# Patient Record
Sex: FEMALE | Race: WHITE | Employment: OTHER | URBAN - METROPOLITAN AREA
[De-identification: names, ages, dates, MRNs, and addresses within clinical notes are randomized per-mention and may not be internally consistent; named-entity substitution may affect disease eponyms.]

---

## 2017-01-06 ENCOUNTER — OFFICE VISIT (OUTPATIENT)
Dept: INTERNAL MEDICINE CLINIC | Age: 62
End: 2017-01-06

## 2017-01-06 VITALS
WEIGHT: 115 LBS | DIASTOLIC BLOOD PRESSURE: 60 MMHG | HEART RATE: 100 BPM | HEIGHT: 64 IN | SYSTOLIC BLOOD PRESSURE: 92 MMHG | BODY MASS INDEX: 19.63 KG/M2

## 2017-01-06 DIAGNOSIS — J45.40 MODERATE PERSISTENT ASTHMA WITHOUT COMPLICATION: ICD-10-CM

## 2017-01-06 DIAGNOSIS — T45.1X5A ANTINEOPLASTIC CHEMOTHERAPY INDUCED PANCYTOPENIA (HCC): ICD-10-CM

## 2017-01-06 DIAGNOSIS — T45.1X5A CHEMOTHERAPY-INDUCED NEUROPATHY (HCC): ICD-10-CM

## 2017-01-06 DIAGNOSIS — D61.810 ANTINEOPLASTIC CHEMOTHERAPY INDUCED PANCYTOPENIA (HCC): ICD-10-CM

## 2017-01-06 DIAGNOSIS — G62.0 CHEMOTHERAPY-INDUCED NEUROPATHY (HCC): ICD-10-CM

## 2017-01-06 DIAGNOSIS — Z23 NEED FOR PNEUMOCOCCAL VACCINATION: ICD-10-CM

## 2017-01-06 DIAGNOSIS — Z93.3 COLOSTOMY STATUS (HCC): ICD-10-CM

## 2017-01-06 DIAGNOSIS — K62.89 PROCTODYNIA: ICD-10-CM

## 2017-01-06 DIAGNOSIS — C18.7 MALIGNANT NEOPLASM OF SIGMOID COLON (HCC): Primary | ICD-10-CM

## 2017-01-06 DIAGNOSIS — E46 PROTEIN CALORIE MALNUTRITION (HCC): ICD-10-CM

## 2017-01-06 DIAGNOSIS — F43.21 ADJUSTMENT REACTION WITH PROLONGED DEPRESSIVE REACTION: ICD-10-CM

## 2017-01-06 DIAGNOSIS — Z93.6 NEPHROSTOMY STATUS (HCC): ICD-10-CM

## 2017-01-06 PROCEDURE — G0009 ADMIN PNEUMOCOCCAL VACCINE: HCPCS | Performed by: FAMILY MEDICINE

## 2017-01-06 PROCEDURE — 90670 PCV13 VACCINE IM: CPT | Performed by: FAMILY MEDICINE

## 2017-01-06 PROCEDURE — 99214 OFFICE O/P EST MOD 30 MIN: CPT | Performed by: FAMILY MEDICINE

## 2017-01-06 RX ORDER — EMOLLIENT BASE
CREAM (GRAM) TOPICAL
Qty: 240 G | Refills: 3 | Status: SHIPPED | OUTPATIENT
Start: 2017-01-06 | End: 2017-03-15

## 2017-01-06 RX ORDER — NORTRIPTYLINE HYDROCHLORIDE 75 MG/1
75 CAPSULE ORAL NIGHTLY
Qty: 30 CAPSULE | Refills: 3 | Status: SHIPPED | OUTPATIENT
Start: 2017-01-06 | End: 2017-05-03 | Stop reason: SDUPTHER

## 2017-01-08 ASSESSMENT — ENCOUNTER SYMPTOMS
CHEST TIGHTNESS: 0
CONSTIPATION: 0
NAUSEA: 1
WHEEZING: 0
DIARRHEA: 0
VOMITING: 0
SHORTNESS OF BREATH: 0
COUGH: 0

## 2017-01-10 PROBLEM — D64.9 ANEMIA: Status: ACTIVE | Noted: 2017-01-10

## 2017-01-11 PROBLEM — D50.0 IRON DEFICIENCY ANEMIA DUE TO CHRONIC BLOOD LOSS: Status: ACTIVE | Noted: 2017-01-11

## 2017-01-11 PROBLEM — Z87.19 PERSONAL HISTORY OF DIGESTIVE DISEASE: Status: ACTIVE | Noted: 2017-01-11

## 2017-01-31 PROBLEM — T45.1X5A CHEMOTHERAPY-INDUCED THROMBOCYTOPENIA: Status: ACTIVE | Noted: 2017-01-31

## 2017-01-31 PROBLEM — D69.59 CHEMOTHERAPY-INDUCED THROMBOCYTOPENIA: Status: ACTIVE | Noted: 2017-01-31

## 2017-02-01 ENCOUNTER — HOSPITAL ENCOUNTER (OUTPATIENT)
Dept: INTERVENTIONAL RADIOLOGY/VASCULAR | Age: 62
Discharge: HOME | End: 2017-02-01
Attending: INTERNAL MEDICINE | Admitting: INTERNAL MEDICINE

## 2017-02-01 VITALS
RESPIRATION RATE: 14 BRPM | BODY MASS INDEX: 19.54 KG/M2 | HEART RATE: 72 BPM | HEIGHT: 65 IN | TEMPERATURE: 98 F | OXYGEN SATURATION: 100 % | DIASTOLIC BLOOD PRESSURE: 62 MMHG | SYSTOLIC BLOOD PRESSURE: 104 MMHG | WEIGHT: 117.28 LBS

## 2017-02-01 DIAGNOSIS — C78.7 METASTASIS TO LIVER (HCC): Chronic | ICD-10-CM

## 2017-02-01 DIAGNOSIS — C18.7 MALIGNANT NEOPLASM OF SIGMOID COLON (HCC): ICD-10-CM

## 2017-02-01 LAB
INR BLD: 1.41 (ref 0.85–1.16)
PROTHROMBIN TIME: 16.2 SEC (ref 9.8–13)

## 2017-02-01 RX ORDER — IODIXANOL 320 MG/ML
100 INJECTION, SOLUTION INTRAVASCULAR
Status: COMPLETED | OUTPATIENT
Start: 2017-02-01 | End: 2017-02-01

## 2017-02-01 RX ORDER — MIDAZOLAM HYDROCHLORIDE 1 MG/ML
INJECTION INTRAMUSCULAR; INTRAVENOUS DAILY PRN
Status: COMPLETED | OUTPATIENT
Start: 2017-02-01 | End: 2017-02-01

## 2017-02-01 RX ORDER — SODIUM CHLORIDE 9 MG/ML
INJECTION, SOLUTION INTRAVENOUS CONTINUOUS
Status: DISCONTINUED | OUTPATIENT
Start: 2017-02-01 | End: 2017-02-02 | Stop reason: HOSPADM

## 2017-02-01 RX ORDER — CIPROFLOXACIN 2 MG/ML
400 INJECTION, SOLUTION INTRAVENOUS
Status: DISCONTINUED | OUTPATIENT
Start: 2017-02-01 | End: 2017-02-01 | Stop reason: SDUPTHER

## 2017-02-01 RX ORDER — FENTANYL CITRATE 50 UG/ML
INJECTION, SOLUTION INTRAMUSCULAR; INTRAVENOUS DAILY PRN
Status: COMPLETED | OUTPATIENT
Start: 2017-02-01 | End: 2017-02-01

## 2017-02-01 RX ORDER — CLINDAMYCIN PHOSPHATE 900 MG/50ML
900 INJECTION INTRAVENOUS
Status: DISCONTINUED | OUTPATIENT
Start: 2017-02-01 | End: 2017-02-01 | Stop reason: SDUPTHER

## 2017-02-01 RX ORDER — CIPROFLOXACIN 2 MG/ML
400 INJECTION, SOLUTION INTRAVENOUS
Status: COMPLETED | OUTPATIENT
Start: 2017-02-01 | End: 2017-02-01

## 2017-02-01 RX ORDER — CLINDAMYCIN PHOSPHATE 900 MG/50ML
900 INJECTION INTRAVENOUS
Status: COMPLETED | OUTPATIENT
Start: 2017-02-01 | End: 2017-02-01

## 2017-02-01 RX ADMIN — FENTANYL CITRATE 50 MCG: 50 INJECTION, SOLUTION INTRAMUSCULAR; INTRAVENOUS at 08:32

## 2017-02-01 RX ADMIN — MIDAZOLAM HYDROCHLORIDE 1 MG: 1 INJECTION INTRAMUSCULAR; INTRAVENOUS at 08:26

## 2017-02-01 RX ADMIN — CIPROFLOXACIN 400 MG: 2 INJECTION, SOLUTION INTRAVENOUS at 08:21

## 2017-02-01 RX ADMIN — CLINDAMYCIN PHOSPHATE 900 MG: 900 INJECTION INTRAVENOUS at 08:07

## 2017-02-01 RX ADMIN — MIDAZOLAM HYDROCHLORIDE 1 MG: 1 INJECTION INTRAMUSCULAR; INTRAVENOUS at 08:28

## 2017-02-01 RX ADMIN — FENTANYL CITRATE 50 MCG: 50 INJECTION, SOLUTION INTRAMUSCULAR; INTRAVENOUS at 08:26

## 2017-02-01 RX ADMIN — IODIXANOL 20 ML: 320 INJECTION, SOLUTION INTRAVASCULAR at 08:56

## 2017-02-01 RX ADMIN — SODIUM CHLORIDE: 9 INJECTION, SOLUTION INTRAVENOUS at 07:45

## 2017-02-01 ASSESSMENT — PAIN SCALES - GENERAL
PAINLEVEL_OUTOF10: 0
PAINLEVEL_OUTOF10: 2
PAINLEVEL_OUTOF10: 0

## 2017-02-01 ASSESSMENT — PAIN DESCRIPTION - DESCRIPTORS: DESCRIPTORS: ACHING;BURNING

## 2017-02-01 ASSESSMENT — PAIN - FUNCTIONAL ASSESSMENT: PAIN_FUNCTIONAL_ASSESSMENT: 0-10

## 2017-02-17 ENCOUNTER — TELEPHONE (OUTPATIENT)
Dept: INTERNAL MEDICINE CLINIC | Age: 62
End: 2017-02-17

## 2017-02-28 PROBLEM — I73.9 RIGHT LEG CLAUDICATION (HCC): Status: ACTIVE | Noted: 2017-02-28

## 2017-03-03 ENCOUNTER — HOSPITAL ENCOUNTER (OUTPATIENT)
Dept: VASCULAR LAB | Age: 62
Discharge: HOME | End: 2017-03-03
Attending: INTERNAL MEDICINE | Admitting: INTERNAL MEDICINE

## 2017-03-03 DIAGNOSIS — C18.7 MALIGNANT NEOPLASM OF SIGMOID COLON (HCC): ICD-10-CM

## 2017-03-06 PROBLEM — I77.1 RIGHT ILIAC ARTERY STENOSIS (HCC): Status: ACTIVE | Noted: 2017-03-06

## 2017-03-07 ENCOUNTER — INITIAL CONSULT (OUTPATIENT)
Dept: SURGERY | Age: 62
End: 2017-03-07

## 2017-03-07 VITALS
HEART RATE: 117 BPM | WEIGHT: 121 LBS | BODY MASS INDEX: 20.16 KG/M2 | SYSTOLIC BLOOD PRESSURE: 114 MMHG | DIASTOLIC BLOOD PRESSURE: 66 MMHG | HEIGHT: 65 IN

## 2017-03-07 DIAGNOSIS — I70.261 ATHEROSCLEROSIS OF NATIVE ARTERY OF RIGHT LOWER EXTREMITY WITH GANGRENE (HCC): Primary | ICD-10-CM

## 2017-03-07 DIAGNOSIS — I10 ESSENTIAL HYPERTENSION: ICD-10-CM

## 2017-03-07 DIAGNOSIS — I73.9 PVD (PERIPHERAL VASCULAR DISEASE) (HCC): ICD-10-CM

## 2017-03-07 DIAGNOSIS — I70.234 ATHEROSCLEROSIS OF NATIVE ARTERY OF RIGHT LOWER EXTREMITY WITH ULCERATION OF MIDFOOT (HCC): ICD-10-CM

## 2017-03-07 DIAGNOSIS — I77.1 RIGHT ILIAC ARTERY STENOSIS (HCC): ICD-10-CM

## 2017-03-07 PROCEDURE — 99205 OFFICE O/P NEW HI 60 MIN: CPT | Performed by: SURGERY

## 2017-03-07 PROCEDURE — 3014F SCREEN MAMMO DOC REV: CPT | Performed by: SURGERY

## 2017-03-07 PROCEDURE — G8420 CALC BMI NORM PARAMETERS: HCPCS | Performed by: SURGERY

## 2017-03-07 PROCEDURE — G8484 FLU IMMUNIZE NO ADMIN: HCPCS | Performed by: SURGERY

## 2017-03-07 PROCEDURE — 4004F PT TOBACCO SCREEN RCVD TLK: CPT | Performed by: SURGERY

## 2017-03-07 PROCEDURE — G8599 NO ASA/ANTIPLAT THER USE RNG: HCPCS | Performed by: SURGERY

## 2017-03-07 PROCEDURE — G8427 DOCREV CUR MEDS BY ELIG CLIN: HCPCS | Performed by: SURGERY

## 2017-03-07 PROCEDURE — 3017F COLORECTAL CA SCREEN DOC REV: CPT | Performed by: SURGERY

## 2017-03-07 ASSESSMENT — ENCOUNTER SYMPTOMS
RESPIRATORY NEGATIVE: 1
COLOR CHANGE: 1
GASTROINTESTINAL NEGATIVE: 1

## 2017-03-15 ENCOUNTER — HOSPITAL ENCOUNTER (OUTPATIENT)
Dept: CARDIAC CATH/INVASIVE PROCEDURES | Age: 62
Discharge: HOME | End: 2017-03-15
Attending: INTERNAL MEDICINE | Admitting: INTERNAL MEDICINE

## 2017-03-15 VITALS — HEIGHT: 65 IN | WEIGHT: 121 LBS | BODY MASS INDEX: 20.16 KG/M2

## 2017-03-15 DIAGNOSIS — I70.234 ATHEROSCLEROSIS OF NATIVE ARTERY OF RIGHT LOWER EXTREMITY WITH ULCERATION OF MIDFOOT (HCC): Primary | ICD-10-CM

## 2017-03-15 DIAGNOSIS — C18.7 MALIGNANT NEOPLASM OF SIGMOID COLON (HCC): ICD-10-CM

## 2017-03-15 LAB
CALCIUM IONIZED: 1.08 MMOL/L (ref 1.12–1.32)
GFR AFRICAN AMERICAN: 56
GFR NON-AFRICAN AMERICAN: 46
GLUCOSE BLD-MCNC: 108 MG/DL (ref 70–99)
HCT VFR BLD CALC: 25.3 % (ref 36–48)
HEMOGLOBIN: 8.3 G/DL (ref 12–16)
HEMOGLOBIN: 8.3 GM/DL (ref 12–16)
MCH RBC QN AUTO: 34.4 PG (ref 26–34)
MCHC RBC AUTO-ENTMCNC: 32.8 G/DL (ref 31–36)
MCV RBC AUTO: 104.8 FL (ref 80–100)
PDW BLD-RTO: 19.6 % (ref 12.4–15.4)
PERFORMED ON: ABNORMAL
PLATELET # BLD: 133 K/UL (ref 135–450)
PMV BLD AUTO: 7.1 FL (ref 5–10.5)
POC CHLORIDE: 107 MMOL/L (ref 99–110)
POC CREATININE: 1.2 MG/DL (ref 0.6–1.1)
POC HEMATOCRIT: 24 % (ref 36–48)
POC POTASSIUM: 4.1 MMOL/L (ref 3.5–5.1)
POC SAMPLE TYPE: ABNORMAL
POC SODIUM: 138 MMOL/L (ref 136–145)
RBC # BLD: 2.42 M/UL (ref 4–5.2)
WBC # BLD: 3.7 K/UL (ref 4–11)

## 2017-03-15 PROCEDURE — 75716 ARTERY X-RAYS ARMS/LEGS: CPT | Performed by: SURGERY

## 2017-03-15 PROCEDURE — 37221 PR REVSC OPN/PRQ ILIAC ART W/STNT PLMT & ANGIOPLSTY: CPT | Performed by: SURGERY

## 2017-03-15 PROCEDURE — 76937 US GUIDE VASCULAR ACCESS: CPT | Performed by: SURGERY

## 2017-03-15 PROCEDURE — 37223 PR REVSC OPN/PRQ ILIAC ART W/STNT & ANGIOP IPSILATL: CPT | Performed by: SURGERY

## 2017-03-15 PROCEDURE — 75625 CONTRAST EXAM ABDOMINL AORTA: CPT | Performed by: SURGERY

## 2017-03-15 RX ORDER — ACETAMINOPHEN 325 MG/1
650 TABLET ORAL EVERY 4 HOURS PRN
Status: DISCONTINUED | OUTPATIENT
Start: 2017-03-15 | End: 2017-03-16 | Stop reason: HOSPADM

## 2017-03-15 RX ORDER — ASPIRIN 81 MG/1
81 TABLET, CHEWABLE ORAL DAILY
Status: DISCONTINUED | OUTPATIENT
Start: 2017-03-16 | End: 2017-03-16 | Stop reason: HOSPADM

## 2017-03-15 RX ORDER — SODIUM CHLORIDE 0.9 % (FLUSH) 0.9 %
10 SYRINGE (ML) INJECTION PRN
Status: DISCONTINUED | OUTPATIENT
Start: 2017-03-15 | End: 2017-03-16 | Stop reason: HOSPADM

## 2017-03-15 RX ORDER — ONDANSETRON 2 MG/ML
4 INJECTION INTRAMUSCULAR; INTRAVENOUS EVERY 6 HOURS PRN
Status: DISCONTINUED | OUTPATIENT
Start: 2017-03-15 | End: 2017-03-16 | Stop reason: HOSPADM

## 2017-03-15 RX ORDER — SODIUM CHLORIDE 9 MG/ML
INJECTION, SOLUTION INTRAVENOUS CONTINUOUS
Status: DISCONTINUED | OUTPATIENT
Start: 2017-03-15 | End: 2017-03-16 | Stop reason: HOSPADM

## 2017-03-15 RX ORDER — FENTANYL CITRATE 50 UG/ML
25 INJECTION, SOLUTION INTRAMUSCULAR; INTRAVENOUS
Status: ACTIVE | OUTPATIENT
Start: 2017-03-15 | End: 2017-03-15

## 2017-03-15 RX ORDER — 0.9 % SODIUM CHLORIDE 0.9 %
500 INTRAVENOUS SOLUTION INTRAVENOUS PRN
Status: DISCONTINUED | OUTPATIENT
Start: 2017-03-15 | End: 2017-03-16 | Stop reason: HOSPADM

## 2017-03-15 RX ORDER — MORPHINE SULFATE 10 MG/ML
2 INJECTION, SOLUTION INTRAMUSCULAR; INTRAVENOUS
Status: ACTIVE | OUTPATIENT
Start: 2017-03-15 | End: 2017-03-15

## 2017-03-15 RX ORDER — SODIUM CHLORIDE 0.9 % (FLUSH) 0.9 %
10 SYRINGE (ML) INJECTION EVERY 12 HOURS SCHEDULED
Status: DISCONTINUED | OUTPATIENT
Start: 2017-03-15 | End: 2017-03-16 | Stop reason: HOSPADM

## 2017-03-17 ENCOUNTER — HOSPITAL ENCOUNTER (OUTPATIENT)
Dept: CT IMAGING | Age: 62
Discharge: HOME | End: 2017-03-17
Attending: INTERNAL MEDICINE | Admitting: INTERNAL MEDICINE

## 2017-03-17 DIAGNOSIS — C78.00 METASTATIC ADENOCARCINOMA TO LUNG, UNSPECIFIED LATERALITY (HCC): ICD-10-CM

## 2017-03-17 DIAGNOSIS — C78.7 METASTASIS TO LIVER (HCC): ICD-10-CM

## 2017-03-17 DIAGNOSIS — C18.7 MALIGNANT NEOPLASM OF SIGMOID COLON (HCC): ICD-10-CM

## 2017-03-22 ENCOUNTER — HOSPITAL ENCOUNTER (OUTPATIENT)
Dept: WOUND CARE | Age: 62
Discharge: HOME | End: 2017-03-22
Attending: PODIATRIST | Admitting: PODIATRIST

## 2017-03-22 VITALS
SYSTOLIC BLOOD PRESSURE: 132 MMHG | BODY MASS INDEX: 19.39 KG/M2 | DIASTOLIC BLOOD PRESSURE: 79 MMHG | WEIGHT: 116.4 LBS | HEART RATE: 106 BPM | RESPIRATION RATE: 20 BRPM | TEMPERATURE: 98.3 F

## 2017-03-22 ASSESSMENT — PAIN SCALES - GENERAL: PAINLEVEL_OUTOF10: 0

## 2017-03-24 ENCOUNTER — HOSPITAL ENCOUNTER (OUTPATIENT)
Dept: INTERVENTIONAL RADIOLOGY/VASCULAR | Age: 62
Discharge: HOME | End: 2017-03-24
Attending: INTERNAL MEDICINE | Admitting: INTERNAL MEDICINE

## 2017-03-24 VITALS
DIASTOLIC BLOOD PRESSURE: 63 MMHG | TEMPERATURE: 97.7 F | WEIGHT: 113.32 LBS | SYSTOLIC BLOOD PRESSURE: 122 MMHG | RESPIRATION RATE: 14 BRPM | HEART RATE: 93 BPM | BODY MASS INDEX: 19.35 KG/M2 | OXYGEN SATURATION: 98 % | HEIGHT: 64 IN

## 2017-03-24 DIAGNOSIS — C18.7 MALIGNANT NEOPLASM OF SIGMOID COLON (HCC): ICD-10-CM

## 2017-03-24 DIAGNOSIS — Z93.3 COLOSTOMY STATUS (HCC): ICD-10-CM

## 2017-03-24 LAB
INR BLD: 1.24 (ref 0.85–1.15)
PROTHROMBIN TIME: 14 SEC (ref 9.6–13)

## 2017-03-24 RX ORDER — CLINDAMYCIN PHOSPHATE 600 MG/50ML
600 INJECTION INTRAVENOUS
Status: DISCONTINUED | OUTPATIENT
Start: 2017-03-24 | End: 2017-03-24 | Stop reason: DRUGHIGH

## 2017-03-24 RX ORDER — IODIXANOL 320 MG/ML
100 INJECTION, SOLUTION INTRAVASCULAR
Status: COMPLETED | OUTPATIENT
Start: 2017-03-24 | End: 2017-03-24

## 2017-03-24 RX ORDER — SODIUM CHLORIDE 9 MG/ML
INJECTION, SOLUTION INTRAVENOUS CONTINUOUS
Status: DISCONTINUED | OUTPATIENT
Start: 2017-03-24 | End: 2017-03-25 | Stop reason: HOSPADM

## 2017-03-24 RX ORDER — CLINDAMYCIN PHOSPHATE 900 MG/50ML
900 INJECTION INTRAVENOUS
Status: COMPLETED | OUTPATIENT
Start: 2017-03-24 | End: 2017-03-24

## 2017-03-24 RX ORDER — CIPROFLOXACIN 2 MG/ML
400 INJECTION, SOLUTION INTRAVENOUS
Status: COMPLETED | OUTPATIENT
Start: 2017-03-24 | End: 2017-03-24

## 2017-03-24 RX ORDER — MIDAZOLAM HYDROCHLORIDE 1 MG/ML
INJECTION INTRAMUSCULAR; INTRAVENOUS DAILY PRN
Status: COMPLETED | OUTPATIENT
Start: 2017-03-24 | End: 2017-03-24

## 2017-03-24 RX ADMIN — CIPROFLOXACIN 400 MG: 2 INJECTION, SOLUTION INTRAVENOUS at 12:42

## 2017-03-24 RX ADMIN — MIDAZOLAM HYDROCHLORIDE 2 MG: 1 INJECTION INTRAMUSCULAR; INTRAVENOUS at 12:46

## 2017-03-24 RX ADMIN — IODIXANOL 10 ML: 320 INJECTION, SOLUTION INTRAVASCULAR at 13:17

## 2017-03-24 RX ADMIN — SODIUM CHLORIDE: 9 INJECTION, SOLUTION INTRAVENOUS at 12:21

## 2017-03-24 RX ADMIN — CLINDAMYCIN PHOSPHATE 900 MG: 900 INJECTION INTRAVENOUS at 12:21

## 2017-03-24 ASSESSMENT — PAIN - FUNCTIONAL ASSESSMENT: PAIN_FUNCTIONAL_ASSESSMENT: 0-10

## 2017-03-29 ENCOUNTER — PROCEDURE VISIT (OUTPATIENT)
Dept: SURGERY | Age: 62
End: 2017-03-29

## 2017-03-29 ENCOUNTER — TELEPHONE (OUTPATIENT)
Dept: SURGERY | Age: 62
End: 2017-03-29

## 2017-03-29 DIAGNOSIS — I70.234 ATHEROSCLEROSIS OF NATIVE ARTERY OF RIGHT LOWER EXTREMITY WITH ULCERATION OF MIDFOOT (HCC): ICD-10-CM

## 2017-03-29 DIAGNOSIS — I77.1 RIGHT ILIAC ARTERY STENOSIS (HCC): Primary | ICD-10-CM

## 2017-03-29 DIAGNOSIS — I73.9 RIGHT LEG CLAUDICATION (HCC): ICD-10-CM

## 2017-03-29 PROCEDURE — 93926 LOWER EXTREMITY STUDY: CPT | Performed by: SURGERY

## 2017-03-30 PROBLEM — I74.3 ARTERIAL EMBOLISM AND THROMBOSIS OF LOWER EXTREMITY (HCC): Status: ACTIVE | Noted: 2017-03-30

## 2017-03-31 ENCOUNTER — HOSPITAL ENCOUNTER (OUTPATIENT)
Dept: CARDIAC CATH/INVASIVE PROCEDURES | Age: 62
Discharge: HOME | End: 2017-03-31
Attending: SURGERY | Admitting: SURGERY

## 2017-03-31 VITALS — HEIGHT: 64 IN | WEIGHT: 113 LBS | BODY MASS INDEX: 19.29 KG/M2

## 2017-03-31 DIAGNOSIS — I74.3 ARTERIAL EMBOLISM AND THROMBOSIS OF LOWER EXTREMITY (HCC): ICD-10-CM

## 2017-03-31 DIAGNOSIS — I70.234 ATHEROSCLEROSIS OF NATIVE ARTERY OF RIGHT LOWER EXTREMITY WITH ULCERATION OF MIDFOOT (HCC): Primary | ICD-10-CM

## 2017-03-31 DIAGNOSIS — C18.7 MALIGNANT NEOPLASM OF SIGMOID COLON (HCC): ICD-10-CM

## 2017-03-31 LAB
ANION GAP SERPL CALCULATED.3IONS-SCNC: 10 MMOL/L (ref 3–16)
BUN BLDV-MCNC: 26 MG/DL (ref 7–20)
CALCIUM SERPL-MCNC: 8.7 MG/DL (ref 8.3–10.6)
CHLORIDE BLD-SCNC: 103 MMOL/L (ref 99–110)
CO2: 20 MMOL/L (ref 21–32)
CREAT SERPL-MCNC: 1.2 MG/DL (ref 0.6–1.1)
GFR AFRICAN AMERICAN: 56
GFR NON-AFRICAN AMERICAN: 46
GLUCOSE BLD-MCNC: 108 MG/DL (ref 70–99)
HCT VFR BLD CALC: 22.2 % (ref 36–48)
HEMOGLOBIN: 7.2 G/DL (ref 12–16)
MCH RBC QN AUTO: 33.4 PG (ref 26–34)
MCHC RBC AUTO-ENTMCNC: 32.5 G/DL (ref 31–36)
MCV RBC AUTO: 102.9 FL (ref 80–100)
PDW BLD-RTO: 18.9 % (ref 12.4–15.4)
PLATELET # BLD: 148 K/UL (ref 135–450)
PMV BLD AUTO: 6.8 FL (ref 5–10.5)
POTASSIUM SERPL-SCNC: 4.3 MMOL/L (ref 3.5–5.1)
RBC # BLD: 2.16 M/UL (ref 4–5.2)
SODIUM BLD-SCNC: 133 MMOL/L (ref 136–145)
WBC # BLD: 3.2 K/UL (ref 4–11)

## 2017-03-31 PROCEDURE — 37226 PR REVSC OPN/PRQ FEM/POP W/STNT/ANGIOP SM VSL: CPT | Performed by: SURGERY

## 2017-03-31 PROCEDURE — 76937 US GUIDE VASCULAR ACCESS: CPT | Performed by: SURGERY

## 2017-03-31 PROCEDURE — 37221 PR REVSC OPN/PRQ ILIAC ART W/STNT PLMT & ANGIOPLSTY: CPT | Performed by: SURGERY

## 2017-03-31 PROCEDURE — 37185 PRIM ART M-THRMBC SBSQ VSL: CPT | Performed by: SURGERY

## 2017-03-31 PROCEDURE — 75625 CONTRAST EXAM ABDOMINL AORTA: CPT | Performed by: SURGERY

## 2017-03-31 PROCEDURE — 37184 PRIM ART M-THRMBC 1ST VSL: CPT | Performed by: SURGERY

## 2017-03-31 PROCEDURE — 75710 ARTERY X-RAYS ARM/LEG: CPT | Performed by: SURGERY

## 2017-03-31 RX ORDER — ASPIRIN 81 MG/1
81 TABLET, CHEWABLE ORAL DAILY
Status: DISCONTINUED | OUTPATIENT
Start: 2017-03-31 | End: 2017-04-01 | Stop reason: HOSPADM

## 2017-03-31 RX ORDER — ASPIRIN 81 MG/1
81 TABLET ORAL DAILY
Qty: 30 TABLET | Refills: 3 | Status: SHIPPED | OUTPATIENT
Start: 2017-03-31

## 2017-03-31 RX ORDER — SODIUM CHLORIDE 9 MG/ML
INJECTION, SOLUTION INTRAVENOUS CONTINUOUS
Status: DISCONTINUED | OUTPATIENT
Start: 2017-03-31 | End: 2017-04-01 | Stop reason: HOSPADM

## 2017-03-31 RX ORDER — SODIUM CHLORIDE 0.9 % (FLUSH) 0.9 %
10 SYRINGE (ML) INJECTION EVERY 12 HOURS SCHEDULED
Status: DISCONTINUED | OUTPATIENT
Start: 2017-03-31 | End: 2017-03-31 | Stop reason: ALTCHOICE

## 2017-03-31 RX ORDER — SODIUM CHLORIDE 9 MG/ML
INJECTION, SOLUTION INTRAVENOUS CONTINUOUS
Status: DISCONTINUED | OUTPATIENT
Start: 2017-03-31 | End: 2017-03-31 | Stop reason: ALTCHOICE

## 2017-03-31 RX ORDER — ACETAMINOPHEN 325 MG/1
650 TABLET ORAL EVERY 4 HOURS PRN
Status: DISCONTINUED | OUTPATIENT
Start: 2017-03-31 | End: 2017-04-01 | Stop reason: HOSPADM

## 2017-03-31 RX ORDER — SODIUM CHLORIDE 0.9 % (FLUSH) 0.9 %
10 SYRINGE (ML) INJECTION PRN
Status: DISCONTINUED | OUTPATIENT
Start: 2017-03-31 | End: 2017-03-31 | Stop reason: ALTCHOICE

## 2017-03-31 RX ORDER — MORPHINE SULFATE 10 MG/ML
2 INJECTION, SOLUTION INTRAMUSCULAR; INTRAVENOUS
Status: ACTIVE | OUTPATIENT
Start: 2017-03-31 | End: 2017-03-31

## 2017-03-31 RX ORDER — FENTANYL CITRATE 50 UG/ML
25 INJECTION, SOLUTION INTRAMUSCULAR; INTRAVENOUS
Status: ACTIVE | OUTPATIENT
Start: 2017-03-31 | End: 2017-03-31

## 2017-03-31 RX ORDER — ONDANSETRON 2 MG/ML
4 INJECTION INTRAMUSCULAR; INTRAVENOUS EVERY 6 HOURS PRN
Status: DISCONTINUED | OUTPATIENT
Start: 2017-03-31 | End: 2017-04-01 | Stop reason: HOSPADM

## 2017-03-31 RX ORDER — 0.9 % SODIUM CHLORIDE 0.9 %
500 INTRAVENOUS SOLUTION INTRAVENOUS PRN
Status: DISCONTINUED | OUTPATIENT
Start: 2017-03-31 | End: 2017-04-01 | Stop reason: HOSPADM

## 2017-04-03 DIAGNOSIS — F32.9 REACTIVE DEPRESSION (SITUATIONAL): ICD-10-CM

## 2017-04-03 DIAGNOSIS — R63.4 ABNORMAL WEIGHT LOSS: ICD-10-CM

## 2017-04-03 RX ORDER — MIRTAZAPINE 15 MG/1
TABLET, FILM COATED ORAL
Qty: 30 TABLET | Refills: 5 | Status: SHIPPED | OUTPATIENT
Start: 2017-04-03 | End: 2017-09-18 | Stop reason: SDUPTHER

## 2017-04-04 DIAGNOSIS — I10 ESSENTIAL HYPERTENSION: ICD-10-CM

## 2017-04-05 RX ORDER — LISINOPRIL 5 MG/1
TABLET ORAL
Qty: 30 TABLET | Refills: 5 | Status: SHIPPED | OUTPATIENT
Start: 2017-04-05 | End: 2017-09-18 | Stop reason: SDUPTHER

## 2017-04-06 ENCOUNTER — SURG/PROC ORDERS (OUTPATIENT)
Dept: ANESTHESIOLOGY | Age: 62
End: 2017-04-06

## 2017-04-06 ENCOUNTER — OFFICE VISIT (OUTPATIENT)
Dept: INTERNAL MEDICINE CLINIC | Age: 62
End: 2017-04-06

## 2017-04-06 ENCOUNTER — PAT TELEPHONE (OUTPATIENT)
Dept: PREADMISSION TESTING | Age: 62
End: 2017-04-06

## 2017-04-06 VITALS — WEIGHT: 113 LBS | HEIGHT: 65 IN | BODY MASS INDEX: 18.83 KG/M2

## 2017-04-06 VITALS
SYSTOLIC BLOOD PRESSURE: 110 MMHG | HEART RATE: 88 BPM | BODY MASS INDEX: 20.3 KG/M2 | WEIGHT: 122 LBS | DIASTOLIC BLOOD PRESSURE: 64 MMHG

## 2017-04-06 DIAGNOSIS — I96 DRY GANGRENE (HCC): ICD-10-CM

## 2017-04-06 DIAGNOSIS — F17.200 TOBACCO DEPENDENCE: ICD-10-CM

## 2017-04-06 DIAGNOSIS — Z01.818 PRE-OP EXAMINATION: Primary | ICD-10-CM

## 2017-04-06 DIAGNOSIS — R30.0 DYSURIA: ICD-10-CM

## 2017-04-06 DIAGNOSIS — I70.234 ATHEROSCLEROSIS OF NATIVE ARTERY OF RIGHT LOWER EXTREMITY WITH ULCERATION OF MIDFOOT (HCC): ICD-10-CM

## 2017-04-06 DIAGNOSIS — C18.7 MALIGNANT NEOPLASM OF SIGMOID COLON (HCC): ICD-10-CM

## 2017-04-06 PROBLEM — Z87.19 PERSONAL HISTORY OF DIGESTIVE DISEASE: Status: RESOLVED | Noted: 2017-01-11 | Resolved: 2017-04-06

## 2017-04-06 LAB
BILIRUBIN, POC: ABNORMAL
BLOOD URINE, POC: ABNORMAL
CLARITY, POC: ABNORMAL
COLOR, POC: ABNORMAL
GLUCOSE URINE, POC: ABNORMAL
KETONES, POC: ABNORMAL
LEUKOCYTE EST, POC: ABNORMAL
NITRITE, POC: NEGATIVE
PH, POC: 6
PROTEIN, POC: ABNORMAL
SPECIFIC GRAVITY, POC: 1.03
UROBILINOGEN, POC: 0.2

## 2017-04-06 PROCEDURE — 99214 OFFICE O/P EST MOD 30 MIN: CPT | Performed by: FAMILY MEDICINE

## 2017-04-06 PROCEDURE — G8598 ASA/ANTIPLAT THER USED: HCPCS | Performed by: FAMILY MEDICINE

## 2017-04-06 PROCEDURE — 3014F SCREEN MAMMO DOC REV: CPT | Performed by: FAMILY MEDICINE

## 2017-04-06 PROCEDURE — G8420 CALC BMI NORM PARAMETERS: HCPCS | Performed by: FAMILY MEDICINE

## 2017-04-06 PROCEDURE — 93000 ELECTROCARDIOGRAM COMPLETE: CPT | Performed by: FAMILY MEDICINE

## 2017-04-06 PROCEDURE — G8427 DOCREV CUR MEDS BY ELIG CLIN: HCPCS | Performed by: FAMILY MEDICINE

## 2017-04-06 PROCEDURE — 3017F COLORECTAL CA SCREEN DOC REV: CPT | Performed by: FAMILY MEDICINE

## 2017-04-06 PROCEDURE — 81002 URINALYSIS NONAUTO W/O SCOPE: CPT | Performed by: FAMILY MEDICINE

## 2017-04-06 PROCEDURE — 4004F PT TOBACCO SCREEN RCVD TLK: CPT | Performed by: FAMILY MEDICINE

## 2017-04-06 RX ORDER — SODIUM CHLORIDE 9 MG/ML
INJECTION, SOLUTION INTRAVENOUS CONTINUOUS
Status: CANCELLED | OUTPATIENT
Start: 2017-04-06

## 2017-04-06 RX ORDER — SODIUM CHLORIDE 0.9 % (FLUSH) 0.9 %
10 SYRINGE (ML) INJECTION EVERY 12 HOURS SCHEDULED
Status: CANCELLED | OUTPATIENT
Start: 2017-04-06

## 2017-04-06 RX ORDER — SODIUM CHLORIDE 0.9 % (FLUSH) 0.9 %
10 SYRINGE (ML) INJECTION PRN
Status: CANCELLED | OUTPATIENT
Start: 2017-04-06

## 2017-04-06 RX ORDER — CLOTRIMAZOLE 10 MG/1
10 LOZENGE ORAL; TOPICAL
Qty: 35 TABLET | Refills: 0 | Status: SHIPPED | OUTPATIENT
Start: 2017-04-08 | End: 2017-04-15

## 2017-04-06 RX ORDER — NORTRIPTYLINE HYDROCHLORIDE 25 MG/1
25 CAPSULE ORAL NIGHTLY
COMMUNITY
End: 2017-05-03 | Stop reason: SDUPTHER

## 2017-04-07 ENCOUNTER — HOSPITAL ENCOUNTER (OUTPATIENT)
Dept: SURGERY | Age: 62
Discharge: HOME | End: 2017-04-07
Attending: PODIATRIST | Admitting: PODIATRIST

## 2017-04-07 VITALS
BODY MASS INDEX: 20.17 KG/M2 | OXYGEN SATURATION: 100 % | HEART RATE: 81 BPM | HEIGHT: 65 IN | WEIGHT: 121.03 LBS | TEMPERATURE: 97.6 F | SYSTOLIC BLOOD PRESSURE: 110 MMHG | RESPIRATION RATE: 16 BRPM | DIASTOLIC BLOOD PRESSURE: 70 MMHG

## 2017-04-07 LAB
ANION GAP SERPL CALCULATED.3IONS-SCNC: 12 MMOL/L (ref 3–16)
BUN BLDV-MCNC: 34 MG/DL (ref 7–20)
CALCIUM SERPL-MCNC: 8.2 MG/DL (ref 8.3–10.6)
CHLORIDE BLD-SCNC: 104 MMOL/L (ref 99–110)
CO2: 19 MMOL/L (ref 21–32)
CREAT SERPL-MCNC: 1.6 MG/DL (ref 0.6–1.1)
GFR AFRICAN AMERICAN: 40
GFR NON-AFRICAN AMERICAN: 33
GLUCOSE BLD-MCNC: 88 MG/DL (ref 70–99)
HCT VFR BLD CALC: 19.8 % (ref 36–48)
HEMOGLOBIN: 6.4 G/DL (ref 12–16)
MCH RBC QN AUTO: 34.3 PG (ref 26–34)
MCHC RBC AUTO-ENTMCNC: 32.1 G/DL (ref 31–36)
MCV RBC AUTO: 106.9 FL (ref 80–100)
PDW BLD-RTO: 21 % (ref 12.4–15.4)
PLATELET # BLD: 132 K/UL (ref 135–450)
PMV BLD AUTO: 6.8 FL (ref 5–10.5)
POTASSIUM SERPL-SCNC: 4.7 MMOL/L (ref 3.5–5.1)
RBC # BLD: 1.86 M/UL (ref 4–5.2)
SODIUM BLD-SCNC: 135 MMOL/L (ref 136–145)
WBC # BLD: 2.3 K/UL (ref 4–11)

## 2017-04-07 RX ORDER — LABETALOL HYDROCHLORIDE 5 MG/ML
5 INJECTION, SOLUTION INTRAVENOUS EVERY 10 MIN PRN
Status: DISCONTINUED | OUTPATIENT
Start: 2017-04-07 | End: 2017-04-08 | Stop reason: HOSPADM

## 2017-04-07 RX ORDER — SODIUM CHLORIDE 9 MG/ML
INJECTION, SOLUTION INTRAVENOUS CONTINUOUS
Status: DISCONTINUED | OUTPATIENT
Start: 2017-04-07 | End: 2017-04-08 | Stop reason: HOSPADM

## 2017-04-07 RX ORDER — CLINDAMYCIN PHOSPHATE 600 MG/50ML
600 INJECTION INTRAVENOUS ONCE
Status: DISCONTINUED | OUTPATIENT
Start: 2017-04-07 | End: 2017-04-07 | Stop reason: DRUGHIGH

## 2017-04-07 RX ORDER — SODIUM CHLORIDE 0.9 % (FLUSH) 0.9 %
10 SYRINGE (ML) INJECTION EVERY 12 HOURS SCHEDULED
Status: DISCONTINUED | OUTPATIENT
Start: 2017-04-07 | End: 2017-04-08 | Stop reason: HOSPADM

## 2017-04-07 RX ORDER — CLINDAMYCIN PHOSPHATE 900 MG/50ML
900 INJECTION INTRAVENOUS ONCE
Status: COMPLETED | OUTPATIENT
Start: 2017-04-07 | End: 2017-04-07

## 2017-04-07 RX ORDER — FENTANYL CITRATE 50 UG/ML
25 INJECTION, SOLUTION INTRAMUSCULAR; INTRAVENOUS EVERY 5 MIN PRN
Status: DISCONTINUED | OUTPATIENT
Start: 2017-04-07 | End: 2017-04-08 | Stop reason: HOSPADM

## 2017-04-07 RX ORDER — SODIUM CHLORIDE 0.9 % (FLUSH) 0.9 %
10 SYRINGE (ML) INJECTION PRN
Status: DISCONTINUED | OUTPATIENT
Start: 2017-04-07 | End: 2017-04-08 | Stop reason: HOSPADM

## 2017-04-07 RX ORDER — IPRATROPIUM BROMIDE AND ALBUTEROL SULFATE 2.5; .5 MG/3ML; MG/3ML
1 SOLUTION RESPIRATORY (INHALATION) ONCE
Status: DISCONTINUED | OUTPATIENT
Start: 2017-04-07 | End: 2017-04-08 | Stop reason: HOSPADM

## 2017-04-07 RX ORDER — PROMETHAZINE HYDROCHLORIDE 25 MG/ML
6.25 INJECTION, SOLUTION INTRAMUSCULAR; INTRAVENOUS
Status: ACTIVE | OUTPATIENT
Start: 2017-04-07 | End: 2017-04-07

## 2017-04-07 RX ADMIN — SODIUM CHLORIDE: 9 INJECTION, SOLUTION INTRAVENOUS at 13:02

## 2017-04-07 RX ADMIN — CLINDAMYCIN PHOSPHATE 900 MG: 900 INJECTION INTRAVENOUS at 14:00

## 2017-04-07 ASSESSMENT — PAIN SCALES - GENERAL
PAINLEVEL_OUTOF10: 0

## 2017-04-07 ASSESSMENT — PAIN DESCRIPTION - DESCRIPTORS: DESCRIPTORS: ACHING

## 2017-04-07 ASSESSMENT — ENCOUNTER SYMPTOMS: SHORTNESS OF BREATH: 0

## 2017-04-07 ASSESSMENT — PAIN - FUNCTIONAL ASSESSMENT: PAIN_FUNCTIONAL_ASSESSMENT: 0-10

## 2017-04-08 LAB
ORGANISM: ABNORMAL
URINE CULTURE, ROUTINE: ABNORMAL

## 2017-04-10 RX ORDER — FLUCONAZOLE 150 MG/1
150 TABLET ORAL ONCE
Qty: 1 TABLET | Refills: 1 | Status: SHIPPED | OUTPATIENT
Start: 2017-04-10 | End: 2017-07-17 | Stop reason: SDUPTHER

## 2017-05-03 DIAGNOSIS — T45.1X5A CHEMOTHERAPY-INDUCED NEUROPATHY (HCC): ICD-10-CM

## 2017-05-03 DIAGNOSIS — K62.89 PROCTODYNIA: ICD-10-CM

## 2017-05-03 DIAGNOSIS — G62.0 CHEMOTHERAPY-INDUCED NEUROPATHY (HCC): ICD-10-CM

## 2017-05-03 RX ORDER — NORTRIPTYLINE HYDROCHLORIDE 75 MG/1
CAPSULE ORAL
Qty: 30 CAPSULE | Refills: 3 | Status: SHIPPED | OUTPATIENT
Start: 2017-05-03 | End: 2017-08-21 | Stop reason: SDUPTHER

## 2017-05-04 ENCOUNTER — HOSPITAL ENCOUNTER (OUTPATIENT)
Dept: INTERVENTIONAL RADIOLOGY/VASCULAR | Age: 62
Discharge: HOME | End: 2017-05-04
Attending: INTERNAL MEDICINE | Admitting: INTERNAL MEDICINE

## 2017-05-04 VITALS
SYSTOLIC BLOOD PRESSURE: 127 MMHG | DIASTOLIC BLOOD PRESSURE: 58 MMHG | TEMPERATURE: 97 F | HEIGHT: 65 IN | HEART RATE: 81 BPM | OXYGEN SATURATION: 100 % | RESPIRATION RATE: 18 BRPM | WEIGHT: 131 LBS | BODY MASS INDEX: 21.83 KG/M2

## 2017-05-04 DIAGNOSIS — C18.7 MALIGNANT NEOPLASM OF SIGMOID COLON (HCC): ICD-10-CM

## 2017-05-04 DIAGNOSIS — Z93.3 COLOSTOMY STATUS (HCC): ICD-10-CM

## 2017-05-04 LAB
INR BLD: 1.12 (ref 0.85–1.15)
PLATELET # BLD: 123 K/UL (ref 135–450)
PROTHROMBIN TIME: 12.7 SEC (ref 9.6–13)

## 2017-05-04 RX ORDER — DIPHENHYDRAMINE HYDROCHLORIDE 50 MG/ML
50 INJECTION INTRAMUSCULAR; INTRAVENOUS ONCE
Status: COMPLETED | OUTPATIENT
Start: 2017-05-04 | End: 2017-05-04

## 2017-05-04 RX ORDER — ONDANSETRON 2 MG/ML
4 INJECTION INTRAMUSCULAR; INTRAVENOUS EVERY 8 HOURS PRN
Status: DISCONTINUED | OUTPATIENT
Start: 2017-05-04 | End: 2017-05-05 | Stop reason: HOSPADM

## 2017-05-04 RX ORDER — MIDAZOLAM HYDROCHLORIDE 1 MG/ML
INJECTION INTRAMUSCULAR; INTRAVENOUS DAILY PRN
Status: COMPLETED | OUTPATIENT
Start: 2017-05-04 | End: 2017-05-04

## 2017-05-04 RX ORDER — SODIUM CHLORIDE 9 MG/ML
INJECTION, SOLUTION INTRAVENOUS CONTINUOUS
Status: DISCONTINUED | OUTPATIENT
Start: 2017-05-04 | End: 2017-05-05 | Stop reason: HOSPADM

## 2017-05-04 RX ORDER — CIPROFLOXACIN 2 MG/ML
400 INJECTION, SOLUTION INTRAVENOUS ONCE
Status: COMPLETED | OUTPATIENT
Start: 2017-05-04 | End: 2017-05-04

## 2017-05-04 RX ADMIN — CIPROFLOXACIN 400 MG: 2 INJECTION, SOLUTION INTRAVENOUS at 13:46

## 2017-05-04 RX ADMIN — MIDAZOLAM HYDROCHLORIDE 1 MG: 1 INJECTION INTRAMUSCULAR; INTRAVENOUS at 14:24

## 2017-05-04 RX ADMIN — DIPHENHYDRAMINE HYDROCHLORIDE 50 MG: 50 INJECTION INTRAMUSCULAR; INTRAVENOUS at 14:21

## 2017-05-04 RX ADMIN — SODIUM CHLORIDE: 9 INJECTION, SOLUTION INTRAVENOUS at 13:46

## 2017-05-04 ASSESSMENT — PAIN - FUNCTIONAL ASSESSMENT: PAIN_FUNCTIONAL_ASSESSMENT: 0-10

## 2017-05-04 ASSESSMENT — PAIN SCALES - GENERAL
PAINLEVEL_OUTOF10: 0

## 2017-05-24 ENCOUNTER — HOSPITAL ENCOUNTER (OUTPATIENT)
Dept: INFUSION THERAPY | Age: 62
Discharge: HOME | End: 2017-05-31
Attending: INTERNAL MEDICINE | Admitting: INTERNAL MEDICINE

## 2017-05-24 VITALS
SYSTOLIC BLOOD PRESSURE: 121 MMHG | DIASTOLIC BLOOD PRESSURE: 68 MMHG | TEMPERATURE: 98 F | RESPIRATION RATE: 18 BRPM | HEART RATE: 96 BPM | OXYGEN SATURATION: 100 %

## 2017-05-24 LAB
ABO/RH: NORMAL
ANTIBODY SCREEN: NORMAL
HCT VFR BLD CALC: 20.8 % (ref 36–48)
HEMOGLOBIN: 6.6 G/DL (ref 12–16)

## 2017-05-24 RX ORDER — 0.9 % SODIUM CHLORIDE 0.9 %
250 INTRAVENOUS SOLUTION INTRAVENOUS ONCE
Status: CANCELLED | OUTPATIENT
Start: 2017-05-24 | End: 2017-05-24

## 2017-05-24 RX ORDER — SODIUM CHLORIDE 0.9 % (FLUSH) 0.9 %
10 SYRINGE (ML) INJECTION PRN
Status: CANCELLED | OUTPATIENT
Start: 2017-05-24

## 2017-05-24 ASSESSMENT — PAIN SCALES - GENERAL: PAINLEVEL_OUTOF10: 0

## 2017-05-25 ENCOUNTER — HOSPITAL ENCOUNTER (OUTPATIENT)
Dept: INFUSION THERAPY | Age: 62
Discharge: HOME | End: 2017-05-26
Admitting: INTERNAL MEDICINE

## 2017-05-25 VITALS
HEIGHT: 65 IN | RESPIRATION RATE: 17 BRPM | SYSTOLIC BLOOD PRESSURE: 125 MMHG | DIASTOLIC BLOOD PRESSURE: 76 MMHG | HEART RATE: 76 BPM | WEIGHT: 135 LBS | OXYGEN SATURATION: 98 % | TEMPERATURE: 97.8 F | BODY MASS INDEX: 22.49 KG/M2

## 2017-05-25 LAB
BLOOD BANK DISPENSE STATUS: NORMAL
BLOOD BANK PRODUCT CODE: NORMAL
BPU ID: NORMAL
DESCRIPTION BLOOD BANK: NORMAL

## 2017-05-25 RX ORDER — SODIUM CHLORIDE 0.9 % (FLUSH) 0.9 %
10 SYRINGE (ML) INJECTION PRN
Status: DISCONTINUED | OUTPATIENT
Start: 2017-05-25 | End: 2018-05-01 | Stop reason: HOSPADM

## 2017-05-25 RX ORDER — 0.9 % SODIUM CHLORIDE 0.9 %
250 INTRAVENOUS SOLUTION INTRAVENOUS ONCE
Status: COMPLETED | OUTPATIENT
Start: 2017-05-25 | End: 2017-05-26

## 2017-05-25 RX ADMIN — Medication 20 ML: at 16:01

## 2017-05-25 RX ADMIN — Medication 20 ML: at 13:01

## 2017-05-25 RX ADMIN — Medication 250 ML: at 13:01

## 2017-07-05 ENCOUNTER — HOSPITAL ENCOUNTER (OUTPATIENT)
Dept: INTERVENTIONAL RADIOLOGY/VASCULAR | Age: 62
Discharge: HOME | End: 2017-07-05
Attending: INTERNAL MEDICINE | Admitting: INTERNAL MEDICINE

## 2017-07-05 VITALS
HEART RATE: 70 BPM | HEIGHT: 65 IN | TEMPERATURE: 97.3 F | SYSTOLIC BLOOD PRESSURE: 153 MMHG | RESPIRATION RATE: 16 BRPM | DIASTOLIC BLOOD PRESSURE: 72 MMHG | BODY MASS INDEX: 22.15 KG/M2 | OXYGEN SATURATION: 98 % | WEIGHT: 132.94 LBS

## 2017-07-05 DIAGNOSIS — Z93.6 NEPHROSTOMY STATUS (HCC): ICD-10-CM

## 2017-07-05 DIAGNOSIS — C53.9 MALIGNANT NEOPLASM OF CERVIX, UNSPECIFIED SITE (HCC): ICD-10-CM

## 2017-07-05 DIAGNOSIS — C18.7 MALIGNANT NEOPLASM OF SIGMOID COLON (HCC): ICD-10-CM

## 2017-07-05 LAB
INR BLD: 1.28 (ref 0.85–1.15)
PLATELET # BLD: 143 K/UL (ref 135–450)
PROTHROMBIN TIME: 14.5 SEC (ref 9.6–13)

## 2017-07-05 RX ORDER — SODIUM CHLORIDE 9 MG/ML
INJECTION, SOLUTION INTRAVENOUS CONTINUOUS
Status: DISCONTINUED | OUTPATIENT
Start: 2017-07-05 | End: 2017-07-06 | Stop reason: HOSPADM

## 2017-07-05 RX ORDER — MIDAZOLAM HYDROCHLORIDE 1 MG/ML
INJECTION INTRAMUSCULAR; INTRAVENOUS DAILY PRN
Status: COMPLETED | OUTPATIENT
Start: 2017-07-05 | End: 2017-07-05

## 2017-07-05 RX ORDER — DIPHENHYDRAMINE HYDROCHLORIDE 50 MG/ML
INJECTION INTRAMUSCULAR; INTRAVENOUS DAILY PRN
Status: COMPLETED | OUTPATIENT
Start: 2017-07-05 | End: 2017-07-05

## 2017-07-05 RX ORDER — FENTANYL CITRATE 50 UG/ML
INJECTION, SOLUTION INTRAMUSCULAR; INTRAVENOUS DAILY PRN
Status: COMPLETED | OUTPATIENT
Start: 2017-07-05 | End: 2017-07-05

## 2017-07-05 RX ADMIN — MIDAZOLAM HYDROCHLORIDE 1 MG: 1 INJECTION INTRAMUSCULAR; INTRAVENOUS at 11:22

## 2017-07-05 RX ADMIN — FENTANYL CITRATE 50 MCG: 50 INJECTION, SOLUTION INTRAMUSCULAR; INTRAVENOUS at 11:25

## 2017-07-05 RX ADMIN — FENTANYL CITRATE 50 MCG: 50 INJECTION, SOLUTION INTRAMUSCULAR; INTRAVENOUS at 11:21

## 2017-07-05 RX ADMIN — MIDAZOLAM HYDROCHLORIDE 1 MG: 1 INJECTION INTRAMUSCULAR; INTRAVENOUS at 11:24

## 2017-07-05 RX ADMIN — DIPHENHYDRAMINE HYDROCHLORIDE 25 MG: 50 INJECTION INTRAMUSCULAR; INTRAVENOUS at 11:33

## 2017-07-05 ASSESSMENT — PAIN SCALES - GENERAL
PAINLEVEL_OUTOF10: 0

## 2017-07-05 ASSESSMENT — PAIN - FUNCTIONAL ASSESSMENT: PAIN_FUNCTIONAL_ASSESSMENT: 0-10

## 2017-07-05 ASSESSMENT — PAIN DESCRIPTION - DESCRIPTORS: DESCRIPTORS: TENDER

## 2017-07-06 ENCOUNTER — OFFICE VISIT (OUTPATIENT)
Dept: INTERNAL MEDICINE CLINIC | Age: 62
End: 2017-07-06

## 2017-07-06 VITALS
SYSTOLIC BLOOD PRESSURE: 108 MMHG | BODY MASS INDEX: 22.33 KG/M2 | WEIGHT: 134 LBS | HEIGHT: 65 IN | HEART RATE: 68 BPM | DIASTOLIC BLOOD PRESSURE: 64 MMHG

## 2017-07-06 DIAGNOSIS — F17.200 TOBACCO DEPENDENCE: ICD-10-CM

## 2017-07-06 DIAGNOSIS — I77.1 RIGHT ILIAC ARTERY STENOSIS (HCC): ICD-10-CM

## 2017-07-06 DIAGNOSIS — N18.30 CHRONIC KIDNEY DISEASE, STAGE III (MODERATE) (HCC): ICD-10-CM

## 2017-07-06 DIAGNOSIS — C78.00 METASTATIC ADENOCARCINOMA TO LUNG, UNSPECIFIED LATERALITY (HCC): ICD-10-CM

## 2017-07-06 DIAGNOSIS — Z93.6 NEPHROSTOMY STATUS (HCC): ICD-10-CM

## 2017-07-06 DIAGNOSIS — C18.7 MALIGNANT NEOPLASM OF SIGMOID COLON (HCC): ICD-10-CM

## 2017-07-06 DIAGNOSIS — I10 ESSENTIAL HYPERTENSION: Primary | ICD-10-CM

## 2017-07-06 DIAGNOSIS — R60.0 PEDAL EDEMA: ICD-10-CM

## 2017-07-06 DIAGNOSIS — J45.40 MODERATE PERSISTENT ASTHMA WITHOUT COMPLICATION: ICD-10-CM

## 2017-07-06 PROCEDURE — G8427 DOCREV CUR MEDS BY ELIG CLIN: HCPCS | Performed by: FAMILY MEDICINE

## 2017-07-06 PROCEDURE — G8420 CALC BMI NORM PARAMETERS: HCPCS | Performed by: FAMILY MEDICINE

## 2017-07-06 PROCEDURE — 3017F COLORECTAL CA SCREEN DOC REV: CPT | Performed by: FAMILY MEDICINE

## 2017-07-06 PROCEDURE — 99214 OFFICE O/P EST MOD 30 MIN: CPT | Performed by: FAMILY MEDICINE

## 2017-07-06 PROCEDURE — 4004F PT TOBACCO SCREEN RCVD TLK: CPT | Performed by: FAMILY MEDICINE

## 2017-07-06 PROCEDURE — 3014F SCREEN MAMMO DOC REV: CPT | Performed by: FAMILY MEDICINE

## 2017-07-06 PROCEDURE — G8598 ASA/ANTIPLAT THER USED: HCPCS | Performed by: FAMILY MEDICINE

## 2017-07-06 RX ORDER — FUROSEMIDE 20 MG/1
TABLET ORAL
Qty: 60 TABLET | Refills: 2 | Status: SHIPPED | OUTPATIENT
Start: 2017-07-06 | End: 2017-09-27 | Stop reason: SDUPTHER

## 2017-07-08 PROBLEM — I70.234 ATHEROSCLEROSIS OF NATIVE ARTERY OF RIGHT LOWER EXTREMITY WITH ULCERATION OF MIDFOOT (HCC): Status: RESOLVED | Noted: 2017-03-07 | Resolved: 2017-07-08

## 2017-07-08 PROBLEM — I74.3 ARTERIAL EMBOLISM AND THROMBOSIS OF LOWER EXTREMITY (HCC): Status: RESOLVED | Noted: 2017-03-30 | Resolved: 2017-07-08

## 2017-07-08 ASSESSMENT — ENCOUNTER SYMPTOMS
SHORTNESS OF BREATH: 0
COUGH: 0
WHEEZING: 0

## 2017-07-17 RX ORDER — FLUCONAZOLE 150 MG/1
150 TABLET ORAL ONCE
Qty: 1 TABLET | Refills: 1 | Status: SHIPPED | OUTPATIENT
Start: 2017-07-17 | End: 2017-07-17

## 2017-08-21 DIAGNOSIS — G62.0 CHEMOTHERAPY-INDUCED NEUROPATHY (HCC): ICD-10-CM

## 2017-08-21 DIAGNOSIS — T45.1X5A CHEMOTHERAPY-INDUCED NEUROPATHY (HCC): ICD-10-CM

## 2017-08-21 DIAGNOSIS — K62.89 PROCTODYNIA: ICD-10-CM

## 2017-08-21 RX ORDER — NORTRIPTYLINE HYDROCHLORIDE 75 MG/1
CAPSULE ORAL
Qty: 30 CAPSULE | Refills: 3 | Status: SHIPPED | OUTPATIENT
Start: 2017-08-21 | End: 2017-12-08 | Stop reason: SDUPTHER

## 2017-08-22 ENCOUNTER — HOSPITAL ENCOUNTER (OUTPATIENT)
Dept: CT IMAGING | Age: 62
Discharge: HOME | End: 2017-08-22
Attending: INTERNAL MEDICINE | Admitting: INTERNAL MEDICINE

## 2017-08-22 DIAGNOSIS — C78.7 METASTASIS TO LIVER (HCC): ICD-10-CM

## 2017-08-22 DIAGNOSIS — C78.00 METASTATIC ADENOCARCINOMA TO LUNG, UNSPECIFIED LATERALITY (HCC): ICD-10-CM

## 2017-08-22 DIAGNOSIS — C18.7 MALIGNANT NEOPLASM OF SIGMOID COLON (HCC): ICD-10-CM

## 2017-08-22 RX ORDER — CIPROFLOXACIN 2 MG/ML
400 INJECTION, SOLUTION INTRAVENOUS ONCE
Status: CANCELLED | OUTPATIENT
Start: 2017-08-23

## 2017-08-22 RX ORDER — SODIUM CHLORIDE 9 MG/ML
INJECTION, SOLUTION INTRAVENOUS ONCE
Status: CANCELLED | OUTPATIENT
Start: 2017-08-22

## 2017-08-23 ENCOUNTER — HOSPITAL ENCOUNTER (OUTPATIENT)
Dept: INTERVENTIONAL RADIOLOGY/VASCULAR | Age: 62
Discharge: HOME | End: 2017-08-23
Attending: INTERNAL MEDICINE | Admitting: INTERNAL MEDICINE

## 2017-08-23 VITALS
SYSTOLIC BLOOD PRESSURE: 105 MMHG | TEMPERATURE: 97.1 F | RESPIRATION RATE: 16 BRPM | DIASTOLIC BLOOD PRESSURE: 71 MMHG | OXYGEN SATURATION: 100 % | HEART RATE: 94 BPM

## 2017-08-23 DIAGNOSIS — C18.7 MALIGNANT NEOPLASM OF SIGMOID COLON (HCC): ICD-10-CM

## 2017-08-23 LAB
INR BLD: 1.39 (ref 0.85–1.15)
PROTHROMBIN TIME: 15.7 SEC (ref 9.6–13)

## 2017-08-23 RX ORDER — MIDAZOLAM HYDROCHLORIDE 1 MG/ML
INJECTION INTRAMUSCULAR; INTRAVENOUS DAILY PRN
Status: COMPLETED | OUTPATIENT
Start: 2017-08-23 | End: 2017-08-23

## 2017-08-23 RX ORDER — FENTANYL CITRATE 50 UG/ML
INJECTION, SOLUTION INTRAMUSCULAR; INTRAVENOUS DAILY PRN
Status: COMPLETED | OUTPATIENT
Start: 2017-08-23 | End: 2017-08-23

## 2017-08-23 RX ADMIN — MIDAZOLAM HYDROCHLORIDE 1 MG: 1 INJECTION INTRAMUSCULAR; INTRAVENOUS at 09:33

## 2017-08-23 RX ADMIN — FENTANYL CITRATE 50 MCG: 50 INJECTION, SOLUTION INTRAMUSCULAR; INTRAVENOUS at 09:32

## 2017-09-18 DIAGNOSIS — F32.9 REACTIVE DEPRESSION (SITUATIONAL): ICD-10-CM

## 2017-09-18 DIAGNOSIS — I10 ESSENTIAL HYPERTENSION: ICD-10-CM

## 2017-09-18 DIAGNOSIS — R63.4 ABNORMAL WEIGHT LOSS: ICD-10-CM

## 2017-09-18 RX ORDER — MIRTAZAPINE 15 MG/1
TABLET, FILM COATED ORAL
Qty: 30 TABLET | Refills: 5 | Status: SHIPPED | OUTPATIENT
Start: 2017-09-18 | End: 2018-02-13 | Stop reason: SDUPTHER

## 2017-09-18 RX ORDER — LISINOPRIL 5 MG/1
TABLET ORAL
Qty: 30 TABLET | Refills: 5 | Status: SHIPPED | OUTPATIENT
Start: 2017-09-18 | End: 2018-02-20 | Stop reason: SDUPTHER

## 2017-09-27 DIAGNOSIS — R60.0 PEDAL EDEMA: ICD-10-CM

## 2017-09-27 DIAGNOSIS — N18.30 CHRONIC KIDNEY DISEASE, STAGE III (MODERATE) (HCC): ICD-10-CM

## 2017-09-27 RX ORDER — FUROSEMIDE 20 MG/1
TABLET ORAL
Qty: 60 TABLET | Refills: 2 | Status: SHIPPED | OUTPATIENT
Start: 2017-09-27 | End: 2017-11-13 | Stop reason: ALTCHOICE

## 2017-10-10 ENCOUNTER — HOSPITAL ENCOUNTER (OUTPATIENT)
Dept: INFUSION THERAPY | Age: 62
Discharge: HOME | End: 2017-10-31
Attending: INTERNAL MEDICINE | Admitting: INTERNAL MEDICINE

## 2017-10-10 LAB
ABO/RH: NORMAL
ANTIBODY SCREEN: NORMAL

## 2017-10-10 RX ORDER — SODIUM CHLORIDE 0.9 % (FLUSH) 0.9 %
10 SYRINGE (ML) INJECTION PRN
Status: DISCONTINUED | OUTPATIENT
Start: 2017-10-10 | End: 2017-11-01 | Stop reason: HOSPADM

## 2017-10-11 ENCOUNTER — HOSPITAL ENCOUNTER (OUTPATIENT)
Dept: INFUSION THERAPY | Age: 62
Discharge: HOME | End: 2017-10-12
Admitting: INTERNAL MEDICINE

## 2017-10-11 VITALS
WEIGHT: 130 LBS | RESPIRATION RATE: 16 BRPM | HEART RATE: 88 BPM | HEIGHT: 65 IN | DIASTOLIC BLOOD PRESSURE: 75 MMHG | SYSTOLIC BLOOD PRESSURE: 114 MMHG | OXYGEN SATURATION: 96 % | BODY MASS INDEX: 21.66 KG/M2 | TEMPERATURE: 97.7 F

## 2017-10-11 LAB
BLOOD BANK DISPENSE STATUS: NORMAL
BLOOD BANK DISPENSE STATUS: NORMAL
BLOOD BANK PRODUCT CODE: NORMAL
BLOOD BANK PRODUCT CODE: NORMAL
BPU ID: NORMAL
BPU ID: NORMAL
DESCRIPTION BLOOD BANK: NORMAL
DESCRIPTION BLOOD BANK: NORMAL

## 2017-10-11 RX ORDER — CIPROFLOXACIN 2 MG/ML
400 INJECTION, SOLUTION INTRAVENOUS
Status: CANCELLED | OUTPATIENT
Start: 2017-10-11 | End: 2017-10-11

## 2017-10-11 RX ORDER — SODIUM CHLORIDE 9 MG/ML
INJECTION, SOLUTION INTRAVENOUS CONTINUOUS
Status: CANCELLED | OUTPATIENT
Start: 2017-10-11

## 2017-10-11 RX ORDER — ACETAMINOPHEN 325 MG/1
650 TABLET ORAL ONCE
Status: COMPLETED | OUTPATIENT
Start: 2017-10-11 | End: 2017-10-11

## 2017-10-11 RX ORDER — 0.9 % SODIUM CHLORIDE 0.9 %
250 INTRAVENOUS SOLUTION INTRAVENOUS CONTINUOUS
Status: DISCONTINUED | OUTPATIENT
Start: 2017-10-11 | End: 2018-05-01 | Stop reason: HOSPADM

## 2017-10-11 RX ORDER — SODIUM CHLORIDE 0.9 % (FLUSH) 0.9 %
10 SYRINGE (ML) INJECTION PRN
Status: DISCONTINUED | OUTPATIENT
Start: 2017-10-11 | End: 2018-05-01 | Stop reason: HOSPADM

## 2017-10-11 RX ORDER — DIPHENHYDRAMINE HCL 25 MG
25 TABLET ORAL ONCE
Status: COMPLETED | OUTPATIENT
Start: 2017-10-11 | End: 2017-10-11

## 2017-10-11 RX ADMIN — Medication 250 ML: at 09:00

## 2017-10-11 RX ADMIN — ACETAMINOPHEN 650 MG: 325 TABLET ORAL at 09:00

## 2017-10-11 RX ADMIN — Medication 20 ML: at 09:00

## 2017-10-11 RX ADMIN — Medication 25 MG: at 09:00

## 2017-10-11 ASSESSMENT — PAIN SCALES - GENERAL: PAINLEVEL_OUTOF10: 0

## 2017-10-12 ENCOUNTER — HOSPITAL ENCOUNTER (OUTPATIENT)
Dept: INTERVENTIONAL RADIOLOGY/VASCULAR | Age: 62
Discharge: HOME | End: 2017-10-12
Attending: INTERNAL MEDICINE | Admitting: INTERNAL MEDICINE

## 2017-10-12 VITALS
DIASTOLIC BLOOD PRESSURE: 52 MMHG | SYSTOLIC BLOOD PRESSURE: 112 MMHG | HEIGHT: 65 IN | OXYGEN SATURATION: 100 % | WEIGHT: 131.61 LBS | BODY MASS INDEX: 21.93 KG/M2 | HEART RATE: 88 BPM | RESPIRATION RATE: 20 BRPM | TEMPERATURE: 96.9 F

## 2017-10-12 DIAGNOSIS — N18.30 CHRONIC KIDNEY DISEASE, STAGE III (MODERATE) (HCC): ICD-10-CM

## 2017-10-12 DIAGNOSIS — C18.7 MALIGNANT NEOPLASM OF SIGMOID COLON (HCC): ICD-10-CM

## 2017-10-12 LAB
INR BLD: 1.45 (ref 0.85–1.15)
PLATELET # BLD: 153 K/UL (ref 135–450)
PROTHROMBIN TIME: 16.4 SEC (ref 9.6–13)

## 2017-10-12 RX ORDER — CIPROFLOXACIN/CIPROFLOXA HCL 500 MG
TABLET,EXTENDED RELEASE MULTIPHASE 24 HR ORAL 2 TIMES DAILY
COMMUNITY
End: 2017-11-13 | Stop reason: ALTCHOICE

## 2017-10-12 RX ORDER — CIPROFLOXACIN 2 MG/ML
400 INJECTION, SOLUTION INTRAVENOUS
Status: COMPLETED | OUTPATIENT
Start: 2017-10-12 | End: 2017-10-12

## 2017-10-12 RX ORDER — ONDANSETRON 2 MG/ML
4 INJECTION INTRAMUSCULAR; INTRAVENOUS EVERY 8 HOURS PRN
Status: DISCONTINUED | OUTPATIENT
Start: 2017-10-12 | End: 2017-10-13 | Stop reason: HOSPADM

## 2017-10-12 RX ORDER — MIDAZOLAM HYDROCHLORIDE 1 MG/ML
INJECTION INTRAMUSCULAR; INTRAVENOUS DAILY PRN
Status: COMPLETED | OUTPATIENT
Start: 2017-10-12 | End: 2017-10-12

## 2017-10-12 RX ORDER — DIPHENHYDRAMINE HYDROCHLORIDE 50 MG/ML
INJECTION INTRAMUSCULAR; INTRAVENOUS DAILY PRN
Status: COMPLETED | OUTPATIENT
Start: 2017-10-12 | End: 2017-10-12

## 2017-10-12 RX ORDER — SODIUM CHLORIDE 9 MG/ML
INJECTION, SOLUTION INTRAVENOUS CONTINUOUS
Status: DISCONTINUED | OUTPATIENT
Start: 2017-10-12 | End: 2017-10-13 | Stop reason: HOSPADM

## 2017-10-12 RX ORDER — FENTANYL CITRATE 50 UG/ML
INJECTION, SOLUTION INTRAMUSCULAR; INTRAVENOUS DAILY PRN
Status: COMPLETED | OUTPATIENT
Start: 2017-10-12 | End: 2017-10-12

## 2017-10-12 RX ADMIN — MIDAZOLAM HYDROCHLORIDE 1 MG: 1 INJECTION INTRAMUSCULAR; INTRAVENOUS at 09:18

## 2017-10-12 RX ADMIN — FENTANYL CITRATE 50 MCG: 50 INJECTION, SOLUTION INTRAMUSCULAR; INTRAVENOUS at 09:18

## 2017-10-12 RX ADMIN — FENTANYL CITRATE 50 MCG: 50 INJECTION, SOLUTION INTRAMUSCULAR; INTRAVENOUS at 09:26

## 2017-10-12 RX ADMIN — MIDAZOLAM HYDROCHLORIDE 1 MG: 1 INJECTION INTRAMUSCULAR; INTRAVENOUS at 09:20

## 2017-10-12 RX ADMIN — CIPROFLOXACIN 400 MG: 2 INJECTION, SOLUTION INTRAVENOUS at 08:42

## 2017-10-12 RX ADMIN — DIPHENHYDRAMINE HYDROCHLORIDE 25 MG: 50 INJECTION INTRAMUSCULAR; INTRAVENOUS at 09:42

## 2017-10-12 RX ADMIN — SODIUM CHLORIDE: 9 INJECTION, SOLUTION INTRAVENOUS at 08:42

## 2017-10-12 ASSESSMENT — PAIN SCALES - GENERAL
PAINLEVEL_OUTOF10: 0

## 2017-10-12 ASSESSMENT — PAIN - FUNCTIONAL ASSESSMENT: PAIN_FUNCTIONAL_ASSESSMENT: 0-10

## 2017-10-12 NOTE — PRE SEDATION
mouth daily as needed  4/27/16   Nohemy Robles MD     Coumadin Use Last 7 Days:  no  Antiplatelet drug therapy use last 7 days: yes - asa held  Other anticoagulant use last 7 days: no  Additional Medication Information:        Pre-Sedation Documentation and Exam:   I have reviewed the patient's history and review of systems.     Mallampati Airway Assessment:  Mallampati Class II - (soft palate, fauces & uvula are visible)    Prior History of Anesthesia Complications:   none    ASA Classification:  Class 3 - A patient with severe systemic disease that limits activity but is not incapacitating    Sedation/ Anesthesia Plan:   intravenous sedation    Medications Planned:   midazolam (Versed) intravenously and fentanyl intravenously    Patient is an appropriate candidate for plan of sedation: yes    Electronically signed by Ye Burr MD on 10/12/2017 at 8:56 AM

## 2017-10-12 NOTE — PROGRESS NOTES
Tolerated water & cookies po well. Patient & sister express understanding of discharge instructions.

## 2017-11-01 ENCOUNTER — HOSPITAL ENCOUNTER (OUTPATIENT)
Dept: INFUSION THERAPY | Age: 62
Discharge: HOME | End: 2017-11-30
Attending: INTERNAL MEDICINE | Admitting: INTERNAL MEDICINE

## 2017-11-13 ENCOUNTER — HOSPITAL ENCOUNTER (OUTPATIENT)
Dept: INFUSION THERAPY | Age: 62
Discharge: HOME | End: 2017-11-14
Admitting: INTERNAL MEDICINE

## 2017-11-13 VITALS
OXYGEN SATURATION: 97 % | DIASTOLIC BLOOD PRESSURE: 71 MMHG | RESPIRATION RATE: 17 BRPM | HEART RATE: 98 BPM | SYSTOLIC BLOOD PRESSURE: 99 MMHG | TEMPERATURE: 97.8 F

## 2017-11-13 LAB
ABO/RH: NORMAL
ANTIBODY SCREEN: NORMAL

## 2017-11-13 RX ORDER — TETRACYCLINE HYDROCHLORIDE 250 MG/1
250 CAPSULE ORAL 2 TIMES DAILY
Status: ON HOLD | COMMUNITY
End: 2018-10-05 | Stop reason: HOSPADM

## 2017-11-13 RX ORDER — 0.9 % SODIUM CHLORIDE 0.9 %
250 INTRAVENOUS SOLUTION INTRAVENOUS ONCE
Status: CANCELLED | OUTPATIENT
Start: 2017-11-13 | End: 2017-11-13

## 2017-11-13 RX ORDER — SODIUM CHLORIDE 0.9 % (FLUSH) 0.9 %
10 SYRINGE (ML) INJECTION EVERY 12 HOURS
Status: CANCELLED | OUTPATIENT
Start: 2017-11-13

## 2017-11-14 ENCOUNTER — HOSPITAL ENCOUNTER (OUTPATIENT)
Dept: INFUSION THERAPY | Age: 62
Discharge: HOME | End: 2017-11-15
Admitting: INTERNAL MEDICINE

## 2017-11-14 VITALS
DIASTOLIC BLOOD PRESSURE: 70 MMHG | HEART RATE: 98 BPM | OXYGEN SATURATION: 95 % | SYSTOLIC BLOOD PRESSURE: 110 MMHG | TEMPERATURE: 98.1 F | RESPIRATION RATE: 17 BRPM

## 2017-11-14 RX ORDER — SODIUM CHLORIDE 0.9 % (FLUSH) 0.9 %
10 SYRINGE (ML) INJECTION EVERY 12 HOURS
Status: DISCONTINUED | OUTPATIENT
Start: 2017-11-14 | End: 2018-05-01 | Stop reason: HOSPADM

## 2017-11-14 RX ORDER — 0.9 % SODIUM CHLORIDE 0.9 %
250 INTRAVENOUS SOLUTION INTRAVENOUS ONCE
Status: COMPLETED | OUTPATIENT
Start: 2017-11-14 | End: 2017-11-14

## 2017-11-14 RX ADMIN — Medication 10 ML: at 11:31

## 2017-11-14 RX ADMIN — Medication 250 ML: at 11:31

## 2017-11-17 ENCOUNTER — OFFICE VISIT (OUTPATIENT)
Dept: INTERNAL MEDICINE CLINIC | Age: 62
End: 2017-11-17

## 2017-11-17 VITALS
DIASTOLIC BLOOD PRESSURE: 72 MMHG | HEIGHT: 65 IN | SYSTOLIC BLOOD PRESSURE: 118 MMHG | BODY MASS INDEX: 21.99 KG/M2 | HEART RATE: 88 BPM | WEIGHT: 132 LBS

## 2017-11-17 DIAGNOSIS — N18.4 CHRONIC KIDNEY DISEASE (CKD), STAGE IV (SEVERE) (HCC): ICD-10-CM

## 2017-11-17 DIAGNOSIS — Z23 NEED FOR PNEUMOCOCCAL VACCINATION: ICD-10-CM

## 2017-11-17 DIAGNOSIS — Z23 INFLUENZA VACCINE NEEDED: ICD-10-CM

## 2017-11-17 DIAGNOSIS — N18.30 CHRONIC KIDNEY DISEASE, STAGE III (MODERATE) (HCC): ICD-10-CM

## 2017-11-17 DIAGNOSIS — C18.7 MALIGNANT NEOPLASM OF SIGMOID COLON (HCC): Primary | ICD-10-CM

## 2017-11-17 DIAGNOSIS — R26.89 POOR BALANCE: ICD-10-CM

## 2017-11-17 PROCEDURE — G8598 ASA/ANTIPLAT THER USED: HCPCS | Performed by: FAMILY MEDICINE

## 2017-11-17 PROCEDURE — G0009 ADMIN PNEUMOCOCCAL VACCINE: HCPCS | Performed by: FAMILY MEDICINE

## 2017-11-17 PROCEDURE — G8427 DOCREV CUR MEDS BY ELIG CLIN: HCPCS | Performed by: FAMILY MEDICINE

## 2017-11-17 PROCEDURE — 90732 PPSV23 VACC 2 YRS+ SUBQ/IM: CPT | Performed by: FAMILY MEDICINE

## 2017-11-17 PROCEDURE — 90686 IIV4 VACC NO PRSV 0.5 ML IM: CPT | Performed by: FAMILY MEDICINE

## 2017-11-17 PROCEDURE — G8420 CALC BMI NORM PARAMETERS: HCPCS | Performed by: FAMILY MEDICINE

## 2017-11-17 PROCEDURE — 3014F SCREEN MAMMO DOC REV: CPT | Performed by: FAMILY MEDICINE

## 2017-11-17 PROCEDURE — G0008 ADMIN INFLUENZA VIRUS VAC: HCPCS | Performed by: FAMILY MEDICINE

## 2017-11-17 PROCEDURE — 4004F PT TOBACCO SCREEN RCVD TLK: CPT | Performed by: FAMILY MEDICINE

## 2017-11-17 PROCEDURE — 3017F COLORECTAL CA SCREEN DOC REV: CPT | Performed by: FAMILY MEDICINE

## 2017-11-17 PROCEDURE — G8484 FLU IMMUNIZE NO ADMIN: HCPCS | Performed by: FAMILY MEDICINE

## 2017-11-17 PROCEDURE — 99213 OFFICE O/P EST LOW 20 MIN: CPT | Performed by: FAMILY MEDICINE

## 2017-11-17 NOTE — PROGRESS NOTES
thrombocytopenia    Right leg claudication (HCC)    Right iliac artery stenosis [facility])         Outpatient Prescriptions Marked as Taking for the 11/17/17 encounter (Office Visit) with Juno Gomes MD   Medication Sig Dispense Refill    tetracycline (ACHROMYCIN;SUMYCIN) 250 MG capsule Take 250 mg by mouth 4 times daily      lisinopril (PRINIVIL;ZESTRIL) 5 MG tablet TAKE 1 TABLET EVERY DAY 30 tablet 5    mirtazapine (REMERON) 15 MG tablet TAKE 1 TABLET BY MOUTH NIGHTLY 30 tablet 5    trifluridine-tipiracil (LONSURF) 20-8. 19 MG TABS tablet Take 3 tabs po bid Mon - Fri D1-5 and D8-12 of 28 day cycle 60 tablet 0    nortriptyline (PAMELOR) 75 MG capsule TAKE 1 CAPSULE BY MOUTH NIGHTLY 30 capsule 3    capecitabine (XELODA) 500 MG chemo tablet Take 2 tabs po am, 1 tab po daily for 10 days every 21 days 30 tablet 0    sulfamethoxazole-trimethoprim (BACTRIM DS;SEPTRA DS) 800-160 MG per tablet TAKE 1 TABLET BY MOUTH EVERY EVENING 30 tablet 12    aspirin EC 81 MG EC tablet Take 1 tablet by mouth daily 30 tablet 3    fluticasone-vilanterol (BREO ELLIPTA) 100-25 MCG/INH AEPB 1 inhalation once a day. Rinse mouth out well after each use. (Patient taking differently: Inhale 1 puff into the lungs daily as needed ) 1 each 5    sennosides-docusate sodium (SENOKOT-S) 8.6-50 MG tablet Take 2 tablets by mouth daily (Patient taking differently: Take 2 tablets by mouth daily as needed ) 60 tablet 11       Social History   Substance Use Topics    Smoking status: Current Every Day Smoker     Packs/day: 1.00     Years: 37.00     Types: Cigarettes    Smokeless tobacco: Never Used    Alcohol use No             Review of Systems    Objective:   Physical Exam   Constitutional: She appears well-developed and well-nourished. She has a sickly appearance. Chronically ill. Smells of tobacco.  Appears to be in some pain. Cardiovascular: Normal rate, regular rhythm, S1 normal, S2 normal and intact distal pulses.   Exam reveals

## 2017-11-17 NOTE — PATIENT INSTRUCTIONS
Practice balancing on one leg at a time. Do chair or counter squats. If too weak a walker is more likely to prevent a fall than a cane. PLEASE BRING ME A COPY OF YOUR LABS NEXT TIME. I am especially interested in CMP = kidney and liver tests. Set alarm for 30 minutes for a day time nap.   Expose your eyes to bright light until 8 PM

## 2017-11-19 PROBLEM — T45.1X5A CHEMOTHERAPY-INDUCED THROMBOCYTOPENIA: Status: RESOLVED | Noted: 2017-01-31 | Resolved: 2017-11-19

## 2017-11-19 PROBLEM — D69.59 CHEMOTHERAPY-INDUCED THROMBOCYTOPENIA: Status: RESOLVED | Noted: 2017-01-31 | Resolved: 2017-11-19

## 2017-12-01 ENCOUNTER — HOSPITAL ENCOUNTER (OUTPATIENT)
Dept: INFUSION THERAPY | Age: 62
Discharge: HOME | End: 2017-12-31
Attending: INTERNAL MEDICINE | Admitting: INTERNAL MEDICINE

## 2017-12-05 ENCOUNTER — TELEPHONE (OUTPATIENT)
Dept: SURGERY | Age: 62
End: 2017-12-05

## 2017-12-05 NOTE — TELEPHONE ENCOUNTER
Pt had stents put in in March she now thinks stent in right leg is blocked again its hot to touch swollen bad and can barely walk I made an appt for pt for Friday 12/8 but she still wants a phone call as well

## 2017-12-06 ENCOUNTER — PROCEDURE VISIT (OUTPATIENT)
Dept: SURGERY | Age: 62
End: 2017-12-06

## 2017-12-06 DIAGNOSIS — I77.1 RIGHT ILIAC ARTERY STENOSIS (HCC): Primary | ICD-10-CM

## 2017-12-06 DIAGNOSIS — I73.9 RIGHT LEG CLAUDICATION (HCC): ICD-10-CM

## 2017-12-06 PROCEDURE — 93926 LOWER EXTREMITY STUDY: CPT | Performed by: SURGERY

## 2017-12-06 RX ORDER — CIPROFLOXACIN 2 MG/ML
400 INJECTION, SOLUTION INTRAVENOUS
Status: CANCELLED | OUTPATIENT
Start: 2017-12-06 | End: 2017-12-06

## 2017-12-06 RX ORDER — SODIUM CHLORIDE 9 MG/ML
INJECTION, SOLUTION INTRAVENOUS CONTINUOUS
Status: CANCELLED | OUTPATIENT
Start: 2017-12-06

## 2017-12-07 ENCOUNTER — HOSPITAL ENCOUNTER (OUTPATIENT)
Dept: INTERVENTIONAL RADIOLOGY/VASCULAR | Age: 62
Discharge: HOME | End: 2017-12-07
Attending: INTERNAL MEDICINE | Admitting: INTERNAL MEDICINE

## 2017-12-07 VITALS
RESPIRATION RATE: 18 BRPM | HEIGHT: 65 IN | TEMPERATURE: 97.2 F | HEART RATE: 80 BPM | WEIGHT: 146.16 LBS | SYSTOLIC BLOOD PRESSURE: 124 MMHG | BODY MASS INDEX: 24.35 KG/M2 | DIASTOLIC BLOOD PRESSURE: 67 MMHG | OXYGEN SATURATION: 97 %

## 2017-12-07 DIAGNOSIS — C80.1 CANCER (HCC): ICD-10-CM

## 2017-12-07 DIAGNOSIS — C78.7 SECONDARY MALIGNANT NEOPLASM OF LIVER AND INTRAHEPATIC BILE DUCT (HCC): ICD-10-CM

## 2017-12-07 LAB
INR BLD: 1.34 (ref 0.85–1.15)
PLATELET # BLD: 95 K/UL (ref 135–450)
PROTHROMBIN TIME: 15.1 SEC (ref 9.6–13)

## 2017-12-07 RX ORDER — IODIXANOL 320 MG/ML
100 INJECTION, SOLUTION INTRAVASCULAR
Status: COMPLETED | OUTPATIENT
Start: 2017-12-07 | End: 2017-12-07

## 2017-12-07 RX ORDER — MIDAZOLAM HYDROCHLORIDE 1 MG/ML
INJECTION INTRAMUSCULAR; INTRAVENOUS DAILY PRN
Status: COMPLETED | OUTPATIENT
Start: 2017-12-07 | End: 2017-12-07

## 2017-12-07 RX ORDER — CIPROFLOXACIN 2 MG/ML
400 INJECTION, SOLUTION INTRAVENOUS
Status: COMPLETED | OUTPATIENT
Start: 2017-12-07 | End: 2017-12-07

## 2017-12-07 RX ORDER — SODIUM CHLORIDE 9 MG/ML
INJECTION, SOLUTION INTRAVENOUS CONTINUOUS
Status: DISCONTINUED | OUTPATIENT
Start: 2017-12-07 | End: 2017-12-08 | Stop reason: HOSPADM

## 2017-12-07 RX ORDER — FENTANYL CITRATE 50 UG/ML
INJECTION, SOLUTION INTRAMUSCULAR; INTRAVENOUS DAILY PRN
Status: COMPLETED | OUTPATIENT
Start: 2017-12-07 | End: 2017-12-07

## 2017-12-07 RX ORDER — CIPROFLOXACIN 2 MG/ML
400 INJECTION, SOLUTION INTRAVENOUS
Status: DISCONTINUED | OUTPATIENT
Start: 2017-12-07 | End: 2017-12-07 | Stop reason: CLARIF

## 2017-12-07 RX ADMIN — FENTANYL CITRATE 50 MCG: 50 INJECTION, SOLUTION INTRAMUSCULAR; INTRAVENOUS at 12:52

## 2017-12-07 RX ADMIN — FENTANYL CITRATE 50 MCG: 50 INJECTION, SOLUTION INTRAMUSCULAR; INTRAVENOUS at 12:43

## 2017-12-07 RX ADMIN — MIDAZOLAM HYDROCHLORIDE 1 MG: 1 INJECTION INTRAMUSCULAR; INTRAVENOUS at 12:50

## 2017-12-07 RX ADMIN — CIPROFLOXACIN 400 MG: 2 INJECTION, SOLUTION INTRAVENOUS at 11:11

## 2017-12-07 RX ADMIN — IODIXANOL 10 ML: 320 INJECTION, SOLUTION INTRAVASCULAR at 12:50

## 2017-12-07 RX ADMIN — SODIUM CHLORIDE: 9 INJECTION, SOLUTION INTRAVENOUS at 11:00

## 2017-12-07 RX ADMIN — MIDAZOLAM HYDROCHLORIDE 1 MG: 1 INJECTION INTRAMUSCULAR; INTRAVENOUS at 12:43

## 2017-12-07 ASSESSMENT — PAIN SCALES - GENERAL
PAINLEVEL_OUTOF10: 0
PAINLEVEL_OUTOF10: 0

## 2017-12-07 ASSESSMENT — PAIN - FUNCTIONAL ASSESSMENT: PAIN_FUNCTIONAL_ASSESSMENT: 0-10

## 2017-12-07 NOTE — PROGRESS NOTES
1.  Patient/Caregiver identifies-states name and date of birth  2. The patient is free from signs & symptoms of chemical, electrical, laser, radiation, positioning or transfer/transport injury. 3.  The patient receives appropriate medication(s) safely administered during the Perioperative period. 4.  The patient has wound/tissue perfusion consistent with or improved from baseline levels established preoperatively  5. The patient is at or returning to normothermia at the conclusion of the immediate postoperative period. 6.  The patient's fluid, electrolyte, and acid base balances are consistent with or improved from baseline levels established preoperatively  7. The patient's pulmonary function is consistent with or improved from baseline levels established preoperatively  8. The patient's cardiovascular status is consistent with or improved from baseline levels established preoperatively  9. The patient/caregiver demonstrates knowledge of nutritional management related to the operative or other invasive procedure. 10. The patient/caregiver demonstrates knowledge of medication, pain, and wound management. 11. The patient participates in the rehabilitation process as applicable  12. The patient/caregiver participates in decisions affecting his or her Perioperative plan of care  13. The patient's care is consistent with the individualized Perioperative plan of care  14. The patient's right to privacy is maintained. 15. The patient is the recipient of competent and ethical care within legal standards of practice. 16. The patient's value system, lifestyle, ethnicity, and culture are considered, respected, and incorporated in the Perioperative plan of care. Verbalizes understanding of special services available. 17.  The patient demonstrated and/or reports adequate pain control throughout the Perioperative period. 18.   The patient's neurological status is consistent with or improved from baseline levels established preoperatively. 23.  The patient/caregiver demonstrates knowledge of the expected responses to the operative or invasive procedure. 20.  Patient/Caregiver has reduced anxiety. Interventions-Familiarize with environment and equipment.   21.  Other:  22.  Other:

## 2017-12-07 NOTE — PROGRESS NOTES
Right chest port accessed per policy. Good blood return, flushes easily. dsg per policy. milad well.   Electronically signed by Norma Mario RN on 12/7/2017 at 11:12 AM

## 2017-12-07 NOTE — PROGRESS NOTES
Right nephrostomy bag emptied for 100cclight yellow urine and left nephrostomy bag emptied for 150cc light red urine. Discharge instructions reviewed. Patient getting dressed.

## 2017-12-08 ENCOUNTER — OFFICE VISIT (OUTPATIENT)
Dept: SURGERY | Age: 62
End: 2017-12-08

## 2017-12-08 VITALS
WEIGHT: 150 LBS | SYSTOLIC BLOOD PRESSURE: 108 MMHG | HEIGHT: 65 IN | BODY MASS INDEX: 24.99 KG/M2 | HEART RATE: 102 BPM | DIASTOLIC BLOOD PRESSURE: 66 MMHG

## 2017-12-08 DIAGNOSIS — I73.9 PVD (PERIPHERAL VASCULAR DISEASE) (HCC): ICD-10-CM

## 2017-12-08 DIAGNOSIS — I70.223 ATHEROSCLEROSIS OF NATIVE ARTERIES OF EXTREMITIES WITH REST PAIN, BILATERAL LEGS (HCC): Primary | ICD-10-CM

## 2017-12-08 DIAGNOSIS — I10 ESSENTIAL HYPERTENSION: ICD-10-CM

## 2017-12-08 DIAGNOSIS — M79.89 LEG SWELLING: ICD-10-CM

## 2017-12-08 PROCEDURE — 3014F SCREEN MAMMO DOC REV: CPT | Performed by: SURGERY

## 2017-12-08 PROCEDURE — 99214 OFFICE O/P EST MOD 30 MIN: CPT | Performed by: SURGERY

## 2017-12-08 PROCEDURE — 4004F PT TOBACCO SCREEN RCVD TLK: CPT | Performed by: SURGERY

## 2017-12-08 PROCEDURE — G8484 FLU IMMUNIZE NO ADMIN: HCPCS | Performed by: SURGERY

## 2017-12-08 PROCEDURE — 3017F COLORECTAL CA SCREEN DOC REV: CPT | Performed by: SURGERY

## 2017-12-08 PROCEDURE — G8420 CALC BMI NORM PARAMETERS: HCPCS | Performed by: SURGERY

## 2017-12-08 PROCEDURE — G8427 DOCREV CUR MEDS BY ELIG CLIN: HCPCS | Performed by: SURGERY

## 2017-12-08 PROCEDURE — G8598 ASA/ANTIPLAT THER USED: HCPCS | Performed by: SURGERY

## 2017-12-08 RX ORDER — ATORVASTATIN CALCIUM 20 MG/1
20 TABLET, FILM COATED ORAL DAILY
Qty: 30 TABLET | Refills: 5 | Status: SHIPPED | OUTPATIENT
Start: 2017-12-08 | End: 2018-05-15 | Stop reason: SDUPTHER

## 2017-12-08 ASSESSMENT — ENCOUNTER SYMPTOMS
ALLERGIC/IMMUNOLOGIC NEGATIVE: 1
RESPIRATORY NEGATIVE: 1
GASTROINTESTINAL NEGATIVE: 1

## 2018-01-01 ENCOUNTER — HOSPITAL ENCOUNTER (OUTPATIENT)
Dept: INFUSION THERAPY | Age: 63
Discharge: HOME | End: 2018-01-31
Attending: INTERNAL MEDICINE | Admitting: INTERNAL MEDICINE

## 2018-01-03 ENCOUNTER — HOSPITAL ENCOUNTER (OUTPATIENT)
Dept: CT IMAGING | Age: 63
Discharge: HOME | End: 2018-01-03
Attending: INTERNAL MEDICINE | Admitting: INTERNAL MEDICINE

## 2018-01-03 DIAGNOSIS — C78.7 SECONDARY MALIGNANT NEOPLASM OF LIVER (HCC): ICD-10-CM

## 2018-01-03 DIAGNOSIS — C18.7 MALIGNANT NEOPLASM OF SIGMOID COLON (HCC): ICD-10-CM

## 2018-01-03 DIAGNOSIS — C18.7 ADENOCARCINOMA OF SIGMOID COLON (HCC): ICD-10-CM

## 2018-01-03 DIAGNOSIS — C78.00 MALIGNANT NEOPLASM METASTATIC TO LUNG, UNSPECIFIED LATERALITY (HCC): ICD-10-CM

## 2018-01-09 ENCOUNTER — HOSPITAL ENCOUNTER (OUTPATIENT)
Dept: INFUSION THERAPY | Age: 63
Discharge: HOME | End: 2018-01-10
Admitting: INTERNAL MEDICINE

## 2018-01-09 LAB
ABO/RH: NORMAL
ANTIBODY SCREEN: NORMAL

## 2018-01-09 RX ORDER — 0.9 % SODIUM CHLORIDE 0.9 %
250 INTRAVENOUS SOLUTION INTRAVENOUS ONCE
Status: CANCELLED | OUTPATIENT
Start: 2018-01-09 | End: 2018-01-09

## 2018-01-09 RX ORDER — ALBUTEROL SULFATE 90 UG/1
2 AEROSOL, METERED RESPIRATORY (INHALATION) EVERY 6 HOURS PRN
Qty: 1 INHALER | Refills: 3 | Status: SHIPPED | OUTPATIENT
Start: 2018-01-09 | End: 2018-08-24 | Stop reason: SDUPTHER

## 2018-01-10 ENCOUNTER — HOSPITAL ENCOUNTER (OUTPATIENT)
Dept: INFUSION THERAPY | Age: 63
Discharge: HOME | End: 2018-01-11
Admitting: INTERNAL MEDICINE

## 2018-01-10 VITALS
TEMPERATURE: 98 F | SYSTOLIC BLOOD PRESSURE: 113 MMHG | HEART RATE: 81 BPM | OXYGEN SATURATION: 100 % | DIASTOLIC BLOOD PRESSURE: 71 MMHG | RESPIRATION RATE: 17 BRPM

## 2018-01-10 RX ORDER — 0.9 % SODIUM CHLORIDE 0.9 %
250 INTRAVENOUS SOLUTION INTRAVENOUS ONCE
Status: COMPLETED | OUTPATIENT
Start: 2018-01-10 | End: 2018-01-10

## 2018-01-10 RX ADMIN — Medication 250 ML: at 10:30

## 2018-01-10 NOTE — PROGRESS NOTES
Outpatient [address] Access    NAME:  Macy London OF BIRTH:  1955  MEDICAL RECORD NUMBER:  [id number]  DATE:  1/10/2018    Patient arrived to [place] [address]   [] per wheelchair   [x] ambulatory     Port Site Location:  right chest  Port Site:  Redness: No  Bruising: No   Edema: No  Pain: No     Port Site cleansed with:  Chloroprep Scrub for 30 seconds and air dried? Yes     Port Accessed with: [address] S[address]ES non coring needle using sterile technique. Blood return obtained: Yes  Flushed with 10 ml Normal Saline using push-pause method. Port flushes without resistance. Response to treatment:  Well tolerated by patient. Electronically signed by Aster Ramirez RN on 1/10/2018 at 10:25 AM    Outpatient [id number] [facility]    Blood Transfusion    NAME:  Kaley Shelton:  1955  MEDICAL RECORD NUMBER:  [id number]  DATE:  1/10/2018    Patient arrived to [place] [address]   [] per wheelchair   [x] ambulatory         Consent Obtained: Yes  Is this the patient's first Transfusion? No    Did the patient experience any adverse reactions to previous Transfusion?  No  Patient Active Problem List   Diagnosis    Adjustment reaction with prolonged depressive reaction    Asthma    Radiation proctitis    Essential hypertension    Colostomy status (HCC)    Chronic insomnia    [facility]    Proctodynia    Malignant neoplasm of sigmoid colon ([facility]    Tobacco dependence    Asymptomatic gallstones    Metastasis to liver (HCC)    Insomnia    Urinary incontinence    Vaginal discharge    History of radiation therapy    Metastatic adenocarcinoma to lung (HCC)    Chemotherapy-induced neuropathy (HCC)    Constipation due to pain medication    Rectovaginal fistula    Vesico-vaginal fistula    Cancer associated pain    Encounter for antineoplastic chemotherapy    Antineoplastic

## 2018-01-21 DIAGNOSIS — R60.0 PEDAL EDEMA: ICD-10-CM

## 2018-01-21 DIAGNOSIS — N18.30 CHRONIC KIDNEY DISEASE, STAGE III (MODERATE) (HCC): ICD-10-CM

## 2018-01-22 RX ORDER — FUROSEMIDE 20 MG/1
TABLET ORAL
Qty: 60 TABLET | Refills: 2 | Status: SHIPPED | OUTPATIENT
Start: 2018-01-22 | End: 2018-04-23 | Stop reason: SDUPTHER

## 2018-02-01 ENCOUNTER — HOSPITAL ENCOUNTER (OUTPATIENT)
Dept: INFUSION THERAPY | Age: 63
Discharge: HOME | End: 2018-02-28
Attending: INTERNAL MEDICINE | Admitting: INTERNAL MEDICINE

## 2018-02-08 RX ORDER — SODIUM CHLORIDE 9 MG/ML
INJECTION, SOLUTION INTRAVENOUS ONCE
Status: CANCELLED | OUTPATIENT
Start: 2018-02-09

## 2018-02-08 RX ORDER — CLINDAMYCIN PHOSPHATE 900 MG/50ML
900 INJECTION INTRAVENOUS
Status: CANCELLED | OUTPATIENT
Start: 2018-02-09 | End: 2018-02-09

## 2018-02-08 RX ORDER — CIPROFLOXACIN 2 MG/ML
400 INJECTION, SOLUTION INTRAVENOUS
Status: CANCELLED | OUTPATIENT
Start: 2018-02-09 | End: 2018-02-09

## 2018-02-09 ENCOUNTER — HOSPITAL ENCOUNTER (OUTPATIENT)
Dept: INTERVENTIONAL RADIOLOGY/VASCULAR | Age: 63
Discharge: HOME | End: 2018-02-09
Attending: INTERNAL MEDICINE | Admitting: INTERNAL MEDICINE

## 2018-02-09 VITALS
HEART RATE: 92 BPM | HEIGHT: 65 IN | DIASTOLIC BLOOD PRESSURE: 66 MMHG | RESPIRATION RATE: 14 BRPM | BODY MASS INDEX: 21.66 KG/M2 | SYSTOLIC BLOOD PRESSURE: 126 MMHG | TEMPERATURE: 98 F | WEIGHT: 130 LBS | OXYGEN SATURATION: 100 %

## 2018-02-09 DIAGNOSIS — N20.0 URIC ACID NEPHROLITHIASIS: ICD-10-CM

## 2018-02-09 DIAGNOSIS — C18.7 MALIGNANT NEOPLASM OF SIGMOID COLON (HCC): ICD-10-CM

## 2018-02-09 LAB
ANION GAP SERPL CALCULATED.3IONS-SCNC: 13 MMOL/L (ref 3–16)
BUN BLDV-MCNC: 57 MG/DL (ref 7–20)
CALCIUM SERPL-MCNC: 8.2 MG/DL (ref 8.3–10.6)
CHLORIDE BLD-SCNC: 103 MMOL/L (ref 99–110)
CO2: 21 MMOL/L (ref 21–32)
CREAT SERPL-MCNC: 1.8 MG/DL (ref 0.6–1.2)
GFR AFRICAN AMERICAN: 35
GFR NON-AFRICAN AMERICAN: 29
GLUCOSE BLD-MCNC: 119 MG/DL (ref 70–99)
INR BLD: 1.3 (ref 0.85–1.15)
PLATELET # BLD: 47 K/UL (ref 135–450)
POTASSIUM SERPL-SCNC: 4.3 MMOL/L (ref 3.5–5.1)
PROTHROMBIN TIME: 14.7 SEC (ref 9.6–13)
SODIUM BLD-SCNC: 137 MMOL/L (ref 136–145)

## 2018-02-09 RX ORDER — MIDAZOLAM HYDROCHLORIDE 1 MG/ML
INJECTION INTRAMUSCULAR; INTRAVENOUS DAILY PRN
Status: COMPLETED | OUTPATIENT
Start: 2018-02-09 | End: 2018-02-09

## 2018-02-09 RX ORDER — OXYCODONE HCL 20 MG/1
60 TABLET, FILM COATED, EXTENDED RELEASE ORAL EVERY 4 HOURS PRN
Status: ON HOLD | COMMUNITY
End: 2018-08-08 | Stop reason: ALTCHOICE

## 2018-02-09 RX ORDER — METHADONE HYDROCHLORIDE 40 MG/1
20 TABLET ORAL EVERY 8 HOURS PRN
Status: ON HOLD | COMMUNITY
End: 2018-08-08 | Stop reason: ALTCHOICE

## 2018-02-09 RX ORDER — FENTANYL CITRATE 50 UG/ML
INJECTION, SOLUTION INTRAMUSCULAR; INTRAVENOUS DAILY PRN
Status: COMPLETED | OUTPATIENT
Start: 2018-02-09 | End: 2018-02-09

## 2018-02-09 RX ORDER — IODIXANOL 320 MG/ML
100 INJECTION, SOLUTION INTRAVASCULAR
Status: COMPLETED | OUTPATIENT
Start: 2018-02-09 | End: 2018-02-09

## 2018-02-09 RX ORDER — FENTANYL 100 UG/H
1 PATCH TRANSDERMAL
Status: ON HOLD | COMMUNITY
End: 2018-10-05 | Stop reason: HOSPADM

## 2018-02-09 RX ORDER — SODIUM CHLORIDE 9 MG/ML
INJECTION, SOLUTION INTRAVENOUS ONCE
Status: COMPLETED | OUTPATIENT
Start: 2018-02-09 | End: 2018-02-09

## 2018-02-09 RX ORDER — CIPROFLOXACIN 2 MG/ML
400 INJECTION, SOLUTION INTRAVENOUS
Status: COMPLETED | OUTPATIENT
Start: 2018-02-09 | End: 2018-02-09

## 2018-02-09 RX ORDER — CLINDAMYCIN PHOSPHATE 900 MG/50ML
900 INJECTION INTRAVENOUS
Status: COMPLETED | OUTPATIENT
Start: 2018-02-09 | End: 2018-02-09

## 2018-02-09 RX ADMIN — IODIXANOL 10 ML: 320 INJECTION, SOLUTION INTRAVASCULAR at 11:47

## 2018-02-09 RX ADMIN — CLINDAMYCIN PHOSPHATE 900 MG: 900 INJECTION INTRAVENOUS at 11:03

## 2018-02-09 RX ADMIN — SODIUM CHLORIDE: 9 INJECTION, SOLUTION INTRAVENOUS at 09:30

## 2018-02-09 RX ADMIN — FENTANYL CITRATE 50 MCG: 50 INJECTION, SOLUTION INTRAMUSCULAR; INTRAVENOUS at 11:05

## 2018-02-09 RX ADMIN — MIDAZOLAM HYDROCHLORIDE 2 MG: 1 INJECTION INTRAMUSCULAR; INTRAVENOUS at 11:03

## 2018-02-09 RX ADMIN — CIPROFLOXACIN 400 MG: 2 INJECTION, SOLUTION INTRAVENOUS at 10:11

## 2018-02-09 ASSESSMENT — PAIN SCALES - GENERAL: PAINLEVEL_OUTOF10: 0

## 2018-02-09 NOTE — PROGRESS NOTES
Nephrostomy tube bilateral sites intact. Draining clear yellow urine. [place]ito po snack and drink well.

## 2018-02-09 NOTE — PRE SEDATION
Sedation Pre-Procedure Note    Patient Name: Tisha Rivas   YOB: 1955  Room/Bed: Room/bed info not found  Medical Record Number: [id number]  Date: 2018   Time: 10:35 AM       Indication:   Nephrostomy tube exchange    Consent: I have discussed with the patient and/or the patient representative the indication, alternatives, and the possible risks and/or complications of the planned procedure and the anesthesia methods. The patient and/or patient representative appear to understand and agree to proceed. 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 Past Surgical History:   has a past surgical history that includes colostomy (2011); Rectal surgery (2014);  section (); Colonoscopy; Cystocopy (EUS); Nephrostomy (Bilateral, 2016); Nephrostomy (Bilateral, 2016); Nephrostomy (Bilateral, 10/25/2016); Vagina surgery (2016); Nephrostomy (Bilateral, 2016); Nephrostomy (Bilateral, 2017); Leg Surgery (03/15/2017); vascular surgery; Breast biopsy (); Toe amputation (N/A, 2017); Tunneled venous port placement; Nephrostomy (Bilateral, 2017); Nephrostomy (Bilateral, 2017); and Nephrostomy (Bilateral, 2017).     Medications:   Scheduled Meds:    ciprofloxacin  400 mg Intravenous On Call to OR    clindamycin (CLEOCIN) IV  900 mg Intravenous On Call to OR     Continuous Infusions:    PRN Meds:   Home Meds:   Prior to Admission medications    Medication Sig Start Date End Date Taking? Authorizing Provider   methadone (METHADOSE) 40 MG disintegrating tablet Take 20 mg by mouth every 8 hours as needed for Pain. Yes Historical Provider, MD   fentaNYL (DURAGESIC) 100 MCG/HR Place 1 patch onto the skin every 72 hours. Yes Historical Provider, MD   oxyCODONE (OXYCONTIN) 20 MG extended release tablet Take 60 mg by mouth every 4 hours as needed for Pain.    Yes Historical Provider, MD   furosemide (LASIX) 20 MG tablet TAKE 1 TO 2 TABLETS DAILY AS NEEDED FOR LEG SWELLING. 1/22/18  Yes Whit Ortiz MD   nortriptyline (PAMELOR) 75 MG capsule TAKE 1 CAPSULE BY MOUTH NIGHTLY 12/8/17  Yes Whit Ortiz MD   atorvastatin (LIPITOR) 20 MG tablet Take 1 tablet by mouth daily 12/8/17  Yes Solo Zafar MD   tetracycline (ACHROMYCIN;SUMYCIN) 250 MG capsule Take 250 mg by mouth 4 times daily   Yes Historical Provider, MD   lisinopril (PRINIVIL;ZESTRIL) 5 MG tablet TAKE 1 TABLET EVERY DAY 9/18/17  Yes Whit Ortiz MD   mirtazapine (REMERON) 15 MG tablet TAKE 1 TABLET BY MOUTH NIGHTLY 9/18/17  Yes Whit Ortiz MD   sulfamethoxazole-trimethoprim (BACTRIM DS;SEPTRA DS) 800-160 MG per tablet TAKE 1 TABLET BY MOUTH EVERY EVENING 6/14/17  Yes Susan Ye, ANDIE   aspirin EC 81 MG EC tablet Take 1 tablet by mouth daily 3/31/17  Yes Solo Zafar MD   sennosides-docusate sodium (SENOKOT-S) 8.6-50 MG tablet Take 2 tablets by mouth daily  Patient taking differently: Take 2 tablets by mouth daily as needed  4/27/16  Yes Whit Ortiz MD   ondansetron (ZOFRAN) 8 MG tablet Take 1 tablet by mouth every 8 hours as needed for Nausea or Vomiting 1/5/16  Yes Tamanna Madrid MD   albuterol sulfate HFA (VENTOLIN HFA) 108 (90 Base) MCG/ACT inhaler Inhale 2 puffs into the lungs every 6 hours as needed

## 2018-02-13 DIAGNOSIS — F32.9 REACTIVE DEPRESSION (SITUATIONAL): ICD-10-CM

## 2018-02-13 DIAGNOSIS — R63.4 ABNORMAL WEIGHT LOSS: ICD-10-CM

## 2018-02-13 RX ORDER — MIRTAZAPINE 15 MG/1
TABLET, FILM COATED ORAL
Qty: 30 TABLET | Refills: 5 | Status: SHIPPED | OUTPATIENT
Start: 2018-02-13 | End: 2018-07-30 | Stop reason: SDUPTHER

## 2018-02-14 ENCOUNTER — OFFICE VISIT (OUTPATIENT)
Dept: ORTHOPEDIC SURGERY | Age: 63
End: 2018-02-14

## 2018-02-14 VITALS
DIASTOLIC BLOOD PRESSURE: 62 MMHG | HEIGHT: 65 IN | HEART RATE: 100 BPM | SYSTOLIC BLOOD PRESSURE: 95 MMHG | BODY MASS INDEX: 21.66 KG/M2 | WEIGHT: 130 LBS

## 2018-02-14 DIAGNOSIS — S42.291A OTHER CLOSED DISPLACED FRACTURE OF PROXIMAL END OF RIGHT HUMERUS, INITIAL ENCOUNTER: Primary | ICD-10-CM

## 2018-02-14 PROCEDURE — G8420 CALC BMI NORM PARAMETERS: HCPCS | Performed by: ORTHOPAEDIC SURGERY

## 2018-02-14 PROCEDURE — G8427 DOCREV CUR MEDS BY ELIG CLIN: HCPCS | Performed by: ORTHOPAEDIC SURGERY

## 2018-02-14 PROCEDURE — 23600 CLTX PROX HUMRL FX W/O MNPJ: CPT | Performed by: ORTHOPAEDIC SURGERY

## 2018-02-14 PROCEDURE — 99203 OFFICE O/P NEW LOW 30 MIN: CPT | Performed by: ORTHOPAEDIC SURGERY

## 2018-02-14 PROCEDURE — 3014F SCREEN MAMMO DOC REV: CPT | Performed by: ORTHOPAEDIC SURGERY

## 2018-02-14 PROCEDURE — 3017F COLORECTAL CA SCREEN DOC REV: CPT | Performed by: ORTHOPAEDIC SURGERY

## 2018-02-14 PROCEDURE — G8484 FLU IMMUNIZE NO ADMIN: HCPCS | Performed by: ORTHOPAEDIC SURGERY

## 2018-02-16 NOTE — PROGRESS NOTES
CHIEF COMPLAINT: Right shoulder pain/ minimally displaced proximal humerus fracture. DATE OF INJURY: 2018 DOT: 2018    HISTORY:  Ms. Tova Bruce is a 63 y.o.  female left handed who presents today for consultation request from Eric Dejesus MD for evaluation of a right shoulder injury. The patient reports that this injury occurred when she slipped on the ice and fell. The patient denies any other injuries. Rates pain a 0/10 VAS with rest and a 10/10 VAS moderate, sharp, aching, constant and show no change. Alleviating factors elevation, rest and sling. Movement makes the pain worse, the sling and resting makes the pain better. No numbness or tingling sensation. She is a smoker and is currently under going treatment for lung cancer.      Past Medical History:   Diagnosis Date    Acid reflux     Anemia     Arterial embolism and thrombosis of lower extremity ([address] 3/30/2017    Asthma     Asymptomatic gallstones     Bacterial infection     Benign essential hypertension     h/o-no current    Cataract     bilateral    Cervical cancer ([address]     stage IIB, unresectable; Chemo, XRT    Chemotherapy induced neutropenia ([address] 2016    Chronic insomnia     Colon carcinoma metastatic to multiple sites ([address] 2013    lung, liver    Colostomy status ([address] 10/21/2011    due to rectovaginal fistula    History of blood transfusion     Liver disease     Nephrostomy status ([address] 10/07/2016    bilateral percutaneous nephrostomies --hx of vesicovaginal fistulas    Pneumonia     Proctodynia     PVD (peripheral vascular disease) ([address]     Radiation proctitis 2011    radiation for cervical cancer    Stress incontinence     Wears glasses     reading       Past Surgical History:   Procedure Laterality Date    BREAST BIOPSY      benign     SECTION  1981    Breech    COLONOSCOPY      COLOSTOMY  fall     due to rectovaginal fistula     CYSTOSCOPY  EUS    TAKE 1 TABLET BY MOUTH NIGHTLY 30 tablet 5    methadone (METHADOSE) 40 MG disintegrating tablet Take 20 mg by mouth every 8 hours as needed for Pain.  fentaNYL (DURAGESIC) 100 MCG/HR Place 1 patch onto the skin every 72 hours.  oxyCODONE (OXYCONTIN) 20 MG extended release tablet Take 60 mg by mouth every 4 hours as needed for Pain.  furosemide (LASIX) 20 MG tablet TAKE 1 TO 2 TABLETS DAILY AS NEEDED FOR LEG SWELLING. 60 tablet 2    albuterol sulfate HFA (VENTOLIN HFA) 108 (90 Base) MCG/ACT inhaler Inhale 2 puffs into the lungs every 6 hours as needed for Wheezing 1 Inhaler 3    nortriptyline (PAMELOR) 75 MG capsule TAKE 1 CAPSULE BY MOUTH NIGHTLY 30 capsule 5    atorvastatin (LIPITOR) 20 MG tablet Take 1 tablet by mouth daily 30 tablet 5    tetracycline (ACHROMYCIN;SUMYCIN) 250 MG capsule Take 250 mg by mouth 4 times daily      lisinopril (PRINIVIL;ZESTRIL) 5 MG tablet TAKE 1 TABLET EVERY DAY 30 tablet 5    trifluridine-tipiracil (LONSURF) 20-8. 19 MG TABS tablet Take 3 tabs po bid Mon - Fri D1-5 and D8-12 of 28 day cycle 60 tablet 0    sulfamethoxazole-trimethoprim (BACTRIM DS;SEPTRA DS) 800-160 MG per tablet TAKE 1 TABLET BY MOUTH EVERY EVENING 30 tablet 12    aspirin EC 81 MG EC tablet Take 1 tablet by mouth daily 30 tablet 3    fluticasone-vilanterol (BREO ELLIPTA) 100-25 MCG/INH AEPB 1 inhalation once a day. Rinse mouth out well after each use.  (Patient taking differently: Inhale 1 puff into the lungs daily as needed ) 1 each 5    sennosides-docusate sodium (SENOKOT-S) 8.6-50 MG tablet Take 2 tablets by mouth daily (Patient taking differently: Take 2 tablets by mouth daily as needed ) 60 tablet 11    ondansetron (ZOFRAN) 8 MG tablet Take 1 tablet by mouth every 8 hours as needed for Nausea or Vomiting 30 tablet 5     Current Facility-Administered Medications on File Prior to Visit   Medication Dose Route Frequency Provider Last Rate Last Dose    sodium chloride flush 0.9 % injection

## 2018-02-17 PROBLEM — S42.201A CLOSED FRACTURE OF RIGHT PROXIMAL HUMERUS: Status: ACTIVE | Noted: 2018-02-17

## 2018-02-19 ENCOUNTER — HOSPITAL ENCOUNTER (OUTPATIENT)
Dept: INFUSION THERAPY | Age: 63
Discharge: HOME | End: 2018-02-20
Admitting: INTERNAL MEDICINE

## 2018-02-19 LAB
ABO/RH: NORMAL
ANTIBODY SCREEN: NORMAL

## 2018-02-19 RX ORDER — SODIUM CHLORIDE 0.9 % (FLUSH) 0.9 %
10 SYRINGE (ML) INJECTION PRN
Status: CANCELLED | OUTPATIENT
Start: 2018-02-19

## 2018-02-19 RX ORDER — 0.9 % SODIUM CHLORIDE 0.9 %
250 INTRAVENOUS SOLUTION INTRAVENOUS ONCE
Status: CANCELLED | OUTPATIENT
Start: 2018-02-19 | End: 2018-02-19

## 2018-02-19 NOTE — PROGRESS NOTES
[facility] Access for Labs    NAME:  Severa Deforest OF BIRTH:  1955  MEDICAL RECORD NUMBER:  [id number]  DATE:  2/19/2018    Patient arrived to [facility]   [] per wheelchair   [x] ambulatory     Patient is a colon cancer patient of Dr. Collette Bison who is on chemotherapy. HGB today was 6.2 and she will be getting 2 units of blood on Wed 2/21/18. Patient has her right arm in a sling and states she fell on the ice last week and fractured shoulder, right proximal humerus. Port Site Location:  right chest    Port Site:  Redness: No  Bruising: No   Edema: No  Pain: No     Port Site cleansed with:  Chloroprep Scrub for 30 seconds and air dried? Yes    Port Accessed with: 19 Gauge 1 inch Power Port needle using sterile technique. Blood return obtained: Yes    Labs:  Blood wasted: 10 ml  Labs drawn using a Vacutainer: Yes  Flushed with 30 ml Normal Saline using push-pause method. Port flushes without resistance. Port Deaccessed:  Yes    Response to treatment:  Well tolerated by patient.  Patient will return Wed 2/21/18 for blood transfusion    Education:    Indicates understanding    Electronically signed by Elayne Pichardo RN on 2/19/2018 at 2:29 PM

## 2018-02-20 DIAGNOSIS — I10 ESSENTIAL HYPERTENSION: ICD-10-CM

## 2018-02-20 RX ORDER — LISINOPRIL 5 MG/1
TABLET ORAL
Qty: 30 TABLET | Refills: 5 | Status: SHIPPED | OUTPATIENT
Start: 2018-02-20 | End: 2018-05-10 | Stop reason: ALTCHOICE

## 2018-02-21 ENCOUNTER — HOSPITAL ENCOUNTER (OUTPATIENT)
Dept: INFUSION THERAPY | Age: 63
Discharge: HOME | End: 2018-02-22
Admitting: INTERNAL MEDICINE

## 2018-02-21 VITALS
SYSTOLIC BLOOD PRESSURE: 113 MMHG | HEART RATE: 91 BPM | RESPIRATION RATE: 17 BRPM | DIASTOLIC BLOOD PRESSURE: 70 MMHG | TEMPERATURE: 98 F

## 2018-02-21 RX ORDER — SODIUM CHLORIDE 0.9 % (FLUSH) 0.9 %
10 SYRINGE (ML) INJECTION PRN
Status: DISPENSED | OUTPATIENT
Start: 2018-02-21 | End: 2018-02-22

## 2018-02-21 RX ORDER — 0.9 % SODIUM CHLORIDE 0.9 %
250 INTRAVENOUS SOLUTION INTRAVENOUS ONCE
Status: DISCONTINUED | OUTPATIENT
Start: 2018-02-21 | End: 2018-09-28 | Stop reason: HOSPADM

## 2018-02-21 RX ORDER — 0.9 % SODIUM CHLORIDE 0.9 %
250 INTRAVENOUS SOLUTION INTRAVENOUS ONCE
Status: COMPLETED | OUTPATIENT
Start: 2018-02-21 | End: 2018-02-22

## 2018-02-21 RX ORDER — 0.9 % SODIUM CHLORIDE 0.9 %
250 INTRAVENOUS SOLUTION INTRAVENOUS ONCE
Status: COMPLETED | OUTPATIENT
Start: 2018-02-21 | End: 2018-02-21

## 2018-02-21 RX ADMIN — Medication 10 ML: at 10:22

## 2018-02-21 RX ADMIN — Medication 250 ML: at 10:22

## 2018-02-21 RX ADMIN — Medication 250 ML: at 13:12

## 2018-02-21 NOTE — PROGRESS NOTES
Encounter for antineoplastic chemotherapy    Antineoplastic chemotherapy induced pancytopenia (HCC)    Nephrostomy status (HCC)    Chronic kidney disease, stage III (moderate)    Protein calorie malnutrition (HCC)    Anemia, iron deficiency    Right leg [facility]    Right iliac artery stenosis (HCC)    Closed fracture of right proximal humerus     Patient with Bone Marrow Suppression due to chemotherapy? Yes    Patient with history of GI bleeding? No  Patient with history of CHF? No  Patient with history of COPD? No    Hemoglobin/Hematocrit:    Lab Results   Component Value Date    HGB 7.2 08/08/2017    HCT 23.8 08/08/2017       Special Transfusion Products Requested: No  Blood was:  []  Irradiated    [] Washed    [] Other    Is the patient experiencing any:  Fatigue:   []   None  [x]   Increase over baseline but not altering normal activities  []   Moderate of causing difficulty performing some activities  []   Severe or loss of ability to perform some activities  []   Bedridden or disabling    Dizziness or Lightheadedness:   [x]   None  []   No Interference  []   Interferes with functioning but not activities of daily living  []   Interferes with daily activies  []   Bedridden or disabling    Shortness of Breath:   [x]   None   []   Dyspneic on exertion   []   Dyspnea with normal activities  []   Dyspnea at rest    Breath Sounds: No increased work of breathing, Breath sounds clear to auscultation bilaterally and Good air exchange    Edema: none     Tachycardia: No    Heart Palpitations: No      Chest Pain: No    Premedicated:none ordered  [] Tylenol 325 mg oral  [] Tylenol 650 mg oral    [] Benadryl 25 mg oral   [] Benadryl 50 mg oral  [] Other    Administered Blood via: [] Peripheral access    [] PICC access    [x] Port access    Numbers of units transfused 2 over 4 hours    Monitoring of vital signs and rate changes are documented in flow sheets.        Response to treatment:  Well tolerated by

## 2018-03-01 ENCOUNTER — HOSPITAL ENCOUNTER (OUTPATIENT)
Dept: INFUSION THERAPY | Age: 63
Discharge: HOME | End: 2018-03-31
Attending: INTERNAL MEDICINE | Admitting: INTERNAL MEDICINE

## 2018-04-03 RX ORDER — 0.9 % SODIUM CHLORIDE 0.9 %
250 INTRAVENOUS SOLUTION INTRAVENOUS ONCE
Status: CANCELLED | OUTPATIENT
Start: 2018-04-03 | End: 2018-04-03

## 2018-04-04 ENCOUNTER — HOSPITAL ENCOUNTER (OUTPATIENT)
Dept: INFUSION THERAPY | Age: 63
Discharge: HOME | End: 2018-04-05
Admitting: INTERNAL MEDICINE

## 2018-04-04 ENCOUNTER — OFFICE VISIT (OUTPATIENT)
Dept: ORTHOPEDIC SURGERY | Age: 63
End: 2018-04-04

## 2018-04-04 VITALS
SYSTOLIC BLOOD PRESSURE: 96 MMHG | HEART RATE: 92 BPM | BODY MASS INDEX: 21.66 KG/M2 | DIASTOLIC BLOOD PRESSURE: 57 MMHG | WEIGHT: 130 LBS | RESPIRATION RATE: 16 BRPM | HEIGHT: 65 IN

## 2018-04-04 DIAGNOSIS — S42.291D OTHER CLOSED DISPLACED FRACTURE OF PROXIMAL END OF RIGHT HUMERUS WITH ROUTINE HEALING, SUBSEQUENT ENCOUNTER: Primary | ICD-10-CM

## 2018-04-04 LAB
ABO/RH: NORMAL
ANTIBODY SCREEN: NORMAL

## 2018-04-04 PROCEDURE — APPNB15 APP NON BILLABLE TIME 0-15 MINS: Performed by: NURSE PRACTITIONER

## 2018-04-04 PROCEDURE — 99024 POSTOP FOLLOW-UP VISIT: CPT | Performed by: NURSE PRACTITIONER

## 2018-04-04 NOTE — PROGRESS NOTES
[facility] Access for Labs    NAME:  Jose Martinez OF BIRTH:  1955  MEDICAL RECORD NUMBER:  [id number]  DATE:  4/4/2018    Patient arrived to [facility]   [] per wheelchair   [x] ambulatory     Port Site Location:  right chest    Port Site:  Redness: No  Bruising: No   Edema: No  Pain: No     Port Site cleansed with:  Chloroprep Scrub for 30 seconds and air dried? Yes    Port Accessed with: 19 Gauge 1 inch Power Port needle using sterile technique. Blood return obtained: Yes    Labs: Type and screen for 2 units of PRBC  Blood wasted: 20 ml  Labs drawn using a Vacutainer/Syringe: Yes  Flushed with 20 ml Normal Saline using push-pause method. Port flushes without resistance. Port Deaccessed:  Yes    Response to treatment:  Well tolerated by patient.     Education:    Verbalized understanding    Electronically signed by Raad Benson RN on 4/4/2018 at 3:15 PM

## 2018-04-05 ENCOUNTER — HOSPITAL ENCOUNTER (OUTPATIENT)
Dept: INFUSION THERAPY | Age: 63
Discharge: HOME | End: 2018-04-30
Attending: INTERNAL MEDICINE | Admitting: INTERNAL MEDICINE

## 2018-04-05 VITALS
OXYGEN SATURATION: 98 % | DIASTOLIC BLOOD PRESSURE: 73 MMHG | TEMPERATURE: 97.5 F | RESPIRATION RATE: 17 BRPM | HEART RATE: 90 BPM | SYSTOLIC BLOOD PRESSURE: 108 MMHG

## 2018-04-05 VITALS
TEMPERATURE: 97.5 F | HEART RATE: 88 BPM | OXYGEN SATURATION: 98 % | SYSTOLIC BLOOD PRESSURE: 81 MMHG | DIASTOLIC BLOOD PRESSURE: 47 MMHG | RESPIRATION RATE: 17 BRPM

## 2018-04-05 RX ORDER — SODIUM CHLORIDE 0.9 % (FLUSH) 0.9 %
10 SYRINGE (ML) INJECTION PRN
Status: DISCONTINUED | OUTPATIENT
Start: 2018-04-05 | End: 2018-05-01 | Stop reason: HOSPADM

## 2018-04-05 RX ORDER — 0.9 % SODIUM CHLORIDE 0.9 %
250 INTRAVENOUS SOLUTION INTRAVENOUS ONCE
Status: COMPLETED | OUTPATIENT
Start: 2018-04-05 | End: 2018-04-05

## 2018-04-05 RX ADMIN — Medication 10 ML: at 15:00

## 2018-04-05 RX ADMIN — Medication 250 ML: at 12:37

## 2018-04-05 RX ADMIN — Medication 250 ML: at 09:35

## 2018-04-05 RX ADMIN — Medication 30 ML: at 15:26

## 2018-04-05 RX ADMIN — Medication 10 ML: at 13:04

## 2018-04-05 RX ADMIN — Medication 30 ML: at 09:35

## 2018-04-05 RX ADMIN — Medication 10 ML: at 10:16

## 2018-04-05 RX ADMIN — Medication 10 ML: at 12:37

## 2018-04-05 RX ADMIN — Medication 10 ML: at 12:22

## 2018-04-05 ASSESSMENT — PAIN SCALES - GENERAL
PAINLEVEL_OUTOF10: 0

## 2018-04-09 RX ORDER — CLINDAMYCIN PHOSPHATE 900 MG/50ML
900 INJECTION INTRAVENOUS
Status: CANCELLED | OUTPATIENT
Start: 2018-04-10 | End: 2018-04-10

## 2018-04-09 RX ORDER — SODIUM CHLORIDE 9 MG/ML
INJECTION, SOLUTION INTRAVENOUS ONCE
Status: CANCELLED | OUTPATIENT
Start: 2018-04-10

## 2018-04-09 RX ORDER — CIPROFLOXACIN 2 MG/ML
400 INJECTION, SOLUTION INTRAVENOUS
Status: CANCELLED | OUTPATIENT
Start: 2018-04-10 | End: 2018-04-10

## 2018-04-10 ENCOUNTER — HOSPITAL ENCOUNTER (OUTPATIENT)
Dept: INTERVENTIONAL RADIOLOGY/VASCULAR | Age: 63
Discharge: HOME | End: 2018-04-10
Attending: INTERNAL MEDICINE | Admitting: INTERNAL MEDICINE

## 2018-04-10 VITALS
RESPIRATION RATE: 14 BRPM | HEIGHT: 65 IN | HEART RATE: 80 BPM | BODY MASS INDEX: 21.66 KG/M2 | SYSTOLIC BLOOD PRESSURE: 110 MMHG | TEMPERATURE: 98 F | OXYGEN SATURATION: 98 % | DIASTOLIC BLOOD PRESSURE: 60 MMHG | WEIGHT: 130 LBS

## 2018-04-10 DIAGNOSIS — C18.7 MALIGNANT NEOPLASM OF SIGMOID COLON (HCC): ICD-10-CM

## 2018-04-10 LAB
INR BLD: 1.29 (ref 0.85–1.15)
PLATELET # BLD: 110 K/UL (ref 135–450)
PROTHROMBIN TIME: 14.6 SEC (ref 9.6–13)

## 2018-04-10 RX ORDER — ONDANSETRON 2 MG/ML
4 INJECTION INTRAMUSCULAR; INTRAVENOUS EVERY 8 HOURS PRN
Status: DISCONTINUED | OUTPATIENT
Start: 2018-04-10 | End: 2018-04-11 | Stop reason: HOSPADM

## 2018-04-10 RX ORDER — FENTANYL CITRATE 50 UG/ML
INJECTION, SOLUTION INTRAMUSCULAR; INTRAVENOUS DAILY PRN
Status: COMPLETED | OUTPATIENT
Start: 2018-04-10 | End: 2018-04-10

## 2018-04-10 RX ORDER — CIPROFLOXACIN 2 MG/ML
400 INJECTION, SOLUTION INTRAVENOUS
Status: COMPLETED | OUTPATIENT
Start: 2018-04-10 | End: 2018-04-10

## 2018-04-10 RX ORDER — IODIXANOL 320 MG/ML
100 INJECTION, SOLUTION INTRAVASCULAR
Status: COMPLETED | OUTPATIENT
Start: 2018-04-10 | End: 2018-04-10

## 2018-04-10 RX ORDER — MIDAZOLAM HYDROCHLORIDE 1 MG/ML
INJECTION INTRAMUSCULAR; INTRAVENOUS DAILY PRN
Status: COMPLETED | OUTPATIENT
Start: 2018-04-10 | End: 2018-04-10

## 2018-04-10 RX ORDER — SODIUM CHLORIDE 9 MG/ML
INJECTION, SOLUTION INTRAVENOUS ONCE
Status: COMPLETED | OUTPATIENT
Start: 2018-04-10 | End: 2018-04-10

## 2018-04-10 RX ORDER — CLINDAMYCIN PHOSPHATE 900 MG/50ML
900 INJECTION INTRAVENOUS
Status: COMPLETED | OUTPATIENT
Start: 2018-04-10 | End: 2018-04-10

## 2018-04-10 RX ADMIN — FENTANYL CITRATE 50 MCG: 50 INJECTION, SOLUTION INTRAMUSCULAR; INTRAVENOUS at 10:37

## 2018-04-10 RX ADMIN — CLINDAMYCIN PHOSPHATE 900 MG: 900 INJECTION INTRAVENOUS at 10:23

## 2018-04-10 RX ADMIN — CIPROFLOXACIN 400 MG: 2 INJECTION, SOLUTION INTRAVENOUS at 09:36

## 2018-04-10 RX ADMIN — SODIUM CHLORIDE: 9 INJECTION, SOLUTION INTRAVENOUS at 09:36

## 2018-04-10 RX ADMIN — IODIXANOL 5 ML: 320 INJECTION, SOLUTION INTRAVASCULAR at 10:45

## 2018-04-10 RX ADMIN — MIDAZOLAM HYDROCHLORIDE 1 MG: 1 INJECTION INTRAMUSCULAR; INTRAVENOUS at 10:37

## 2018-04-10 ASSESSMENT — PAIN SCALES - GENERAL
PAINLEVEL_OUTOF10: 0
PAINLEVEL_OUTOF10: 0

## 2018-04-10 ASSESSMENT — PAIN DESCRIPTION - DESCRIPTORS: DESCRIPTORS: ACHING

## 2018-04-10 ASSESSMENT — PAIN - FUNCTIONAL ASSESSMENT: PAIN_FUNCTIONAL_ASSESSMENT: 0-10

## 2018-04-23 DIAGNOSIS — R60.0 PEDAL EDEMA: ICD-10-CM

## 2018-04-23 DIAGNOSIS — N18.30 CHRONIC KIDNEY DISEASE, STAGE III (MODERATE) (HCC): ICD-10-CM

## 2018-04-23 RX ORDER — FUROSEMIDE 20 MG/1
TABLET ORAL
Qty: 180 TABLET | Refills: 0 | Status: SHIPPED | OUTPATIENT
Start: 2018-04-23 | End: 2018-10-16 | Stop reason: SDUPTHER

## 2018-04-24 ENCOUNTER — TELEPHONE (OUTPATIENT)
Dept: RHEUMATOLOGY | Age: 63
End: 2018-04-24

## 2018-04-24 DIAGNOSIS — T45.1X5A CHEMOTHERAPY-INDUCED NEUROPATHY (HCC): ICD-10-CM

## 2018-04-24 DIAGNOSIS — K62.89 PROCTODYNIA: ICD-10-CM

## 2018-04-24 DIAGNOSIS — G62.0 CHEMOTHERAPY-INDUCED NEUROPATHY (HCC): ICD-10-CM

## 2018-04-24 RX ORDER — NORTRIPTYLINE HYDROCHLORIDE 75 MG/1
CAPSULE ORAL
Qty: 90 CAPSULE | Refills: 1 | Status: SHIPPED | OUTPATIENT
Start: 2018-04-24 | End: 2018-11-02 | Stop reason: SDUPTHER

## 2018-05-01 ENCOUNTER — HOSPITAL ENCOUNTER (OUTPATIENT)
Dept: INFUSION THERAPY | Age: 63
Discharge: HOME | End: 2018-05-31
Attending: INTERNAL MEDICINE | Admitting: INTERNAL MEDICINE

## 2018-05-09 ENCOUNTER — HOSPITAL ENCOUNTER (OUTPATIENT)
Dept: CT IMAGING | Age: 63
Discharge: HOME | End: 2018-05-09
Attending: INTERNAL MEDICINE | Admitting: INTERNAL MEDICINE

## 2018-05-09 DIAGNOSIS — C78.7 SECONDARY MALIGNANT NEOPLASM OF LIVER (HCC): ICD-10-CM

## 2018-05-09 DIAGNOSIS — C78.00 MALIGNANT NEOPLASM METASTATIC TO LUNG, UNSPECIFIED LATERALITY (HCC): ICD-10-CM

## 2018-05-09 DIAGNOSIS — C18.7 ADENOCARCINOMA OF SIGMOID COLON (HCC): ICD-10-CM

## 2018-05-09 DIAGNOSIS — C18.7 MALIGNANT NEOPLASM OF SIGMOID COLON (HCC): ICD-10-CM

## 2018-05-10 ENCOUNTER — OFFICE VISIT (OUTPATIENT)
Dept: INTERNAL MEDICINE CLINIC | Age: 63
End: 2018-05-10

## 2018-05-10 VITALS
WEIGHT: 150.2 LBS | SYSTOLIC BLOOD PRESSURE: 100 MMHG | BODY MASS INDEX: 24.99 KG/M2 | RESPIRATION RATE: 14 BRPM | OXYGEN SATURATION: 98 % | HEART RATE: 103 BPM | DIASTOLIC BLOOD PRESSURE: 60 MMHG

## 2018-05-10 DIAGNOSIS — T45.1X5A CHEMOTHERAPY-INDUCED NEUROPATHY (HCC): ICD-10-CM

## 2018-05-10 DIAGNOSIS — I73.9 RIGHT LEG CLAUDICATION (HCC): ICD-10-CM

## 2018-05-10 DIAGNOSIS — Z93.3 COLOSTOMY STATUS (HCC): ICD-10-CM

## 2018-05-10 DIAGNOSIS — C18.7 MALIGNANT NEOPLASM OF SIGMOID COLON (HCC): Primary | ICD-10-CM

## 2018-05-10 DIAGNOSIS — Z86.79 HISTORY OF HYPERTENSION: ICD-10-CM

## 2018-05-10 DIAGNOSIS — I10 ESSENTIAL HYPERTENSION: ICD-10-CM

## 2018-05-10 DIAGNOSIS — Z93.6 NEPHROSTOMY STATUS (HCC): ICD-10-CM

## 2018-05-10 DIAGNOSIS — G62.0 CHEMOTHERAPY-INDUCED NEUROPATHY (HCC): ICD-10-CM

## 2018-05-10 DIAGNOSIS — I77.1 RIGHT ILIAC ARTERY STENOSIS (HCC): ICD-10-CM

## 2018-05-10 DIAGNOSIS — J45.909 UNCOMPLICATED ASTHMA, UNSPECIFIED ASTHMA SEVERITY, UNSPECIFIED WHETHER PERSISTENT: ICD-10-CM

## 2018-05-10 DIAGNOSIS — E44.1 MILD PROTEIN-CALORIE MALNUTRITION (HCC): ICD-10-CM

## 2018-05-10 DIAGNOSIS — A49.9 BACTERIAL INFECTION: ICD-10-CM

## 2018-05-10 PROCEDURE — 3017F COLORECTAL CA SCREEN DOC REV: CPT | Performed by: FAMILY MEDICINE

## 2018-05-10 PROCEDURE — 4004F PT TOBACCO SCREEN RCVD TLK: CPT | Performed by: FAMILY MEDICINE

## 2018-05-10 PROCEDURE — 99214 OFFICE O/P EST MOD 30 MIN: CPT | Performed by: FAMILY MEDICINE

## 2018-05-10 PROCEDURE — G8427 DOCREV CUR MEDS BY ELIG CLIN: HCPCS | Performed by: FAMILY MEDICINE

## 2018-05-10 PROCEDURE — G8420 CALC BMI NORM PARAMETERS: HCPCS | Performed by: FAMILY MEDICINE

## 2018-05-10 RX ORDER — LISINOPRIL 5 MG/1
TABLET ORAL
Qty: 30 TABLET | Refills: 5 | Status: CANCELLED | OUTPATIENT
Start: 2018-05-10

## 2018-05-10 RX ORDER — TETRACYCLINE HYDROCHLORIDE 250 MG/1
250 CAPSULE ORAL 4 TIMES DAILY
Qty: 120 CAPSULE | Refills: 3 | Status: CANCELLED | OUTPATIENT
Start: 2018-05-10

## 2018-05-10 ASSESSMENT — PATIENT HEALTH QUESTIONNAIRE - PHQ9
SUM OF ALL RESPONSES TO PHQ9 QUESTIONS 1 & 2: 0
SUM OF ALL RESPONSES TO PHQ QUESTIONS 1-9: 0
2. FEELING DOWN, DEPRESSED OR HOPELESS: 0
1. LITTLE INTEREST OR PLEASURE IN DOING THINGS: 0

## 2018-05-13 PROBLEM — Z86.79 HISTORY OF HYPERTENSION: Status: ACTIVE | Noted: 2018-05-13

## 2018-05-13 ASSESSMENT — ENCOUNTER SYMPTOMS
ABDOMINAL PAIN: 1
SHORTNESS OF BREATH: 0
WHEEZING: 0
COUGH: 0
CHEST TIGHTNESS: 0

## 2018-05-15 ENCOUNTER — TELEPHONE (OUTPATIENT)
Dept: INTERNAL MEDICINE CLINIC | Age: 63
End: 2018-05-15

## 2018-05-15 RX ORDER — ATORVASTATIN CALCIUM 20 MG/1
20 TABLET, FILM COATED ORAL DAILY
Qty: 30 TABLET | Refills: 5 | Status: SHIPPED | OUTPATIENT
Start: 2018-05-15 | End: 2018-11-17 | Stop reason: SDUPTHER

## 2018-05-29 RX ORDER — CIPROFLOXACIN 2 MG/ML
400 INJECTION, SOLUTION INTRAVENOUS ONCE
Status: CANCELLED | OUTPATIENT
Start: 2018-05-30

## 2018-05-29 RX ORDER — SODIUM CHLORIDE 9 MG/ML
INJECTION, SOLUTION INTRAVENOUS ONCE
Status: CANCELLED | OUTPATIENT
Start: 2018-05-30

## 2018-05-30 ENCOUNTER — HOSPITAL ENCOUNTER (OUTPATIENT)
Dept: INTERVENTIONAL RADIOLOGY/VASCULAR | Age: 63
Discharge: HOME | End: 2018-05-30
Attending: INTERNAL MEDICINE | Admitting: INTERNAL MEDICINE

## 2018-05-30 VITALS
RESPIRATION RATE: 16 BRPM | SYSTOLIC BLOOD PRESSURE: 117 MMHG | WEIGHT: 144.18 LBS | TEMPERATURE: 98.2 F | OXYGEN SATURATION: 100 % | DIASTOLIC BLOOD PRESSURE: 69 MMHG | HEIGHT: 65 IN | BODY MASS INDEX: 24.02 KG/M2 | HEART RATE: 82 BPM

## 2018-05-30 DIAGNOSIS — N18.30 CHRONIC KIDNEY DISEASE, STAGE III (MODERATE) (HCC): ICD-10-CM

## 2018-05-30 LAB
INR BLD: 1.33 (ref 0.85–1.15)
PLATELET # BLD: 136 K/UL (ref 135–450)
PROTHROMBIN TIME: 15 SEC (ref 9.6–13)

## 2018-05-30 RX ORDER — CIPROFLOXACIN 2 MG/ML
400 INJECTION, SOLUTION INTRAVENOUS ONCE
Status: COMPLETED | OUTPATIENT
Start: 2018-05-30 | End: 2018-05-30

## 2018-05-30 RX ORDER — IODIXANOL 320 MG/ML
100 INJECTION, SOLUTION INTRAVASCULAR
Status: COMPLETED | OUTPATIENT
Start: 2018-05-30 | End: 2018-05-30

## 2018-05-30 RX ORDER — MIDAZOLAM HYDROCHLORIDE 1 MG/ML
INJECTION INTRAMUSCULAR; INTRAVENOUS DAILY PRN
Status: COMPLETED | OUTPATIENT
Start: 2018-05-30 | End: 2018-05-30

## 2018-05-30 RX ORDER — FENTANYL CITRATE 50 UG/ML
INJECTION, SOLUTION INTRAMUSCULAR; INTRAVENOUS DAILY PRN
Status: COMPLETED | OUTPATIENT
Start: 2018-05-30 | End: 2018-05-30

## 2018-05-30 RX ORDER — SODIUM CHLORIDE 9 MG/ML
INJECTION, SOLUTION INTRAVENOUS ONCE
Status: COMPLETED | OUTPATIENT
Start: 2018-05-30 | End: 2018-05-30

## 2018-05-30 RX ADMIN — CIPROFLOXACIN 400 MG: 2 INJECTION, SOLUTION INTRAVENOUS at 08:35

## 2018-05-30 RX ADMIN — SODIUM CHLORIDE: 9 INJECTION, SOLUTION INTRAVENOUS at 08:35

## 2018-05-30 RX ADMIN — MIDAZOLAM HYDROCHLORIDE 1 MG: 1 INJECTION INTRAMUSCULAR; INTRAVENOUS at 09:14

## 2018-05-30 RX ADMIN — FENTANYL CITRATE 50 MCG: 50 INJECTION, SOLUTION INTRAMUSCULAR; INTRAVENOUS at 09:12

## 2018-05-30 RX ADMIN — FENTANYL CITRATE 50 MCG: 50 INJECTION, SOLUTION INTRAMUSCULAR; INTRAVENOUS at 09:22

## 2018-05-30 RX ADMIN — MIDAZOLAM HYDROCHLORIDE 1 MG: 1 INJECTION INTRAMUSCULAR; INTRAVENOUS at 09:12

## 2018-05-30 RX ADMIN — IODIXANOL 15 ML: 320 INJECTION, SOLUTION INTRAVASCULAR at 09:51

## 2018-05-30 ASSESSMENT — PAIN SCALES - GENERAL
PAINLEVEL_OUTOF10: 0
PAINLEVEL_OUTOF10: 0

## 2018-05-30 ASSESSMENT — PAIN - FUNCTIONAL ASSESSMENT: PAIN_FUNCTIONAL_ASSESSMENT: 0-10

## 2018-06-12 RX ORDER — SODIUM CHLORIDE 9 MG/ML
INJECTION, SOLUTION INTRAVENOUS ONCE
Status: CANCELLED | OUTPATIENT
Start: 2018-06-12

## 2018-06-12 RX ORDER — CIPROFLOXACIN 2 MG/ML
400 INJECTION, SOLUTION INTRAVENOUS ONCE
Status: CANCELLED | OUTPATIENT
Start: 2018-06-13

## 2018-06-13 ENCOUNTER — HOSPITAL ENCOUNTER (OUTPATIENT)
Dept: INTERVENTIONAL RADIOLOGY/VASCULAR | Age: 63
Discharge: HOME | End: 2018-06-13
Attending: INTERNAL MEDICINE | Admitting: INTERNAL MEDICINE

## 2018-06-13 VITALS
RESPIRATION RATE: 16 BRPM | DIASTOLIC BLOOD PRESSURE: 58 MMHG | HEART RATE: 89 BPM | HEIGHT: 65 IN | OXYGEN SATURATION: 98 % | BODY MASS INDEX: 24.06 KG/M2 | TEMPERATURE: 98 F | WEIGHT: 144.4 LBS | SYSTOLIC BLOOD PRESSURE: 145 MMHG

## 2018-06-13 DIAGNOSIS — C18.7 CANCER OF SIGMOID (HCC): ICD-10-CM

## 2018-06-13 DIAGNOSIS — C18.7 MALIGNANT NEOPLASM OF SIGMOID COLON (HCC): ICD-10-CM

## 2018-06-13 RX ORDER — FENTANYL CITRATE 50 UG/ML
INJECTION, SOLUTION INTRAMUSCULAR; INTRAVENOUS DAILY PRN
Status: COMPLETED | OUTPATIENT
Start: 2018-06-13 | End: 2018-06-13

## 2018-06-13 RX ORDER — SODIUM CHLORIDE 9 MG/ML
INJECTION, SOLUTION INTRAVENOUS ONCE
Status: COMPLETED | OUTPATIENT
Start: 2018-06-13 | End: 2018-06-13

## 2018-06-13 RX ORDER — MIDAZOLAM HYDROCHLORIDE 1 MG/ML
INJECTION INTRAMUSCULAR; INTRAVENOUS DAILY PRN
Status: COMPLETED | OUTPATIENT
Start: 2018-06-13 | End: 2018-06-13

## 2018-06-13 RX ORDER — CIPROFLOXACIN 2 MG/ML
400 INJECTION, SOLUTION INTRAVENOUS ONCE
Status: COMPLETED | OUTPATIENT
Start: 2018-06-13 | End: 2018-06-13

## 2018-06-13 RX ADMIN — CIPROFLOXACIN 400 MG: 2 INJECTION, SOLUTION INTRAVENOUS at 11:40

## 2018-06-13 RX ADMIN — MIDAZOLAM HYDROCHLORIDE 2 MG: 1 INJECTION INTRAMUSCULAR; INTRAVENOUS at 12:32

## 2018-06-13 RX ADMIN — SODIUM CHLORIDE: 9 INJECTION, SOLUTION INTRAVENOUS at 11:06

## 2018-06-13 RX ADMIN — FENTANYL CITRATE 50 MCG: 50 INJECTION, SOLUTION INTRAMUSCULAR; INTRAVENOUS at 12:32

## 2018-06-13 ASSESSMENT — PAIN SCALES - GENERAL
PAINLEVEL_OUTOF10: 0
PAINLEVEL_OUTOF10: 0

## 2018-06-13 ASSESSMENT — PAIN - FUNCTIONAL ASSESSMENT: PAIN_FUNCTIONAL_ASSESSMENT: 0-10

## 2018-06-15 ENCOUNTER — HOSPITAL ENCOUNTER (OUTPATIENT)
Dept: INTERVENTIONAL RADIOLOGY/VASCULAR | Age: 63
Discharge: HOME | End: 2018-06-15
Attending: INTERNAL MEDICINE | Admitting: INTERNAL MEDICINE

## 2018-06-15 DIAGNOSIS — C18.7 MALIGNANT NEOPLASM OF SIGMOID COLON (HCC): ICD-10-CM

## 2018-06-15 RX ORDER — IODIXANOL 320 MG/ML
100 INJECTION, SOLUTION INTRAVASCULAR
Status: COMPLETED | OUTPATIENT
Start: 2018-06-15 | End: 2018-06-15

## 2018-06-15 RX ADMIN — IODIXANOL 10 ML: 320 INJECTION, SOLUTION INTRAVASCULAR at 16:46

## 2018-06-19 ENCOUNTER — HOSPITAL ENCOUNTER (OUTPATIENT)
Dept: INFUSION THERAPY | Age: 63
Discharge: HOME | End: 2018-06-30
Admitting: INTERNAL MEDICINE

## 2018-06-19 LAB
ABO/RH: NORMAL
ANTIBODY SCREEN: NORMAL

## 2018-06-19 RX ORDER — SODIUM CHLORIDE 0.9 % (FLUSH) 0.9 %
10 SYRINGE (ML) INJECTION PRN
Status: CANCELLED | OUTPATIENT
Start: 2018-06-19

## 2018-06-19 RX ORDER — 0.9 % SODIUM CHLORIDE 0.9 %
250 INTRAVENOUS SOLUTION INTRAVENOUS ONCE
Status: CANCELLED | OUTPATIENT
Start: 2018-06-19 | End: 2018-06-19

## 2018-06-20 ENCOUNTER — HOSPITAL ENCOUNTER (OUTPATIENT)
Dept: INFUSION THERAPY | Age: 63
Discharge: HOME | End: 2018-06-21
Admitting: INTERNAL MEDICINE

## 2018-06-20 VITALS
TEMPERATURE: 97.8 F | HEART RATE: 80 BPM | RESPIRATION RATE: 17 BRPM | SYSTOLIC BLOOD PRESSURE: 111 MMHG | OXYGEN SATURATION: 99 % | DIASTOLIC BLOOD PRESSURE: 65 MMHG

## 2018-06-20 LAB
BASOPHILS ABSOLUTE: 0 K/UL (ref 0–0.2)
BASOPHILS RELATIVE PERCENT: 0.9 %
BLOOD BANK DISPENSE STATUS: NORMAL
BLOOD BANK PRODUCT CODE: NORMAL
BPU ID: NORMAL
DESCRIPTION BLOOD BANK: NORMAL
EOSINOPHILS ABSOLUTE: 0.1 K/UL (ref 0–0.6)
EOSINOPHILS RELATIVE PERCENT: 2.7 %
HCT VFR BLD CALC: 23.7 % (ref 36–48)
HEMOGLOBIN: 8.2 G/DL (ref 12–16)
LYMPHOCYTES ABSOLUTE: 0.3 K/UL (ref 1–5.1)
LYMPHOCYTES RELATIVE PERCENT: 6.8 %
MCH RBC QN AUTO: 34.5 PG (ref 26–34)
MCHC RBC AUTO-ENTMCNC: 34.6 G/DL (ref 31–36)
MCV RBC AUTO: 99.7 FL (ref 80–100)
MONOCYTES ABSOLUTE: 0.4 K/UL (ref 0–1.3)
MONOCYTES RELATIVE PERCENT: 8.7 %
NEUTROPHILS ABSOLUTE: 4 K/UL (ref 1.7–7.7)
NEUTROPHILS RELATIVE PERCENT: 80.9 %
PDW BLD-RTO: 18.3 % (ref 12.4–15.4)
PLATELET # BLD: 153 K/UL (ref 135–450)
PMV BLD AUTO: 6.9 FL (ref 5–10.5)
RBC # BLD: 2.38 M/UL (ref 4–5.2)
WBC # BLD: 5 K/UL (ref 4–11)

## 2018-06-20 RX ORDER — SODIUM CHLORIDE 0.9 % (FLUSH) 0.9 %
10 SYRINGE (ML) INJECTION PRN
Status: DISCONTINUED | OUTPATIENT
Start: 2018-06-20 | End: 2018-08-08

## 2018-06-20 RX ORDER — 0.9 % SODIUM CHLORIDE 0.9 %
250 INTRAVENOUS SOLUTION INTRAVENOUS ONCE
Status: COMPLETED | OUTPATIENT
Start: 2018-06-20 | End: 2018-06-20

## 2018-06-20 RX ADMIN — Medication 250 ML: at 13:10

## 2018-06-20 RX ADMIN — Medication 10 ML: at 12:50

## 2018-06-20 ASSESSMENT — PAIN SCALES - GENERAL
PAINLEVEL_OUTOF10: 0
PAINLEVEL_OUTOF10: 0

## 2018-06-20 NOTE — PROGRESS NOTES
Outpatient [id number] [facility]    Blood Transfusion    NAME:  Swapna Elena OF BIRTH:  1955  MEDICAL RECORD NUMBER:  [id number]  DATE:  6/20/2018     Patient arrived to [facility]   [] per wheelchair   [x] ambulatory         Consent Obtained: Yes  Is this the patient's first Transfusion? No    Did the patient experience any adverse reactions to previous Transfusion? No  Patient Active Problem List   Diagnosis    Adjustment reaction with prolonged depressive reaction    Asthma    Radiation proctitis    Essential hypertension    Colostomy status (HCC)    Chronic insomnia    Cervical cancer ([address]    Proctodynia    Malignant neoplasm of sigmoid colon ([address]    Tobacco dependence    Asymptomatic gallstones    Metastasis to liver (HCC)    Insomnia    Urinary incontinence    Vaginal discharge    History of radiation therapy    Metastatic adenocarcinoma to lung (HCC)    Chemotherapy-induced neuropathy (HCC)    Constipation due to pain medication    Rectovaginal fistula    Vesico-vaginal fistula    Cancer associated pain    Antineoplastic chemotherapy induced pancytopenia (HCC)    Nephrostomy status (HCC)    Chronic kidney disease, stage III (moderate)    Mild protein-calorie malnutrition (HCC)    Anemia, iron deficiency    Right leg claudication ([address]    Right iliac artery stenosis (HCC)    Closed fracture of right proximal humerus    History of hypertension resolved with weight loss     Patient with Bone Marrow Suppression due to chemotherapy? Yes on oral chemotherapy, unknown name. Patient with history of GI bleeding? No  Patient with history of CHF? No  Patient with history of COPD?  No     Special Transfusion Products Requested: No    Is the patient experiencing any:  Fatigue:   []   None  [x]   Increase over baseline but not altering normal activities  []   Moderate of causing difficulty performing some activities  []   Severe or loss of

## 2018-07-01 ENCOUNTER — HOSPITAL ENCOUNTER (OUTPATIENT)
Dept: INFUSION THERAPY | Age: 63
Discharge: HOME | End: 2018-07-31
Attending: INTERNAL MEDICINE | Admitting: INTERNAL MEDICINE

## 2018-07-13 RX ORDER — CIPROFLOXACIN 2 MG/ML
400 INJECTION, SOLUTION INTRAVENOUS
Status: CANCELLED | OUTPATIENT
Start: 2018-07-13 | End: 2018-07-13

## 2018-07-13 RX ORDER — SODIUM CHLORIDE 9 MG/ML
INJECTION, SOLUTION INTRAVENOUS CONTINUOUS
Status: CANCELLED | OUTPATIENT
Start: 2018-07-13 | End: 2019-07-13

## 2018-07-16 ENCOUNTER — HOSPITAL ENCOUNTER (OUTPATIENT)
Dept: INTERVENTIONAL RADIOLOGY/VASCULAR | Age: 63
Discharge: HOME | End: 2018-07-16
Attending: INTERNAL MEDICINE | Admitting: INTERNAL MEDICINE

## 2018-07-16 VITALS
HEART RATE: 89 BPM | OXYGEN SATURATION: 99 % | BODY MASS INDEX: 22.48 KG/M2 | HEIGHT: 65 IN | DIASTOLIC BLOOD PRESSURE: 66 MMHG | SYSTOLIC BLOOD PRESSURE: 119 MMHG | TEMPERATURE: 97 F | WEIGHT: 134.92 LBS | RESPIRATION RATE: 16 BRPM

## 2018-07-16 DIAGNOSIS — N13.39 OTHER HYDRONEPHROSIS: ICD-10-CM

## 2018-07-16 DIAGNOSIS — C18.7 MALIGNANT NEOPLASM OF SIGMOID COLON (HCC): ICD-10-CM

## 2018-07-16 LAB
INR BLD: 1.56 (ref 0.86–1.14)
PROTHROMBIN TIME: 17.8 SEC (ref 9.8–13)

## 2018-07-16 RX ORDER — CIPROFLOXACIN 2 MG/ML
400 INJECTION, SOLUTION INTRAVENOUS
Status: COMPLETED | OUTPATIENT
Start: 2018-07-16 | End: 2018-07-16

## 2018-07-16 RX ORDER — ACETAMINOPHEN 325 MG/1
650 TABLET ORAL EVERY 4 HOURS PRN
Status: DISCONTINUED | OUTPATIENT
Start: 2018-07-16 | End: 2018-07-17 | Stop reason: HOSPADM

## 2018-07-16 RX ORDER — IODIXANOL 320 MG/ML
100 INJECTION, SOLUTION INTRAVASCULAR
Status: COMPLETED | OUTPATIENT
Start: 2018-07-16 | End: 2018-07-16

## 2018-07-16 RX ORDER — MIDAZOLAM HYDROCHLORIDE 1 MG/ML
INJECTION INTRAMUSCULAR; INTRAVENOUS DAILY PRN
Status: COMPLETED | OUTPATIENT
Start: 2018-07-16 | End: 2018-07-16

## 2018-07-16 RX ORDER — SODIUM CHLORIDE 9 MG/ML
INJECTION, SOLUTION INTRAVENOUS CONTINUOUS
Status: DISCONTINUED | OUTPATIENT
Start: 2018-07-16 | End: 2018-07-17 | Stop reason: HOSPADM

## 2018-07-16 RX ORDER — FENTANYL CITRATE 50 UG/ML
INJECTION, SOLUTION INTRAMUSCULAR; INTRAVENOUS DAILY PRN
Status: COMPLETED | OUTPATIENT
Start: 2018-07-16 | End: 2018-07-16

## 2018-07-16 RX ADMIN — SODIUM CHLORIDE: 9 INJECTION, SOLUTION INTRAVENOUS at 10:33

## 2018-07-16 RX ADMIN — CIPROFLOXACIN 400 MG: 2 INJECTION, SOLUTION INTRAVENOUS at 10:33

## 2018-07-16 RX ADMIN — MIDAZOLAM HYDROCHLORIDE 1 MG: 1 INJECTION INTRAMUSCULAR; INTRAVENOUS at 11:20

## 2018-07-16 RX ADMIN — FENTANYL CITRATE 50 MCG: 50 INJECTION, SOLUTION INTRAMUSCULAR; INTRAVENOUS at 11:20

## 2018-07-16 RX ADMIN — IODIXANOL 10 ML: 320 INJECTION, SOLUTION INTRAVASCULAR at 11:42

## 2018-07-16 RX ADMIN — FENTANYL CITRATE 25 MCG: 50 INJECTION, SOLUTION INTRAMUSCULAR; INTRAVENOUS at 11:30

## 2018-07-16 RX ADMIN — MIDAZOLAM HYDROCHLORIDE 1 MG: 1 INJECTION INTRAMUSCULAR; INTRAVENOUS at 11:22

## 2018-07-16 ASSESSMENT — PAIN - FUNCTIONAL ASSESSMENT: PAIN_FUNCTIONAL_ASSESSMENT: 0-10

## 2018-07-16 ASSESSMENT — PAIN SCALES - GENERAL
PAINLEVEL_OUTOF10: 0

## 2018-07-16 ASSESSMENT — PAIN DESCRIPTION - PAIN TYPE
TYPE: SURGICAL PAIN
TYPE: SURGICAL PAIN

## 2018-07-16 NOTE — PRE SEDATION
Sedation Pre-Procedure Note    Patient Name: Shayne Thompson   YOB: 1955  Room/Bed: Room/bed info not found  Medical Record Number: [id number]  Date: 2018   Time: 11:32 AM       Indication:  Cervical cancer. Bilateral nephorostomy tube change    Consent: I have discussed with the patient and/or the patient representative the indication, alternatives, and the possible risks and/or complications of the planned procedure and the anesthesia methods. The patient and/or patient representative appear to understand and agree to proceed. Vital Signs:   Vitals:    18 1131   BP: 119/70   Pulse:    Resp:    Temp:    SpO2: 98%       Past Medical History:   has a past medical history of Acid 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 ([address] Asthma; Asymptomatic gallstones; Bacterial infection; Benign essential hypertension; Cataract; Cervical cancer ([address] Chemotherapy induced neutropenia ([address] Chronic insomnia; Colon carcinoma metastatic to multiple sites [facility]); Colostomy status ([address] History of blood transfusion; Humerus fracture; Liver disease; Nephrostomy status ([address] Pneumonia; Proctodynia; PVD (peripheral vascular disease) ([address] Radiation proctitis; Stress incontinence; and Wears glasses. Past Surgical History:   has a past surgical history that includes colostomy (fall ); Rectal surgery (2014);  section (); Colonoscopy; Cystocopy (EUS); Nephrostomy (Bilateral, 2016); Vagina surgery (2016); Leg Surgery (03/15/2017); vascular surgery; Breast biopsy (); Toe amputation (N/A, 2017); Tunneled venous port placement; Nephrostomy (Bilateral, 2018); and Nephrostomy (Bilateral, 2018).     Medications:   Scheduled Meds:    ciprofloxacin  400 mg Intravenous On Call to OR     Continuous Infusions:    sodium chloride 125 mL/hr at 18 1033     PRN Meds: fentaNYL, midazolam  Home Meds:   Prior to Admission medications

## 2018-07-16 NOTE — PROGRESS NOTES
Pt sedate. Denies pain at present. Sister present. Discharge instructions given to pt and sister. Pt dozing during instructions. Pt has had this done before. Sister verbalizes an understanding of instructions.

## 2018-07-16 NOTE — PROGRESS NOTES
Nephr tube sites without swelling or oozing. Yellow urine draining from each tube. De-accessed port with 20 cc NS flush. Up to chair. Assisted pt to dress. Sister went for car. Wheeled pt to discharge.

## 2018-07-16 NOTE — PROGRESS NOTES
To ACU room 4 by cart from [facility]. VSS. Awake, slightly drowsy. Denies complaints. Resting quietly. Aware will have frequent vitals done. Both nephrostomy sites without drainage.

## 2018-07-30 DIAGNOSIS — F32.9 REACTIVE DEPRESSION (SITUATIONAL): ICD-10-CM

## 2018-07-30 DIAGNOSIS — R63.4 ABNORMAL WEIGHT LOSS: ICD-10-CM

## 2018-07-30 RX ORDER — MIRTAZAPINE 15 MG/1
TABLET, FILM COATED ORAL
Qty: 30 TABLET | Refills: 5 | Status: SHIPPED | OUTPATIENT
Start: 2018-07-30 | End: 2019-01-01 | Stop reason: SDUPTHER

## 2018-08-08 PROBLEM — R79.89 ELEVATED LACTIC ACID LEVEL: Status: ACTIVE | Noted: 2018-08-08

## 2018-08-08 PROBLEM — N93.9 VAGINAL BLEEDING: Status: ACTIVE | Noted: 2018-08-08

## 2018-08-08 PROBLEM — R53.1 GENERALIZED WEAKNESS: Status: ACTIVE | Noted: 2018-08-08

## 2018-08-08 PROBLEM — E87.6 HYPOKALEMIA: Status: ACTIVE | Noted: 2018-08-08

## 2018-08-08 PROBLEM — D62 ACUTE BLOOD LOSS ANEMIA: Status: ACTIVE | Noted: 2018-08-08

## 2018-08-09 PROBLEM — D62 ACUTE BLOOD LOSS ANEMIA: Status: RESOLVED | Noted: 2018-08-08 | Resolved: 2018-08-09

## 2018-08-09 PROBLEM — R79.89 ELEVATED LACTIC ACID LEVEL: Status: RESOLVED | Noted: 2018-08-08 | Resolved: 2018-08-09

## 2018-08-09 PROBLEM — R53.1 GENERALIZED WEAKNESS: Status: RESOLVED | Noted: 2018-08-08 | Resolved: 2018-08-09

## 2018-08-10 ENCOUNTER — CARE COORDINATION (OUTPATIENT)
Dept: CASE MANAGEMENT | Age: 63
End: 2018-08-10

## 2018-08-10 NOTE — CARE COORDINATION
[facility] Transitions Initial Follow Up Call    Call within 2 business days of discharge: Yes    Patient: Jm Martinez Patient : 1955   MRN: [id number]  Reason for Admission: vaginal bleeding, acute anemia  Discharge Date: 18 RARS: Readmission Risk Score: 24    Facility: [address] attempt to reach patient for post 24h discharge care transition call. Message left stating purpose of call, contact information and request for return call.        Follow Up  Future Appointments  Date Time Provider Milena John   2018 9:40 AM Pooja Noel MD [facility]       Tameka Conklin RN

## 2018-08-12 NOTE — CARE COORDINATION
[facility] Transitions Initial Follow Up Call    Call within 2 business days of discharge: Yes    Patient: Nilda Donnelly Patient : 1955   MRN: [id number]  Reason for Admission: Vaginal Bleeding   Discharge Date: 18 RARS: Readmission Risk Score: 24     Spoke with: [address]: Reyes Sanchez     Non-face-to-face services provided:  Obtained and reviewed discharge summary and/or continuity of care documents    Care Transitions 24 Hour Call    Do you have any ongoing symptoms?:  No  Do you have a copy of your discharge instructions?:  Yes  Do you have all of your prescriptions and are they filled?:  Yes  Have you been contacted by a [facility] Pharmacist?:  No  Have you scheduled your follow up appointment?:  Yes  How are you going to get to your appointment?:  Car - family or friend to transport  Were you discharged with any Home Care or Post Acute Services:  No  Do you feel like you have everything you need to keep you well at home?:  Yes  Care Transitions Interventions       Spoke with patient who reports she is doing fine. Patient reports she is still a little weak but stated the vaginal bleeding has stopped. Patient states she has all her medications and taking them as prescribed. Patient has apt with oncologist on . Patient denied any further needs at this time and agreed to follow up calls. Reminded patient that if they have any questions/concerns at anytime, they can always utilize CTC or call PCP/specialist as they have an MD on call .      Follow Up  Future Appointments  Date Time Provider Milena John   2018 9:40 AM Marivel Jenkins MD [facility] ROMINA Orozco RN

## 2018-08-14 ENCOUNTER — CARE COORDINATION (OUTPATIENT)
Dept: CASE MANAGEMENT | Age: 63
End: 2018-08-14

## 2018-08-17 ENCOUNTER — CARE COORDINATION (OUTPATIENT)
Dept: CASE MANAGEMENT | Age: 63
End: 2018-08-17

## 2018-08-17 NOTE — CARE COORDINATION
[facility] Transitions Follow Up Call    2018    Patient: Juan Lebron  Patient : 1955   MRN: [id number]  Reason for Admission: There are no discharge diagnoses documented for the most recent discharge. Discharge Date: 18 RARS: Readmission Risk Score: 24           Care Transitions Subsequent and Final Call    Subsequent and Final Calls  Care Transitions Interventions  Other Interventions: Follow Up : Unable to reach patient today for transition follow up. Left a voice message with my contact information and requested return call.     Future Appointments  Date Time Provider Milena John   2018 9:40 AM Wilhelmenia Cooks, MD [facility] ROMINA Dumont RN

## 2018-08-21 ENCOUNTER — CARE COORDINATION (OUTPATIENT)
Dept: CASE MANAGEMENT | Age: 63
End: 2018-08-21

## 2018-08-21 ENCOUNTER — OFFICE VISIT (OUTPATIENT)
Dept: INTERNAL MEDICINE CLINIC | Age: 63
End: 2018-08-21

## 2018-08-21 VITALS
BODY MASS INDEX: 23.39 KG/M2 | HEART RATE: 80 BPM | DIASTOLIC BLOOD PRESSURE: 70 MMHG | WEIGHT: 140.4 LBS | SYSTOLIC BLOOD PRESSURE: 120 MMHG | HEIGHT: 65 IN

## 2018-08-21 DIAGNOSIS — C18.7 MALIGNANT NEOPLASM OF SIGMOID COLON (HCC): Primary | ICD-10-CM

## 2018-08-21 DIAGNOSIS — S98.131A AMPUTATED TOE OF RIGHT FOOT (HCC): ICD-10-CM

## 2018-08-21 DIAGNOSIS — C78.7 METASTASIS TO LIVER (HCC): Chronic | ICD-10-CM

## 2018-08-21 DIAGNOSIS — F43.21 ADJUSTMENT REACTION WITH PROLONGED DEPRESSIVE REACTION: ICD-10-CM

## 2018-08-21 PROBLEM — S42.201A CLOSED FRACTURE OF RIGHT PROXIMAL HUMERUS: Status: RESOLVED | Noted: 2018-02-17 | Resolved: 2018-08-21

## 2018-08-21 PROBLEM — N93.9 VAGINAL BLEEDING: Status: RESOLVED | Noted: 2018-08-08 | Resolved: 2018-08-21

## 2018-08-21 PROCEDURE — 3017F COLORECTAL CA SCREEN DOC REV: CPT | Performed by: FAMILY MEDICINE

## 2018-08-21 PROCEDURE — G8420 CALC BMI NORM PARAMETERS: HCPCS | Performed by: FAMILY MEDICINE

## 2018-08-21 PROCEDURE — 1111F DSCHRG MED/CURRENT MED MERGE: CPT | Performed by: FAMILY MEDICINE

## 2018-08-21 PROCEDURE — 99214 OFFICE O/P EST MOD 30 MIN: CPT | Performed by: FAMILY MEDICINE

## 2018-08-21 PROCEDURE — G8427 DOCREV CUR MEDS BY ELIG CLIN: HCPCS | Performed by: FAMILY MEDICINE

## 2018-08-21 PROCEDURE — 4004F PT TOBACCO SCREEN RCVD TLK: CPT | Performed by: FAMILY MEDICINE

## 2018-08-21 NOTE — PROGRESS NOTES
Subjective:      Patient ID: Wagner Mcgarry is a 63 y.o. female. HPI   Chief Complaint   Patient presents with    3 Month Follow-Up     FU of metastatic cancer, COPD/asthma with continued tobacco use, HTN and recent hospital stay for significant bleed and anemia. H/o cervical cancer IIB 2/2010and rectal cancer diagnosed and started on treatment in 2013. Developed lung mets I 2015. She was felt to have her bleeding from her most recent new chemo. Hospitalized and transfused. Hgb 5.0. Now unable to tolerate this new chemo and has exhausted all of the other ones in the past.  She is told that she is out of treatment options per Dr. Lalo Lopez. She lost all kinds of blood with the last chemo. Still feels tired and weak. She knows she is going to die from her cancer but is \"not ready to die. \"  She does believe in heaven but does not really have a spiritual home for support. She has daughter and other family and friends that do support her and she is not alone. She does not have a bucket list but later tells me she would like to go on a cruise --- never on one. Her friend can go with her in the Spring but she is not sure she will be here that long. Jeane and Bianca would be nice but does not think she has the stamina to make those trips. She is not ready to sign up with Hospice. She is hoping that being off chemo, that maybe she could have some improved energy and quality of life but with nephrostomy tubes and rectovaginal fistula, not sure how much better.          Patient Active Problem List   Diagnosis    Adjustment reaction with prolonged depressive reaction    Asthma    Radiation proctitis    Essential hypertension    Colostomy status (HCC)    Chronic insomnia    [facility]    Proctodynia    Malignant neoplasm of sigmoid colon ([facility]    Tobacco dependence    Asymptomatic gallstones    Metastasis to liver (HCC)    Insomnia    Urinary incontinence    Vaginal discharge    Cardiovascular: Normal rate, regular rhythm, normal heart sounds and intact distal pulses. Pulmonary/Chest: Effort normal and breath sounds normal.   Skin: Skin is warm and dry. No erythema. There is pallor (pale grayish hue to skin). Psychiatric: Her behavior is normal. Cognition and memory are normal. She exhibits a depressed mood. Vitals reviewed. Wt Readings from Last 3 Encounters:   08/21/18 140 lb 6.4 oz (63.7 kg)   08/09/18 138 lb 14.2 oz (63 kg)   07/16/18 134 lb 14.7 oz (61.2 kg)     Temp Readings from Last 3 Encounters:   08/09/18 98.8 °F (37.1 °C) (Oral)   07/16/18 97 °F (36.1 °C)   06/20/18 97.8 °F (36.6 °C) (Oral)     BP Readings from Last 3 Encounters:   08/21/18 120/70   08/09/18 111/69   07/16/18 119/66     Pulse Readings from Last 3 Encounters:   08/21/18 80   08/09/18 88   07/16/18 89     I reviewed her hospital stay and DR. Raleigh Perdomo note in [facility] link    8/13/18 Hbg 8.9,  Plt 153,000, WBC 7.2  Sodium 133. K 2.8  (I am not sure if these values are true) --- in Dr. Adriano Adame note but significant potassium drop compared to lab 4 days earlier      Lab Results   Component Value Date     08/09/2018    K 4.1 08/09/2018    K 2.6 08/09/2018     08/09/2018    CO2 30 08/09/2018    BUN 23 08/09/2018    CREATININE 1.4 08/09/2018    GLUCOSE 91 08/09/2018    GLUCOSE 107 08/08/2017    CALCIUM 7.1 08/09/2018      Lab Results   Component Value Date    WBC 6.8 08/09/2018    HGB 8.9 (L) 08/09/2018    HCT 26.1 (L) 08/09/2018    MCV 92.9 08/09/2018     08/09/2018         Assessment:      1. Malignant neoplasm of sigmoid colon ([facility]  2. Metastasis to liver [facility])  Now has terminal cancer and has exhausted treatment options at this time. Cannot give 6 month prediction. She will accept Hospice if she starts going down hill. She is hoping to feel better for a while off chemo. 3. Amputated toe of right foot ([address]  Due to ischemic dse. Still smoking and does not wish to stop.   Due to limited life span, will not do anything different. 4. Adjustment reaction with prolonged depressive reaction  General support and encouragement. Offered support groups but she declined. Let me know if she needs any home DME. Encouraged her to plan a fun event weekly and a smaller pleasurable activity daily. Plan:      See orders. See after visit summary, patient instructions, and reference hand-outs. Discussed use, benefit, and side effects of prescribed medications. Barriers to medication compliance addressed. All patient questions answered. Counseling and coordination of care is greater than 50% of the visit.   Time of visit: 30 minutes      Keanu Gastelum MD

## 2018-08-22 RX ORDER — CIPROFLOXACIN 2 MG/ML
400 INJECTION, SOLUTION INTRAVENOUS ONCE
Status: CANCELLED | OUTPATIENT
Start: 2018-08-23

## 2018-08-23 ENCOUNTER — HOSPITAL ENCOUNTER (OUTPATIENT)
Dept: INTERVENTIONAL RADIOLOGY/VASCULAR | Age: 63
Discharge: HOME | End: 2018-08-23
Attending: INTERNAL MEDICINE | Admitting: INTERNAL MEDICINE

## 2018-08-23 VITALS
TEMPERATURE: 98 F | WEIGHT: 138.78 LBS | HEART RATE: 90 BPM | SYSTOLIC BLOOD PRESSURE: 132 MMHG | HEIGHT: 65 IN | DIASTOLIC BLOOD PRESSURE: 70 MMHG | RESPIRATION RATE: 12 BRPM | OXYGEN SATURATION: 98 % | BODY MASS INDEX: 23.12 KG/M2

## 2018-08-23 DIAGNOSIS — C18.7 ADENOCARCINOMA OF SIGMOID COLON (HCC): ICD-10-CM

## 2018-08-23 DIAGNOSIS — C18.7 MALIGNANT NEOPLASM OF SIGMOID COLON (HCC): ICD-10-CM

## 2018-08-23 LAB
INR BLD: 1.25 (ref 0.86–1.14)
PROTHROMBIN TIME: 14.3 SEC (ref 9.8–13)

## 2018-08-23 RX ORDER — IODIXANOL 320 MG/ML
100 INJECTION, SOLUTION INTRAVASCULAR
Status: COMPLETED | OUTPATIENT
Start: 2018-08-23 | End: 2018-08-23

## 2018-08-23 RX ORDER — CIPROFLOXACIN 2 MG/ML
400 INJECTION, SOLUTION INTRAVENOUS ONCE
Status: COMPLETED | OUTPATIENT
Start: 2018-08-23 | End: 2018-08-23

## 2018-08-23 RX ORDER — ONDANSETRON 2 MG/ML
4 INJECTION INTRAMUSCULAR; INTRAVENOUS EVERY 8 HOURS PRN
Status: DISCONTINUED | OUTPATIENT
Start: 2018-08-23 | End: 2018-08-24 | Stop reason: HOSPADM

## 2018-08-23 RX ORDER — DIPHENHYDRAMINE HYDROCHLORIDE 50 MG/ML
INJECTION INTRAMUSCULAR; INTRAVENOUS DAILY PRN
Status: COMPLETED | OUTPATIENT
Start: 2018-08-23 | End: 2018-08-23

## 2018-08-23 RX ORDER — FENTANYL CITRATE 50 UG/ML
INJECTION, SOLUTION INTRAMUSCULAR; INTRAVENOUS DAILY PRN
Status: COMPLETED | OUTPATIENT
Start: 2018-08-23 | End: 2018-08-23

## 2018-08-23 RX ADMIN — FENTANYL CITRATE 75 MCG: 50 INJECTION, SOLUTION INTRAMUSCULAR; INTRAVENOUS at 10:24

## 2018-08-23 RX ADMIN — IODIXANOL 10 ML: 320 INJECTION, SOLUTION INTRAVASCULAR at 10:30

## 2018-08-23 RX ADMIN — CIPROFLOXACIN 400 MG: 2 INJECTION, SOLUTION INTRAVENOUS at 09:44

## 2018-08-23 RX ADMIN — DIPHENHYDRAMINE HYDROCHLORIDE 50 MG: 50 INJECTION INTRAMUSCULAR; INTRAVENOUS at 10:21

## 2018-08-23 ASSESSMENT — PAIN SCALES - GENERAL: PAINLEVEL_OUTOF10: 0

## 2018-08-23 NOTE — PRE SEDATION
Authorizing Provider   methadone (DOLOPHINE) 10 MG tablet Take 10 mg by mouth daily. .   Yes Historical Provider, MD   oxyCODONE (ROXICODONE) 20 MG immediate release tablet Take 40-80 mg by mouth every 4 hours as needed for Pain. .   Yes Historical Provider, MD   mirtazapine (REMERON) 15 MG tablet TAKE 1 TABLET BY MOUTH NIGHTLY 7/30/18  Yes Hailee Duong MD   atorvastatin (LIPITOR) 20 MG tablet TAKE 1 TABLET BY MOUTH DAILY 5/15/18  Yes Rodriguez Doyle MD   nortriptyline (PAMELOR) 75 MG capsule TAKE 1 CAPSULE BY MOUTH NIGHTLY 4/24/18  Yes Hailee Duong MD   furosemide (LASIX) 20 MG tablet TAKE 1 TO 2 TABLETS DAILY AS NEEDED FOR LEG SWELLING. 4/23/18  Yes Hailee Duong MD   fentaNYL (DURAGESIC) 100 MCG/HR Place 1 patch onto the skin every 72 hours. Yes Historical Provider, MD   tetracycline (ACHROMYCIN;SUMYCIN) 250 MG capsule Take 250 mg by mouth 2 times daily    Yes Historical Provider, MD   aspirin EC 81 MG EC tablet Take 1 tablet by mouth daily 3/31/17  Yes Rodriguez Doyle MD   sennosides-docusate sodium (SENOKOT-S) 8.6-50 MG tablet Take 2 tablets by mouth daily  Patient taking differently: Take 2 tablets by mouth daily as needed  4/27/16  Yes Hailee Duong MD   ondansetron (ZOFRAN) 8 MG tablet Take 1 tablet by mouth every 8 hours as needed for Nausea or Vomiting 1/5/16  Yes Viola Reynolds MD   albuterol sulfate HFA (VENTOLIN HFA) 108 (90 Base) MCG/ACT inhaler Inhale 2 puffs into the lungs every 6 hours as needed for Wheezing 1/9/18   Hailee Duong MD   fluticasone-vilanterol (BREO ELLIPTA) 100-25 MCG/INH AEPB 1 inhalation once a day. Rinse mouth out well after each use.   Patient taking differently: Inhale 1 puff into the lungs daily as needed  10/7/16   Hailee Duong MD     Coumadin Use Last 7 Days:  no  Antiplatelet drug therapy use last 7 days: yes - asa  Other anticoagulant use last 7 days: no  Additional Medication Information:        Pre-Sedation Documentation and Exam:   I have reviewed the patient's history and review of systems.     Mallampati Airway Assessment:  Mallampati Class II - (soft palate, fauces & uvula are visible)    Prior History of Anesthesia Complications:   none    ASA Classification:  Class 3 - A patient with severe systemic disease that limits activity but is not incapacitating    Sedation/ Anesthesia Plan:   intravenous sedation    Medications Planned:   midazolam (Versed) intravenously and fentanyl intravenously    Patient is an appropriate candidate for plan of sedation: yes    Electronically signed by Nan Bello MD on 8/23/2018 at 10:13 AM

## 2018-08-24 RX ORDER — ALBUTEROL SULFATE 90 UG/1
2 AEROSOL, METERED RESPIRATORY (INHALATION) EVERY 6 HOURS PRN
Qty: 1 INHALER | Refills: 5 | Status: SHIPPED | OUTPATIENT
Start: 2018-08-24 | End: 2018-11-06 | Stop reason: ALTCHOICE

## 2018-09-24 ENCOUNTER — HOSPITAL ENCOUNTER (INPATIENT)
Age: 63
LOS: 3 days | Discharge: NURSING, REHAB OR LONG-TERM CARE | DRG: 470 | End: 2018-09-27
Attending: EMERGENCY MEDICINE | Admitting: INTERNAL MEDICINE
Payer: MEDICARE

## 2018-09-24 ENCOUNTER — APPOINTMENT (OUTPATIENT)
Dept: GENERAL RADIOLOGY | Age: 63
DRG: 470 | End: 2018-09-24
Payer: MEDICARE

## 2018-09-24 DIAGNOSIS — N17.9 ACUTE KIDNEY INJURY (HCC): ICD-10-CM

## 2018-09-24 DIAGNOSIS — D84.9 IMMUNOCOMPROMISED STATE (HCC): ICD-10-CM

## 2018-09-24 DIAGNOSIS — N30.01 ACUTE CYSTITIS WITH HEMATURIA: ICD-10-CM

## 2018-09-24 DIAGNOSIS — R77.8 ELEVATED TROPONIN: ICD-10-CM

## 2018-09-24 DIAGNOSIS — S72.002A CLOSED FRACTURE OF NECK OF LEFT FEMUR, INITIAL ENCOUNTER (HCC): Primary | ICD-10-CM

## 2018-09-24 DIAGNOSIS — E86.0 DEHYDRATION: ICD-10-CM

## 2018-09-24 LAB
A/G RATIO: 0.6 (ref 1.1–2.2)
ABO/RH: NORMAL
ALBUMIN SERPL-MCNC: 2.2 G/DL (ref 3.4–5)
ALP BLD-CCNC: 258 U/L (ref 40–129)
ALT SERPL-CCNC: 10 U/L (ref 10–40)
ANION GAP SERPL CALCULATED.3IONS-SCNC: 12 MMOL/L (ref 3–16)
ANTIBODY SCREEN: NORMAL
APTT: 37 SEC (ref 26–36)
AST SERPL-CCNC: 16 U/L (ref 15–37)
BACTERIA: ABNORMAL /HPF
BACTERIA: ABNORMAL /HPF
BASOPHILS ABSOLUTE: 0 K/UL (ref 0–0.2)
BASOPHILS RELATIVE PERCENT: 0.2 %
BILIRUB SERPL-MCNC: 0.3 MG/DL (ref 0–1)
BILIRUBIN URINE: NEGATIVE
BILIRUBIN URINE: NEGATIVE
BLOOD, URINE: ABNORMAL
BLOOD, URINE: ABNORMAL
BUN BLDV-MCNC: 28 MG/DL (ref 7–20)
CALCIUM SERPL-MCNC: 8.4 MG/DL (ref 8.3–10.6)
CASTS: ABNORMAL /LPF
CASTS: ABNORMAL /LPF
CHLORIDE BLD-SCNC: 99 MMOL/L (ref 99–110)
CLARITY: ABNORMAL
CLARITY: ABNORMAL
CO2: 29 MMOL/L (ref 21–32)
COLOR: YELLOW
COLOR: YELLOW
COMMENT UA: ABNORMAL
COMMENT UA: ABNORMAL
CREAT SERPL-MCNC: 1.4 MG/DL (ref 0.6–1.2)
EOSINOPHILS ABSOLUTE: 0 K/UL (ref 0–0.6)
EOSINOPHILS RELATIVE PERCENT: 0.1 %
EPITHELIAL CELLS, UA: ABNORMAL /HPF
EPITHELIAL CELLS, UA: ABNORMAL /HPF
GFR AFRICAN AMERICAN: 46
GFR NON-AFRICAN AMERICAN: 38
GLOBULIN: 3.9 G/DL
GLUCOSE BLD-MCNC: 102 MG/DL (ref 70–99)
GLUCOSE URINE: NEGATIVE MG/DL
GLUCOSE URINE: NEGATIVE MG/DL
HCT VFR BLD CALC: 30.9 % (ref 36–48)
HEMOGLOBIN: 10 G/DL (ref 12–16)
INR BLD: 1.39 (ref 0.86–1.14)
KETONES, URINE: NEGATIVE MG/DL
KETONES, URINE: NEGATIVE MG/DL
LACTIC ACID: 0.9 MMOL/L (ref 0.4–2)
LEUKOCYTE ESTERASE, URINE: ABNORMAL
LEUKOCYTE ESTERASE, URINE: ABNORMAL
LYMPHOCYTES ABSOLUTE: 0.3 K/UL (ref 1–5.1)
LYMPHOCYTES RELATIVE PERCENT: 2.9 %
MCH RBC QN AUTO: 31.4 PG (ref 26–34)
MCHC RBC AUTO-ENTMCNC: 32.4 G/DL (ref 31–36)
MCV RBC AUTO: 97.1 FL (ref 80–100)
MICROSCOPIC EXAMINATION: YES
MICROSCOPIC EXAMINATION: YES
MONOCYTES ABSOLUTE: 0.5 K/UL (ref 0–1.3)
MONOCYTES RELATIVE PERCENT: 4.8 %
NEUTROPHILS ABSOLUTE: 8.6 K/UL (ref 1.7–7.7)
NEUTROPHILS RELATIVE PERCENT: 92 %
NITRITE, URINE: POSITIVE
NITRITE, URINE: POSITIVE
PDW BLD-RTO: 18 % (ref 12.4–15.4)
PH UA: 7.5
PH UA: 8.5
PLATELET # BLD: 179 K/UL (ref 135–450)
PMV BLD AUTO: 7.2 FL (ref 5–10.5)
POTASSIUM SERPL-SCNC: 3.2 MMOL/L (ref 3.5–5.1)
PROTEIN UA: ABNORMAL MG/DL
PROTEIN UA: NEGATIVE MG/DL
PROTHROMBIN TIME: 15.8 SEC (ref 9.8–13)
RBC # BLD: 3.18 M/UL (ref 4–5.2)
RBC UA: ABNORMAL /HPF (ref 0–2)
RBC UA: ABNORMAL /HPF (ref 0–2)
SODIUM BLD-SCNC: 140 MMOL/L (ref 136–145)
SPECIFIC GRAVITY UA: 1.01
SPECIFIC GRAVITY UA: 1.01
TOTAL PROTEIN: 6.1 G/DL (ref 6.4–8.2)
TROPONIN: 0.02 NG/ML
URINE REFLEX TO CULTURE: YES
URINE TYPE: ABNORMAL
URINE TYPE: ABNORMAL
UROBILINOGEN, URINE: 0.2 E.U./DL
UROBILINOGEN, URINE: 0.2 E.U./DL
WBC # BLD: 9.4 K/UL (ref 4–11)
WBC UA: ABNORMAL /HPF (ref 0–5)
WBC UA: ABNORMAL /HPF (ref 0–5)

## 2018-09-24 PROCEDURE — 73502 X-RAY EXAM HIP UNI 2-3 VIEWS: CPT

## 2018-09-24 PROCEDURE — 86850 RBC ANTIBODY SCREEN: CPT

## 2018-09-24 PROCEDURE — 85730 THROMBOPLASTIN TIME PARTIAL: CPT

## 2018-09-24 PROCEDURE — 96365 THER/PROPH/DIAG IV INF INIT: CPT

## 2018-09-24 PROCEDURE — 93005 ELECTROCARDIOGRAM TRACING: CPT | Performed by: INTERNAL MEDICINE

## 2018-09-24 PROCEDURE — 99285 EMERGENCY DEPT VISIT HI MDM: CPT

## 2018-09-24 PROCEDURE — 86900 BLOOD TYPING SEROLOGIC ABO: CPT

## 2018-09-24 PROCEDURE — 6360000002 HC RX W HCPCS: Performed by: INTERNAL MEDICINE

## 2018-09-24 PROCEDURE — 84484 ASSAY OF TROPONIN QUANT: CPT

## 2018-09-24 PROCEDURE — 86901 BLOOD TYPING SEROLOGIC RH(D): CPT

## 2018-09-24 PROCEDURE — 87040 BLOOD CULTURE FOR BACTERIA: CPT

## 2018-09-24 PROCEDURE — 71045 X-RAY EXAM CHEST 1 VIEW: CPT

## 2018-09-24 PROCEDURE — 83605 ASSAY OF LACTIC ACID: CPT

## 2018-09-24 PROCEDURE — 6360000002 HC RX W HCPCS: Performed by: NURSE PRACTITIONER

## 2018-09-24 PROCEDURE — 80053 COMPREHEN METABOLIC PANEL: CPT

## 2018-09-24 PROCEDURE — 6370000000 HC RX 637 (ALT 250 FOR IP): Performed by: INTERNAL MEDICINE

## 2018-09-24 PROCEDURE — 81001 URINALYSIS AUTO W/SCOPE: CPT

## 2018-09-24 PROCEDURE — 85025 COMPLETE CBC W/AUTO DIFF WBC: CPT

## 2018-09-24 PROCEDURE — 87077 CULTURE AEROBIC IDENTIFY: CPT

## 2018-09-24 PROCEDURE — 87086 URINE CULTURE/COLONY COUNT: CPT

## 2018-09-24 PROCEDURE — 94760 N-INVAS EAR/PLS OXIMETRY 1: CPT

## 2018-09-24 PROCEDURE — 1200000000 HC SEMI PRIVATE

## 2018-09-24 PROCEDURE — 85610 PROTHROMBIN TIME: CPT

## 2018-09-24 PROCEDURE — 2580000003 HC RX 258: Performed by: INTERNAL MEDICINE

## 2018-09-24 PROCEDURE — 87186 SC STD MICRODIL/AGAR DIL: CPT

## 2018-09-24 PROCEDURE — 99222 1ST HOSP IP/OBS MODERATE 55: CPT | Performed by: ORTHOPAEDIC SURGERY

## 2018-09-24 PROCEDURE — 2580000003 HC RX 258: Performed by: NURSE PRACTITIONER

## 2018-09-24 RX ORDER — POTASSIUM CHLORIDE 20 MEQ/1
40 TABLET, EXTENDED RELEASE ORAL PRN
Status: DISCONTINUED | OUTPATIENT
Start: 2018-09-24 | End: 2018-09-27 | Stop reason: HOSPADM

## 2018-09-24 RX ORDER — SODIUM CHLORIDE 9 MG/ML
INJECTION, SOLUTION INTRAVENOUS CONTINUOUS
Status: DISCONTINUED | OUTPATIENT
Start: 2018-09-24 | End: 2018-09-26 | Stop reason: SDUPTHER

## 2018-09-24 RX ORDER — SODIUM CHLORIDE 0.9 % (FLUSH) 0.9 %
10 SYRINGE (ML) INJECTION EVERY 12 HOURS SCHEDULED
Status: DISCONTINUED | OUTPATIENT
Start: 2018-09-24 | End: 2018-09-26 | Stop reason: SDUPTHER

## 2018-09-24 RX ORDER — FLUTICASONE FUROATE AND VILANTEROL 100; 25 UG/1; UG/1
1 POWDER RESPIRATORY (INHALATION) DAILY
Status: DISCONTINUED | OUTPATIENT
Start: 2018-09-24 | End: 2018-09-24 | Stop reason: CLARIF

## 2018-09-24 RX ORDER — MORPHINE SULFATE 4 MG/ML
4 INJECTION, SOLUTION INTRAMUSCULAR; INTRAVENOUS
Status: DISCONTINUED | OUTPATIENT
Start: 2018-09-24 | End: 2018-09-27 | Stop reason: HOSPADM

## 2018-09-24 RX ORDER — POTASSIUM CHLORIDE 7.45 MG/ML
10 INJECTION INTRAVENOUS PRN
Status: DISCONTINUED | OUTPATIENT
Start: 2018-09-24 | End: 2018-09-27 | Stop reason: HOSPADM

## 2018-09-24 RX ORDER — NICOTINE 21 MG/24HR
1 PATCH, TRANSDERMAL 24 HOURS TRANSDERMAL DAILY PRN
Status: DISCONTINUED | OUTPATIENT
Start: 2018-09-24 | End: 2018-09-27 | Stop reason: HOSPADM

## 2018-09-24 RX ORDER — ATORVASTATIN CALCIUM 20 MG/1
20 TABLET, FILM COATED ORAL NIGHTLY
Status: DISCONTINUED | OUTPATIENT
Start: 2018-09-24 | End: 2018-09-27 | Stop reason: HOSPADM

## 2018-09-24 RX ORDER — FENTANYL 100 UG/H
1 PATCH TRANSDERMAL
Status: DISCONTINUED | OUTPATIENT
Start: 2018-09-27 | End: 2018-09-27 | Stop reason: HOSPADM

## 2018-09-24 RX ORDER — MORPHINE SULFATE 2 MG/ML
2 INJECTION, SOLUTION INTRAMUSCULAR; INTRAVENOUS
Status: DISCONTINUED | OUTPATIENT
Start: 2018-09-24 | End: 2018-09-27 | Stop reason: HOSPADM

## 2018-09-24 RX ORDER — SENNA AND DOCUSATE SODIUM 50; 8.6 MG/1; MG/1
2 TABLET, FILM COATED ORAL DAILY
Status: DISCONTINUED | OUTPATIENT
Start: 2018-09-25 | End: 2018-09-27 | Stop reason: HOSPADM

## 2018-09-24 RX ORDER — NORTRIPTYLINE HYDROCHLORIDE 25 MG/1
75 CAPSULE ORAL NIGHTLY
Status: DISCONTINUED | OUTPATIENT
Start: 2018-09-24 | End: 2018-09-27 | Stop reason: HOSPADM

## 2018-09-24 RX ORDER — FAMOTIDINE 20 MG/1
20 TABLET, FILM COATED ORAL DAILY PRN
Status: DISCONTINUED | OUTPATIENT
Start: 2018-09-24 | End: 2018-09-27 | Stop reason: HOSPADM

## 2018-09-24 RX ORDER — HEPARIN SODIUM 5000 [USP'U]/ML
5000 INJECTION, SOLUTION INTRAVENOUS; SUBCUTANEOUS EVERY 8 HOURS SCHEDULED
Status: COMPLETED | OUTPATIENT
Start: 2018-09-24 | End: 2018-09-25

## 2018-09-24 RX ORDER — METHADONE HYDROCHLORIDE 10 MG/1
10 TABLET ORAL DAILY
Status: DISCONTINUED | OUTPATIENT
Start: 2018-09-25 | End: 2018-09-27 | Stop reason: HOSPADM

## 2018-09-24 RX ORDER — MAGNESIUM SULFATE 1 G/100ML
1 INJECTION INTRAVENOUS PRN
Status: DISCONTINUED | OUTPATIENT
Start: 2018-09-24 | End: 2018-09-27 | Stop reason: HOSPADM

## 2018-09-24 RX ORDER — ASPIRIN 81 MG/1
81 TABLET ORAL DAILY
Status: DISCONTINUED | OUTPATIENT
Start: 2018-09-25 | End: 2018-09-27 | Stop reason: HOSPADM

## 2018-09-24 RX ORDER — MIRTAZAPINE 15 MG/1
15 TABLET, FILM COATED ORAL NIGHTLY
Status: DISCONTINUED | OUTPATIENT
Start: 2018-09-24 | End: 2018-09-27 | Stop reason: HOSPADM

## 2018-09-24 RX ORDER — POTASSIUM CHLORIDE 20MEQ/15ML
40 LIQUID (ML) ORAL PRN
Status: DISCONTINUED | OUTPATIENT
Start: 2018-09-24 | End: 2018-09-27 | Stop reason: HOSPADM

## 2018-09-24 RX ORDER — DOCUSATE SODIUM 100 MG/1
100 CAPSULE, LIQUID FILLED ORAL 2 TIMES DAILY PRN
Status: DISCONTINUED | OUTPATIENT
Start: 2018-09-24 | End: 2018-09-26 | Stop reason: SDUPTHER

## 2018-09-24 RX ORDER — OXYCODONE HYDROCHLORIDE 10 MG/1
40 TABLET ORAL EVERY 4 HOURS PRN
Status: DISCONTINUED | OUTPATIENT
Start: 2018-09-24 | End: 2018-09-27 | Stop reason: HOSPADM

## 2018-09-24 RX ORDER — SODIUM CHLORIDE 0.9 % (FLUSH) 0.9 %
10 SYRINGE (ML) INJECTION PRN
Status: DISCONTINUED | OUTPATIENT
Start: 2018-09-24 | End: 2018-09-26 | Stop reason: SDUPTHER

## 2018-09-24 RX ORDER — ONDANSETRON 2 MG/ML
4 INJECTION INTRAMUSCULAR; INTRAVENOUS EVERY 6 HOURS PRN
Status: DISCONTINUED | OUTPATIENT
Start: 2018-09-24 | End: 2018-09-26 | Stop reason: SDUPTHER

## 2018-09-24 RX ADMIN — Medication 10 ML: at 20:52

## 2018-09-24 RX ADMIN — NORTRIPTYLINE HYDROCHLORIDE 75 MG: 25 CAPSULE ORAL at 20:51

## 2018-09-24 RX ADMIN — SODIUM CHLORIDE: 9 INJECTION, SOLUTION INTRAVENOUS at 20:51

## 2018-09-24 RX ADMIN — PIPERACILLIN SODIUM,TAZOBACTAM SODIUM 4.5 G: 4; .5 INJECTION, POWDER, FOR SOLUTION INTRAVENOUS at 16:50

## 2018-09-24 RX ADMIN — MIRTAZAPINE 15 MG: 15 TABLET, FILM COATED ORAL at 20:51

## 2018-09-24 RX ADMIN — HEPARIN SODIUM 5000 UNITS: 5000 INJECTION INTRAVENOUS; SUBCUTANEOUS at 20:52

## 2018-09-24 RX ADMIN — ATORVASTATIN CALCIUM 20 MG: 20 TABLET, FILM COATED ORAL at 20:51

## 2018-09-24 RX ADMIN — OXYCODONE HYDROCHLORIDE 40 MG: 10 TABLET ORAL at 20:51

## 2018-09-24 ASSESSMENT — PAIN DESCRIPTION - ONSET
ONSET: GRADUAL
ONSET: ON-GOING

## 2018-09-24 ASSESSMENT — PAIN SCALES - GENERAL
PAINLEVEL_OUTOF10: 0
PAINLEVEL_OUTOF10: 7
PAINLEVEL_OUTOF10: 5
PAINLEVEL_OUTOF10: 10

## 2018-09-24 ASSESSMENT — PAIN DESCRIPTION - LOCATION
LOCATION: HIP
LOCATION: HIP;GENERALIZED;BACK

## 2018-09-24 ASSESSMENT — PAIN DESCRIPTION - ORIENTATION
ORIENTATION: LEFT
ORIENTATION: LEFT

## 2018-09-24 ASSESSMENT — PAIN DESCRIPTION - PAIN TYPE
TYPE: ACUTE PAIN
TYPE: ACUTE PAIN;CHRONIC PAIN

## 2018-09-24 ASSESSMENT — PAIN DESCRIPTION - PROGRESSION
CLINICAL_PROGRESSION: GRADUALLY WORSENING
CLINICAL_PROGRESSION: NOT CHANGED

## 2018-09-24 ASSESSMENT — PAIN DESCRIPTION - DESCRIPTORS: DESCRIPTORS: ACHING

## 2018-09-24 ASSESSMENT — PAIN DESCRIPTION - FREQUENCY
FREQUENCY: CONTINUOUS
FREQUENCY: CONTINUOUS

## 2018-09-24 NOTE — ED PROVIDER NOTES
Main Topics    Smoking status: Current Every Day Smoker     Packs/day: 0.50     Years: 37.00     Types: Cigarettes    Smokeless tobacco: Never Used    Alcohol use No    Drug use: No      Comment: hx of marijuana use about 30 years ago    Sexual activity: Not Currently     Other Topics Concern    None     Social History Narrative    She is now back in her own home and her daughter lives with her. Her sister and ROJAS are living with her mother. Family History   Problem Relation Age of Onset    Heart Failure Father     Other Father         DVT    Prostate Cancer Father     Other Mother         spinal myelitis    Anesth Problems Neg Hx        PHYSICAL EXAM    VITAL SIGNS: /76   Pulse 78   Temp 98.7 °F (37.1 °C) (Oral)   Resp 16   Ht 5' 5\" (1.651 m)   Wt 130 lb 15.3 oz (59.4 kg)   LMP 01/01/2005   SpO2 90%   BMI 21.79 kg/m²    Constitutional:  Frail,emaciated female   HENT:  atraumatic, no trismus  Neck: supple, no JVD, no posterior neck tenderness  Respiratory: Breath sounds diminished throughout auscultation. Respirations are even and unlabored. No cough noted. On room air. Cardiovascular: Regular rhythm and rate, S1 and S2. No murmur. Radial pulses 2+ equal bilaterally. Pedal pulses 1+ equal bilaterally. GI:  Soft, nontender, nondistended abdomen. Left-sided colostomy with pink moist stoma. : Bilateral nephrostomy sites with thick yellow urine noted to each bag. Musculoskeletal:  No edema, left hip bruising, pain with palpation. Integument:  Poor skin turgor  Neurologic:  Alert & oriented, no slurred speech  Psych: Pleasant affect, no hallucinations    EKG    Twelve-lead EKG reviewed by myself. Ventricular rate is 106 bpm, QRS duration 88 ms, QTc 494 ms  There is no ST elevation or depression noted. I see P waves on this EKG. My interpretation is a sinus tachycardia.     Today's EKG was compared to taken on April 6, 2017    RADIOLOGY  (interpreted by the [place], [employer]   Phone [phone]   URINE RT REFLEX TO CULTURE - Abnormal; Notable for the following:     Clarity, UA CLOUDY (*)     Blood, Urine TRACE-INTACT (*)     Nitrite, Urine POSITIVE (*)     Leukocyte Esterase, Urine LARGE (*)     All other components within normal limits    Narrative:     UA ON TWO SPECIMENS, LEFT NEPHROSTOMY TUBE , RIGHT NEPHROSTOMY TUBE  Performed at:  [place] [address], [place]   Phone [phone]   URINALYSIS - Abnormal; Notable for the following:     Clarity, UA CLOUDY (*)     Blood, Urine LARGE (*)     pH, UA 8.5 (*)     Protein, UA TRACE (*)     Nitrite, Urine POSITIVE (*)     Leukocyte Esterase, Urine LARGE (*)     All other components within normal limits    Narrative:     Right nephrostomy tube. Performed at:  [address] [place]   Phone [phone]   MICROSCOPIC URINALYSIS - Abnormal; Notable for the following:     Casts 0-1 Coarse Gran (*)     WBC, UA 10-20 (*)     Bacteria, UA 3+ (*)     All other components within normal limits    Narrative:     UA ON TWO SPECIMENS, LEFT NEPHROSTOMY TUBE , RIGHT NEPHROSTOMY TUBE  Performed at:  [address] [employer]   Phone [phone]   MICROSCOPIC URINALYSIS - Abnormal; Notable for the following:     Casts 1-3 Coarse Gran (*)     WBC, UA 10-20 (*)     RBC, UA 3-5 (*)     Bacteria, UA 3+ (*)     All other components within normal limits    Narrative:     Right nephrostomy tube.   Performed at:  [address] [employer]   Phone [phone]   BASIC METABOLIC PANEL W/ REFLEX TO MG FOR LOW K  - Abnormal; Notable for the following:     Potassium reflex Magnesium 2.7 (*)     BUN 24 (*)     CREATININE 1.4 (*)     GFR Non- 38 (*)     GFR sennosides-docusate sodium (SENOKOT-S) 8.6-50 MG tablet 2 tablet (2 tablets Oral Given 9/25/18 0819)   piperacillin-tazobactam (ZOSYN) 3.375 g in dextrose 5 % 100 mL IVPB extended infusion (mini-bag) (0 g Intravenous Stopped 9/25/18 2107)   sodium chloride flush 0.9 % injection 10 mL (10 mLs Intravenous Given 9/25/18 2112)   sodium chloride flush 0.9 % injection 10 mL (not administered)   potassium chloride (KLOR-CON M) extended release tablet 40 mEq ( Oral See Alternative 9/25/18 1719)     Or   potassium chloride 20 MEQ/15ML (10%) oral solution 40 mEq ( Oral See Alternative 9/25/18 1719)     Or   potassium chloride 10 mEq/100 mL IVPB (Peripheral Line) (10 mEq Intravenous New Bag 9/25/18 1719)   magnesium sulfate 1 g in dextrose 5% 100 mL IVPB (not administered)   magnesium hydroxide (MILK OF MAGNESIA) 400 MG/5ML suspension 30 mL (not administered)   docusate sodium (COLACE) capsule 100 mg (not administered)   ondansetron (ZOFRAN) injection 4 mg (not administered)   famotidine (PEPCID) tablet 20 mg (not administered)   nicotine (NICODERM CQ) 14 MG/24HR 1 patch (not administered)   0.9 % sodium chloride infusion ( Intravenous New Bag 9/25/18 1557)   morphine (PF) injection 2 mg (not administered)     Or   morphine (PF) injection 4 mg (not administered)   mometasone-formoterol (DULERA) 200-5 MCG/ACT inhaler 2 puff (2 puffs Inhalation Not Given 9/25/18 2023)   metoprolol succinate (TOPROL XL) extended release tablet 50 mg (50 mg Oral Given 9/25/18 1213)   lactobacillus (CULTURELLE) capsule 1 capsule (1 capsule Oral Given 9/25/18 1805)   piperacillin-tazobactam (ZOSYN) 4.5 g in dextrose 5 % 100 mL IVPB (mini-bag) (0 g Intravenous Stopped 9/24/18 1750)   heparin (porcine) injection 5,000 Units (5,000 Units Subcutaneous Given 9/25/18 2110)       This patient was seen and evaluated by myself and my attending physician Dr. Parth Glass.  Differential diagnosis includes but is not limited to fracture, dislocation, neurologic injury, other soft tissue injury, other. A frail ill-appearing female. She is afebrile. She is on room air. Normotensive. Has a heart rate in the 1 teens upon arrival to ED. ST on 12 lead ECG. Had a trip and fall a couple of weeks ago. Has been walking around with left hip pain. Initially hip x-rays were ordered per nursing. X-ray as interpreted by radiology and reviewed by myself as well as Dr. Mamadou Mckee who is orthopedics in the emergency department show an angulated fracture of the left femoral neck. Because these findings we put an IV in this patient and flaco labs and ordered further imaging for preop. She has no leukocytosis on CBC. She has a hemoglobin of 10 with hematocrit of 30.9. CMP is with a sodium of 140 with a potassium of 3.2. BUN is 28 with a creatinine of 1.4 and a GFR of 38. Possible elevated to 58. History of cancer with metastases. Coags were ordered for preop. Troponin is 0.02. No chest pain. No shortness of breath. No elevation of ST segment on EKG. Portable chest x-ray shows no acute cardiopulmonary abnormality. Right lung nodules and mass in keeping with pulmonary metastatic disease. Urine samples were obtained from each nephrostomy site. She was started on fluids and IV antibiotics. Patient was admitted to the hospitalist service with Dr. Karis Burr consulting. Urinalysis results had not returned at time of admission. FINAL IMPRESSION    1. Closed fracture of neck of left femur, initial encounter ([facility]    2. Acute cystitis with hematuria    3. Acute kidney injury ([facility]    4. Dehydration    5. Elevated troponin    6.  Immunocompromised state [facility])        PLAN  Inpatient hospitalization      (Please note that this note was completed with a voice recognition program.  Every attempt was made to edit the dictations, but inevitably there remain words that are mis-transcribed.)            Kin Scruggs, DORENE - CNP  09/25/18 2127

## 2018-09-24 NOTE — H&P
Hospital Medicine   History and Physical    Patient:  Bertram Quinones                                               :1955   . 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 Patient reports that she had a mechanical fall about 1 week prior to emergency room visit. She have had ongoing pain on her left hip since that time. She decided to seek help due to concern. Workup in the emergency room includes imaging that was concerning for left femoral fracture with patient being admitted to the hospital for further workup and management.       Past Medical History:        Diagnosis Date    Acid reflux     Anemia     Arterial embolism and thrombosis of lower extremity (HCC) 3/30/2017    Asthma     Asymptomatic gallstones     Bacterial infection     Benign essential hypertension     h/o-no current    Cataract     bilateral    Cervical cancer ([address]     stage IIB, unresectable; Chemo, XRT    Chemotherapy induced neutropenia ([address] 2016    Chronic insomnia     Closed fracture of right proximal humerus 2018    Colon carcinoma metastatic to multiple sites ([address] 2013    lung, liver    Colostomy status ([address] 10/21/2011    due to rectovaginal fistula    History of blood transfusion 2017    Humerus fracture 2018    right proximal    Liver disease     Nephrostomy status ([address] 10/07/2016    bilateral percutaneous nephrostomies --hx of vesicovaginal fistulas    Pneumonia     Proctodynia   [facility]     Radiation proctitis 2011    radiation for cervical cancer    Stress incontinence     Wears glasses     reading       Past Surgical History:        Procedure Laterality Date    BREAST BIOPSY      benign     SECTION  1981    Breech    COLONOSCOPY      COLOSTOMY  fall     due to rectovaginal fistula     CYSTOSCOPY  EUS    LEG SURGERY  03/15/2017    2 stents in right leg and 1 stent in left leg    NEPHROSTOMY Bilateral 2016    8 FR NEPH TUBES, DR. Salena No    NEPHROSTOMY Bilateral 2018    Nephrostomy Tube placement; 10 Fr; Dr. Jyoti Paul NEPHROSTOMY Bilateral 2018    10F bilateral nephrostomy tubes exchanged by Dr. Mirella Smith NEPHROSTOMY Bilateral 2018    RECTAL SURGERY  2014    adenocarcinoma    TOE AMPUTATION N/A 2017    4th toe right foot    TUNNELED VENOUS PORT PLACEMENT      VAGINA SURGERY  2016    Exam under anesthesia, vaginal washout    VASCULAR SURGERY      week of 3/13/17 by dr Jyoti Leonard       Medications Prior to Admission:    Prior to Admission medications    Medication Sig Start Date End Date Taking? Authorizing Provider   methadone (DOLOPHINE) 10 MG tablet Take 10 mg by mouth daily. .   Yes Historical Provider, MD   oxyCODONE (ROXICODONE) 20 MG immediate release tablet Take 40-80 mg by mouth every 4 hours as needed for Pain. .   Yes Historical Provider, MD   mirtazapine (REMERON) 15 MG tablet TAKE 1 TABLET BY MOUTH NIGHTLY 18  Yes Amena Lyman MD   atorvastatin (LIPITOR) 20 MG tablet TAKE 1 TABLET BY MOUTH DAILY 5/15/18  Yes Montrell Townsend MD   nortriptyline (PAMELOR) 75 MG capsule TAKE 1 CAPSULE BY MOUTH NIGHTLY 18  Yes Amena Lyman MD   furosemide (LASIX) 20 MG tablet TAKE 1 TO 2 TABLETS DAILY AS NEEDED FOR LEG SWELLING. 18  Yes Amena Lyman MD   fentaNYL (DURAGESIC) 100 MCG/HR Place 1 patch onto the skin every 72 hours.    Yes Historical Provider, MD carotid bruit, no JVD, supple, symmetrical, trachea midline and thyroid not enlarged, symmetric, no tenderness/mass/nodules  Lungs: diminished breath sounds bilaterally  Heart: regular rate and rhythm  Abdomen: Colostomy noted  Extremities: Left hip bruising   Neurologic: Mental status: Alert, oriented, thought content appropriate  Capillary Refill: Brisk,< 3 seconds   Peripheral Pulses: +2 palpable, equal bilaterally       Results for orders placed or performed during the hospital encounter of 09/24/18   CBC Auto Differential   Result Value Ref Range    WBC 9.4 4.0 - 11.0 K/uL    RBC 3.18 (L) 4.00 - 5.20 M/uL    Hemoglobin 10.0 (L) 12.0 - 16.0 g/dL    Hematocrit 30.9 (L) 36.0 - 48.0 %    MCV 97.1 80.0 - 100.0 fL    MCH 31.4 26.0 - 34.0 pg    MCHC 32.4 31.0 - 36.0 g/dL    RDW 18.0 (H) 12.4 - 15.4 %    Platelets 226 312 - 024 K/uL    MPV 7.2 5.0 - 10.5 fL    Neutrophils % 92.0 %    Lymphocytes % 2.9 %    Monocytes % 4.8 %    Eosinophils % 0.1 %    Basophils % 0.2 %    Neutrophils # 8.6 (H) 1.7 - 7.7 K/uL    Lymphocytes # 0.3 (L) 1.0 - 5.1 K/uL    Monocytes # 0.5 0.0 - 1.3 K/uL    Eosinophils # 0.0 0.0 - 0.6 K/uL    Basophils # 0.0 0.0 - 0.2 K/uL   Protime-INR   Result Value Ref Range    Protime 15.8 (H) 9.8 - 13.0 sec    INR 1.39 (H) 0.86 - 1.14   APTT   Result Value Ref Range    aPTT 37.0 (H) 26.0 - 36.0 sec   Troponin   Result Value Ref Range    Troponin 0.02 (H) <0.01 ng/mL   Lactic Acid, Plasma   Result Value Ref Range    Lactic Acid 0.9 0.4 - 2.0 mmol/L     -----------------------------------------------------------------   XR CHEST PORTABLE [[id number]] Collected: 09/24/18 1538     Order Status: Completed Updated: 09/24/18 1544     Narrative:       EXAMINATION:  SINGLE XRAY VIEW OF THE CHEST    9/24/2018 3:05 pm    COMPARISON:  Chest CT 05/09/2018    HISTORY:  ORDERING SYSTEM PROVIDED HISTORY: Weakness  TECHNOLOGIST PROVIDED HISTORY:  Reason for exam:->Weakness  Ordering Physician Provided Reason for Exam: fx lt hip pre op  Acuity: Acute  Type of Exam: Ongoing    FINDINGS:  Right subclavian port tip at the cavoatrial junction.  Heart size is normal.  Pulmonary vascular is normal.  Thoracic aortic atherosclerotic disease  present.  Hyperinflated lungs compatible with COPD.  Lobulated mass in the  right lung base measures 4 x 3 cm.  Smaller nodules are seen in the right mid  lung laterally measuring 2.5 x 1.3 cm and right upper lung measuring 1.8 x  1.7 cm.  These are in pulmonary metastatic disease.  No focal airspace  consolidation, pleural effusion, or pneumothorax.     Impression:       1. No acute cardiopulmonary abnormality. 2. Right lung nodules and mass in keeping with pulmonary metastatic disease.     XR HIP LEFT (2-3 VIEWS) [[id number]] Collected: 09/24/18 1428     Order Status: Completed Updated: 09/24/18 1432     Narrative:       EXAMINATION:  2 XRAY VIEWS OF THE LEFT HIP    9/24/2018 2:03 pm    COMPARISON:  CT 08/08/2018    HISTORY:  ORDERING SYSTEM PROVIDED HISTORY: pain after fall x2 weeks ago  TECHNOLOGIST PROVIDED HISTORY:  Reason for exam:->pain after fall x2 weeks ago  Ordering Physician Provided Reason for Exam: fell 2 weeks ago intense pain  cant move left leg  hx lung ca mets to liver  Acuity: Acute  Type of Exam: Initial    FINDINGS:  Slightly angulated fracture of the left femoral neck.  Hip and SI joint  spaces are maintained.  Vascular stents project over the pelvis.  Drainage  tubes project over the abdomen.     Impression:       Angulated fracture of the left femoral neck.           EKG: Pending    Assessment/Plan:   Patient Active Problem List:      Left femoral neck fracture: Attributed to mechanical fall.   -Pain control   -Neurovascular check   -Left arthroplasty plan for Wednesday   -Cardiology consulted for medical clearance    -Orthopedic consultation        Colorectal cancer: Ongoing status post resection with colostomy. Have pulmonary and liver metastasis.    -Outpatient Oncology

## 2018-09-24 NOTE — CONSULTS
[facility] Orthopedic Surgery  Consult Note        This patient is seen in consultation at the request of DOREEN Ch CNP    Reason for Consult:  Left hip displaced femoral neck fracture. CHIEF COMPLAINT:  Left hip pain/fall    History Obtained From:  patient, family member - sister, electronic medical record    HISTORY OF PRESENT ILLNESS:    Bang Castro 63 y.o.  female well known to me with multiple medical comorbidities and metastatic cancer presents today via EMS to [address] [place] [address] Road for evaluation of a left hip pain which occurred when she fell at her sister's house on hard wood floor on her left hip 2 weeks ago and was able to walk with a walker until today with increased pain. She is complaining of left hip pain. This is better with rest and worse with bearing any wt. The pain is sharp and not radiating. No numbness or tingling sensation. No other complaint. She was seen 1st at [facility] ED, where she was x-rayed and I was consulted. The patient is known to me for right shoulder minimally displaced proximal humerus fracture, treated non operatively, 2/7/2018.     Past Medical History:        Diagnosis Date    Acid reflux     Anemia     Arterial embolism and thrombosis of lower extremity (HCC) 3/30/2017    Asthma     Asymptomatic gallstones     Bacterial infection     Benign essential hypertension     h/o-no current    Cataract     bilateral    Cervical cancer ([address] 2010    stage IIB, unresectable; Chemo, XRT    Chemotherapy induced neutropenia ([address] 2/9/2016    Chronic insomnia     Closed fracture of right proximal humerus 2/17/2018    Colon carcinoma metastatic to multiple sites ([address] 9/22/2013    lung, liver    Colostomy status ([address] 10/21/2011    due to rectovaginal fistula    History of blood transfusion 2017    Humerus fracture 02/2018    right proximal    Liver disease     Nephrostomy status ([address] 10/07/2016    bilateral percutaneous nephrostomies --hx of vesicovaginal fistulas    Pneumonia     Proctodynia     [facility]     Radiation proctitis 2011    radiation for cervical cancer    Stress incontinence     Wears glasses     reading       Past Surgical History:        Procedure Laterality Date    BREAST BIOPSY      benign     SECTION  1981    Breech    COLONOSCOPY      COLOSTOMY  fall     due to rectovaginal fistula     CYSTOSCOPY  EUS    LEG SURGERY  03/15/2017    2 stents in right leg and 1 stent in left leg    NEPHROSTOMY Bilateral 2016    8 FR NEPH TUBES, DR. Chapis Braswell    NEPHROSTOMY Bilateral 2018    Nephrostomy Tube placement; 10 Fr; Dr. Richard Soriano NEPHROSTOMY Bilateral 2018    10F bilateral nephrostomy tubes exchanged by Dr. Constance Kern NEPHROSTOMY Bilateral 2018    RECTAL SURGERY  2014    adenocarcinoma    TOE AMPUTATION N/A 2017    4th toe right foot    TUNNELED VENOUS PORT PLACEMENT      VAGINA SURGERY  2016    Exam under anesthesia, vaginal washout    VASCULAR SURGERY      week of 3/13/17 by dr Олег Balderrama       Current Medications:   Current Facility-Administered Medications: piperacillin-tazobactam (ZOSYN) 4.5 g in dextrose 5 % 100 mL IVPB (mini-bag), 4.5 g, Intravenous, Once  Facility-Administered Medications Ordered in Other Encounters: 0.9 % sodium chloride bolus, 250 mL, Intravenous, Once  0.9 % sodium chloride infusion, , Intravenous, Once  ciprofloxacin (CIPRO) IVPB 400 mg, 400 mg, Intravenous, Once  Allergies: Avastin [bevacizumab]; Codeine;  Hydrocodone-acetaminophen; Morphine; and Pcn [penicillins]    Social History     Social History    Marital status: Single     Spouse name: N/A    Number of children: 1    Years of education: N/A     Occupational History    Unemployed           Social History Main Topics    Smoking status: Current Every Day Smoker     Packs/day: 0.50     Years: 37.00     Types: Cigarettes    Smokeless tachypnea, retractions or cyanosis  Heart: regular rate and rhythm ; heart sounds normal   Hearing intact, pupil equal and reactive bilateral  Lymphatics; No groin or axillary enlarged lymph nodes. Neck; No swelling  Abdomen; soft, non distended. MUSCULOSKELETAL:   Left leg in external rotation with pain with ROM, Examination of both lower extrimiteis showing a decreased range of motion and muscle power of the left hip compare to the other side because of pain. There is mild swelling that can be seen, and ecchymosis over the left hip, but no wound. She has intact sensation and good pedal pulses bilateral.  She has significant tenderness on deep palpation over the left hip. Good strength, and no instability both upper and the other lower extremities. NVI bilateral lower and upper extermities. NEUROLOGIC:   Sensory:    Touch:                     Right Upper Extremity:  normal                   Left Upper Extremity:  normal                  Right Lower Extremity:  normal                  Left Lower Extremity:  normal        DATA:    CBC:   Lab Results   Component Value Date    WBC 6.8 08/09/2018    RBC 2.81 08/09/2018    RBC 2.25 08/08/2017    HGB 8.9 08/09/2018    HCT 26.1 08/09/2018    MCV 92.9 08/09/2018    MCH 31.7 08/09/2018    MCHC 34.2 08/09/2018    RDW 17.3 08/09/2018     08/09/2018    MPV 7.3 08/09/2018     WBC:    Lab Results   Component Value Date    WBC 6.8 08/09/2018     PT/INR:    Lab Results   Component Value Date    PROTIME 14.3 08/23/2018    INR 1.25 08/23/2018     PTT:    Lab Results   Component Value Date    APTT 47.2 08/08/2018   [APTT    IMAGING: X-rays were taken 9/24/2018, 3 views of the left hip and AP pelvis, was reviewed and showed a displaced left femoral neck fracture. IMPRESSION: Left hip displaced femoral neck fracture.       PLAN:   I discussed the overall alignment of the fracture with the patient, and treatment options including both surgical and non-surgical

## 2018-09-25 ENCOUNTER — ANESTHESIA EVENT (OUTPATIENT)
Dept: OPERATING ROOM | Age: 63
DRG: 470 | End: 2018-09-25
Payer: MEDICARE

## 2018-09-25 LAB
ANION GAP SERPL CALCULATED.3IONS-SCNC: 9 MMOL/L (ref 3–16)
BASOPHILS ABSOLUTE: 0 K/UL (ref 0–0.2)
BASOPHILS RELATIVE PERCENT: 0.6 %
BUN BLDV-MCNC: 24 MG/DL (ref 7–20)
CALCIUM SERPL-MCNC: 7.9 MG/DL (ref 8.3–10.6)
CHLORIDE BLD-SCNC: 100 MMOL/L (ref 99–110)
CO2: 30 MMOL/L (ref 21–32)
CREAT SERPL-MCNC: 1.4 MG/DL (ref 0.6–1.2)
EOSINOPHILS ABSOLUTE: 0.1 K/UL (ref 0–0.6)
EOSINOPHILS RELATIVE PERCENT: 1 %
GFR AFRICAN AMERICAN: 46
GFR NON-AFRICAN AMERICAN: 38
GLUCOSE BLD-MCNC: 90 MG/DL (ref 70–99)
HCT VFR BLD CALC: 26.9 % (ref 36–48)
HEMOGLOBIN: 8.9 G/DL (ref 12–16)
LYMPHOCYTES ABSOLUTE: 0.4 K/UL (ref 1–5.1)
LYMPHOCYTES RELATIVE PERCENT: 7.4 %
MAGNESIUM: 1.8 MG/DL (ref 1.8–2.4)
MCH RBC QN AUTO: 32.1 PG (ref 26–34)
MCHC RBC AUTO-ENTMCNC: 33.1 G/DL (ref 31–36)
MCV RBC AUTO: 96.7 FL (ref 80–100)
MONOCYTES ABSOLUTE: 0.6 K/UL (ref 0–1.3)
MONOCYTES RELATIVE PERCENT: 10.7 %
NEUTROPHILS ABSOLUTE: 4.3 K/UL (ref 1.7–7.7)
NEUTROPHILS RELATIVE PERCENT: 80.3 %
PDW BLD-RTO: 18.3 % (ref 12.4–15.4)
PLATELET # BLD: 165 K/UL (ref 135–450)
PMV BLD AUTO: 7.3 FL (ref 5–10.5)
POTASSIUM REFLEX MAGNESIUM: 2.7 MMOL/L (ref 3.5–5.1)
POTASSIUM SERPL-SCNC: 3.8 MMOL/L (ref 3.5–5.1)
RBC # BLD: 2.78 M/UL (ref 4–5.2)
SODIUM BLD-SCNC: 139 MMOL/L (ref 136–145)
WBC # BLD: 5.4 K/UL (ref 4–11)

## 2018-09-25 PROCEDURE — 80048 BASIC METABOLIC PNL TOTAL CA: CPT

## 2018-09-25 PROCEDURE — 6370000000 HC RX 637 (ALT 250 FOR IP): Performed by: INTERNAL MEDICINE

## 2018-09-25 PROCEDURE — 1200000000 HC SEMI PRIVATE

## 2018-09-25 PROCEDURE — 6360000002 HC RX W HCPCS: Performed by: INTERNAL MEDICINE

## 2018-09-25 PROCEDURE — 99222 1ST HOSP IP/OBS MODERATE 55: CPT | Performed by: INTERNAL MEDICINE

## 2018-09-25 PROCEDURE — 94761 N-INVAS EAR/PLS OXIMETRY MLT: CPT

## 2018-09-25 PROCEDURE — 2700000000 HC OXYGEN THERAPY PER DAY

## 2018-09-25 PROCEDURE — 6370000000 HC RX 637 (ALT 250 FOR IP): Performed by: NURSE PRACTITIONER

## 2018-09-25 PROCEDURE — 93010 ELECTROCARDIOGRAM REPORT: CPT | Performed by: INTERNAL MEDICINE

## 2018-09-25 PROCEDURE — 84132 ASSAY OF SERUM POTASSIUM: CPT

## 2018-09-25 PROCEDURE — 36591 DRAW BLOOD OFF VENOUS DEVICE: CPT

## 2018-09-25 PROCEDURE — 2580000003 HC RX 258: Performed by: INTERNAL MEDICINE

## 2018-09-25 PROCEDURE — 6360000002 HC RX W HCPCS: Performed by: NURSE PRACTITIONER

## 2018-09-25 PROCEDURE — 85025 COMPLETE CBC W/AUTO DIFF WBC: CPT

## 2018-09-25 PROCEDURE — 83735 ASSAY OF MAGNESIUM: CPT

## 2018-09-25 RX ORDER — LACTOBACILLUS RHAMNOSUS GG 10B CELL
1 CAPSULE ORAL 2 TIMES DAILY WITH MEALS
Status: DISCONTINUED | OUTPATIENT
Start: 2018-09-25 | End: 2018-09-27 | Stop reason: HOSPADM

## 2018-09-25 RX ORDER — METOPROLOL SUCCINATE 50 MG/1
50 TABLET, EXTENDED RELEASE ORAL DAILY
Status: DISCONTINUED | OUTPATIENT
Start: 2018-09-25 | End: 2018-09-27 | Stop reason: HOSPADM

## 2018-09-25 RX ADMIN — POTASSIUM CHLORIDE 10 MEQ: 10 INJECTION, SOLUTION INTRAVENOUS at 14:46

## 2018-09-25 RX ADMIN — DOCUSATE SODIUM AND SENNOSIDES 2 TABLET: 8.6; 5 TABLET, FILM COATED ORAL at 08:19

## 2018-09-25 RX ADMIN — OXYCODONE HYDROCHLORIDE 40 MG: 10 TABLET ORAL at 10:27

## 2018-09-25 RX ADMIN — POTASSIUM CHLORIDE 10 MEQ: 10 INJECTION, SOLUTION INTRAVENOUS at 10:27

## 2018-09-25 RX ADMIN — NORTRIPTYLINE HYDROCHLORIDE 75 MG: 25 CAPSULE ORAL at 21:10

## 2018-09-25 RX ADMIN — ATORVASTATIN CALCIUM 20 MG: 20 TABLET, FILM COATED ORAL at 21:10

## 2018-09-25 RX ADMIN — POTASSIUM CHLORIDE 10 MEQ: 10 INJECTION, SOLUTION INTRAVENOUS at 15:57

## 2018-09-25 RX ADMIN — POTASSIUM CHLORIDE 10 MEQ: 10 INJECTION, SOLUTION INTRAVENOUS at 12:02

## 2018-09-25 RX ADMIN — POTASSIUM CHLORIDE 10 MEQ: 10 INJECTION, SOLUTION INTRAVENOUS at 13:26

## 2018-09-25 RX ADMIN — MIRTAZAPINE 15 MG: 15 TABLET, FILM COATED ORAL at 21:10

## 2018-09-25 RX ADMIN — OXYCODONE HYDROCHLORIDE 40 MG: 10 TABLET ORAL at 22:05

## 2018-09-25 RX ADMIN — METHADONE HYDROCHLORIDE 10 MG: 10 TABLET ORAL at 08:18

## 2018-09-25 RX ADMIN — PIPERACILLIN SODIUM,TAZOBACTAM SODIUM 3.38 G: 3; .375 INJECTION, POWDER, FOR SOLUTION INTRAVENOUS at 02:59

## 2018-09-25 RX ADMIN — HEPARIN SODIUM 5000 UNITS: 5000 INJECTION INTRAVENOUS; SUBCUTANEOUS at 21:10

## 2018-09-25 RX ADMIN — Medication 10 ML: at 21:12

## 2018-09-25 RX ADMIN — PIPERACILLIN SODIUM,TAZOBACTAM SODIUM 3.38 G: 3; .375 INJECTION, POWDER, FOR SOLUTION INTRAVENOUS at 08:21

## 2018-09-25 RX ADMIN — Medication 1 CAPSULE: at 18:05

## 2018-09-25 RX ADMIN — OXYCODONE HYDROCHLORIDE 40 MG: 10 TABLET ORAL at 16:01

## 2018-09-25 RX ADMIN — Medication 10 ML: at 08:21

## 2018-09-25 RX ADMIN — METOPROLOL SUCCINATE 50 MG: 50 TABLET, EXTENDED RELEASE ORAL at 12:13

## 2018-09-25 RX ADMIN — POTASSIUM CHLORIDE 10 MEQ: 10 INJECTION, SOLUTION INTRAVENOUS at 17:19

## 2018-09-25 RX ADMIN — SODIUM CHLORIDE: 9 INJECTION, SOLUTION INTRAVENOUS at 15:57

## 2018-09-25 RX ADMIN — PIPERACILLIN SODIUM,TAZOBACTAM SODIUM 3.38 G: 3; .375 INJECTION, POWDER, FOR SOLUTION INTRAVENOUS at 16:34

## 2018-09-25 RX ADMIN — HEPARIN SODIUM 5000 UNITS: 5000 INJECTION INTRAVENOUS; SUBCUTANEOUS at 14:01

## 2018-09-25 RX ADMIN — HEPARIN SODIUM 5000 UNITS: 5000 INJECTION INTRAVENOUS; SUBCUTANEOUS at 06:28

## 2018-09-25 RX ADMIN — ASPIRIN 81 MG: 81 TABLET, COATED ORAL at 08:18

## 2018-09-25 ASSESSMENT — PAIN DESCRIPTION - ONSET
ONSET: ON-GOING

## 2018-09-25 ASSESSMENT — PAIN DESCRIPTION - LOCATION
LOCATION: HIP;LEG
LOCATION: HIP

## 2018-09-25 ASSESSMENT — PAIN DESCRIPTION - DESCRIPTORS
DESCRIPTORS: SPASM;SHARP
DESCRIPTORS: SHARP;SPASM
DESCRIPTORS: ACHING;SHARP
DESCRIPTORS: SHARP
DESCRIPTORS: ACHING;SHARP

## 2018-09-25 ASSESSMENT — PAIN SCALES - GENERAL
PAINLEVEL_OUTOF10: 7
PAINLEVEL_OUTOF10: 0
PAINLEVEL_OUTOF10: 9
PAINLEVEL_OUTOF10: 7
PAINLEVEL_OUTOF10: 0
PAINLEVEL_OUTOF10: 8
PAINLEVEL_OUTOF10: 0
PAINLEVEL_OUTOF10: 10
PAINLEVEL_OUTOF10: 8
PAINLEVEL_OUTOF10: 0

## 2018-09-25 ASSESSMENT — PAIN DESCRIPTION - ORIENTATION
ORIENTATION: LEFT

## 2018-09-25 ASSESSMENT — PAIN DESCRIPTION - PAIN TYPE
TYPE: ACUTE PAIN

## 2018-09-25 ASSESSMENT — PAIN DESCRIPTION - FREQUENCY
FREQUENCY: CONTINUOUS
FREQUENCY: INTERMITTENT

## 2018-09-25 ASSESSMENT — PAIN DESCRIPTION - PROGRESSION: CLINICAL_PROGRESSION: NOT CHANGED

## 2018-09-25 NOTE — PROGRESS NOTES
Patient resting comfortably with eyes closed. Scheduled dose of zosyn started. NAD noted. resps even and unlabored. Bed exit alarm on and call light within reach. Will continue to monitor.

## 2018-09-25 NOTE — ED PROVIDER NOTES
I  cared for and evaluated the patient in conjunction with the ED Advanced Practice Provider    HISTORY OF PRESENT ILLNESS  Nadia Mccullough is a 63 y.o. female presents emergency Department chief complaint of left hip pain. Symptoms continued to be present after approximately 2 weeks so she came in for further evaluation. She rates the pain as a 10 out of 10 aching in constant in nature. She denies any fevers denies any chills she does have a history of metastatic gastric cancer and severe COPD. She denies any recurrent falls. She states that she had fallen on a hard surface at home. She also does have chronic pain in her back states that this is gotten worse. She denies any fevers denies any chills she has nephrostomy tubes present no chest pain or shortness of breath. Hx of both cervical and lung cancer on chemotherapy     PHYSICAL EXAM  /74   Pulse 93   Temp 98 °F (36.7 °C) (Oral)   Resp 18   Ht 5' 5\" (1.651 m)   Wt 130 lb 15.3 oz (59.4 kg)   LMP 01/01/2005   SpO2 94%   BMI 21.79 kg/m²     On exam, the patient appears in no acute distress. Speech is clear. Breathing is unlabored. Moves all extremities, appears much older than stated age she is cachectic and chronically ill-appearing  Heart: Elevated rate regularly irregular   Lungs: Rhonchorous at bases with exhalation wheezes otherwise equal excursion  Tender with palpation at the hips otherwise no acute deformities noted at the extremities. Distal capillary refill is under 2 seconds.   Dorsalis pedis pulses 2 out of 4 equal bilaterally    EKG is performed that shows an atrial fibrillation with a rapid ventricular response with a rate of 106 is no acute ST elevation noted acute T-wave inversion nonspecific baseline artifact is noted QRS 88  QTc 494  X-rays performed that showed acute fracture of the hip which is discussed with orthopedic on call is in agreement to take the patient to surgery  Patient was found to have multiple NEPHROSTOMY TUBE , RIGHT NEPHROSTOMY TUBE  Performed at:  [facility]  [address] [place], [place] [address]   Phone [phone]   MICROSCOPIC URINALYSIS - Abnormal; Notable for the following:     Casts 1-3 Coarse Gran (*)     WBC, UA 10-20 (*)     RBC, UA 3-5 (*)     Bacteria, UA 3+ (*)     All other components within normal limits    Narrative:     Right nephrostomy tube. Performed at:  [facility]  [address] [place], [place] [address]   Phone [phone]   BASIC METABOLIC PANEL W/ REFLEX TO MG FOR LOW K  - Abnormal; Notable for the following:     Potassium reflex Magnesium 2.7 (*)     BUN 24 (*)     CREATININE 1.4 (*)     GFR Non- 38 (*)     GFR  46 (*)     Calcium 7.9 (*)     All other components within normal limits    Narrative:     Dianne Bradley  [id number] tel. [id number],  Chemistry results called to and read back by Arianna Sawyer RN, 09/25/2018  07:53, by Mehul Lebron has been rescheduled by CHAS at 9/25/18 05:00. Reason:   Silver zurita is a nurse collect  Performed at:  [facility]  [address] [place], [place] [address]   Phone [phone]   CBC WITH AUTO DIFFERENTIAL - Abnormal; Notable for the following:     RBC 2.78 (*)     Hemoglobin 8.9 (*)     Hematocrit 26.9 (*)     RDW 18.3 (*)     Lymphocytes # 0.4 (*)     All other components within normal limits    Narrative:     Collection has been rescheduled by CHAS at 9/25/18 05:00.  Reason:   Silver  t is a nurse collect  Performed at:  [facility]  [address] [place], [place] [address]   Phone [phone]   CULTURE BLOOD #1   CULTURE BLOOD #2   URINE CULTURE   LACTIC ACID, PLASMA    Narrative:     Performed at:  [facility]  [address] [facility] [address]   Phone [phone]   MAGNESIUM    Narrative:     Dianne Bradley  [id number]

## 2018-09-25 NOTE — CONSULTS
[address] [place] [address]                                   CONSULTATION    PATIENT NAME: Lisandra Mistry                     :        1955  MED REC NO:   [id number]                          ROOM:       3101  ACCOUNT NO:   [de-identified]                           ADMIT DATE: 2018  PROVIDER:     Josephine Rowell MD    CARDIAC CONSULTATION    CONSULT DATE:  2018    HISTORY OF PRESENT ILLNESS:  This is a 42-year-old female with a known  history of hypertension, peripheral artery disease, came to the hospital  after she apparently fell and she had a left hip fracture. Her EKG was  read as atrial fibrillation leading to this cardiac consultation. She  describes having no chest pain, tightness, pressure, palpitation, shortness  of breath, dizziness, syncope. REVIEW OF SYSTEMS:  Please see HPI. All other systems were reviewed and  they are negative. PAST MEDICAL HISTORY:  1. History of peripheral artery disease with angioplasties in the past.  2.  Hypertension. 3.  Asthma. 4.  _____ cervical cancer. 5.  Colon cancer. 6.  History of anemia. SOCIAL HISTORY:  She still smokes about half pack per day and has done that  for many years. Denies any alcohol usage. FAMILY HISTORY:  Father had a history of heart failure. MEDICINE:  Have been reviewed. ALLERGIES:  Have been reviewed. PHYSICAL EXAMINATION:  VITAL SIGNS:  Pulse is 102 and regular, blood pressure 150/82, respirations  16, oxygen saturation 98%, temperature 97.7. CONSTITUTIONAL:  She is alert and oriented. HEENT:  Neck is supple. No jugular venous distention. Pale conjunctivae. No thyromegaly. No carotid bruits appreciated. CARDIAC:  Reveals tachycardia. No gallop, murmur or rub appreciated. LUNGS:  Revealed diminished breath sounds. ABDOMEN:  Soft, nontender. Bowel sounds are present.   EXTREMITIES:  Show no

## 2018-09-25 NOTE — PROGRESS NOTES
8.6-50 MG tablet 2 tablet, 2 tablet, Oral, Daily  piperacillin-tazobactam (ZOSYN) 3.375 g in dextrose 5 % 100 mL IVPB extended infusion (mini-bag), 3.375 g, Intravenous, Q8H  sodium chloride flush 0.9 % injection 10 mL, 10 mL, Intravenous, 2 times per day  sodium chloride flush 0.9 % injection 10 mL, 10 mL, Intravenous, PRN  potassium chloride (KLOR-CON M) extended release tablet 40 mEq, 40 mEq, Oral, PRN **OR** potassium chloride 20 MEQ/15ML (10%) oral solution 40 mEq, 40 mEq, Oral, PRN **OR** potassium chloride 10 mEq/100 mL IVPB (Peripheral Line), 10 mEq, Intravenous, PRN  magnesium sulfate 1 g in dextrose 5% 100 mL IVPB, 1 g, Intravenous, PRN  magnesium hydroxide (MILK OF MAGNESIA) 400 MG/5ML suspension 30 mL, 30 mL, Oral, Daily PRN  docusate sodium (COLACE) capsule 100 mg, 100 mg, Oral, BID PRN  ondansetron (ZOFRAN) injection 4 mg, 4 mg, Intravenous, Q6H PRN  famotidine (PEPCID) tablet 20 mg, 20 mg, Oral, Daily PRN  nicotine (NICODERM CQ) 14 MG/24HR 1 patch, 1 patch, Transdermal, Daily PRN  0.9 % sodium chloride infusion, , Intravenous, Continuous  heparin (porcine) injection 5,000 Units, 5,000 Units, Subcutaneous, 3 times per day  morphine (PF) injection 2 mg, 2 mg, Intravenous, Q2H PRN **OR** morphine (PF) injection 4 mg, 4 mg, Intravenous, Q2H PRN  mometasone-formoterol (DULERA) 200-5 MCG/ACT inhaler 2 puff, 2 puff, Inhalation, BID  Facility-Administered Medications Ordered in Other Encounters: 0.9 % sodium chloride bolus, 250 mL, Intravenous, Once  0.9 % sodium chloride infusion, , Intravenous, Once  ciprofloxacin (CIPRO) IVPB 400 mg, 400 mg, Intravenous, Once    ASSESSMENT AND PLAN      Fall  Left hip pain  Left femoral neck fx, displaced.    Plan left hip hemiarthroplasty tomorrow 730am per Dr Tom Sarkar  NPO after MN  Medical mgmt per hospitalist.   [facility], on [address] [place] [address]  9/25/2018  8:37 AM

## 2018-09-25 NOTE — PROGRESS NOTES
2013    lung, liver    Colostomy status ([address] 10/21/2011    due to rectovaginal fistula    History of blood transfusion     Humerus fracture 2018    right proximal    Liver disease     Nephrostomy status ([address] 10/07/2016    bilateral percutaneous nephrostomies --hx of vesicovaginal fistulas    Pneumonia     Proctodynia     PVD (peripheral vascular disease) ([address]     Radiation proctitis 2011    radiation for cervical cancer    Stress incontinence     Wears glasses     reading       Past Surgical History:        Procedure Laterality Date    BREAST BIOPSY      benign     SECTION  1981    Breech    COLONOSCOPY      COLOSTOMY  fall     due to rectovaginal fistula     CYSTOSCOPY  EUS    LEG SURGERY  03/15/2017    2 stents in right leg and 1 stent in left leg    NEPHROSTOMY Bilateral 2016    8 FR NEPH TUBES, DR. Juan Francisco Moreno    NEPHROSTOMY Bilateral 2018    Nephrostomy Tube placement; 10 Fr; Dr. Latonia Minaing NEPHROSTOMY Bilateral 2018    10F bilateral nephrostomy tubes exchanged by Dr. Mehreen Ramos NEPHROSTOMY Bilateral 2018    RECTAL SURGERY  2014    adenocarcinoma    TOE AMPUTATION N/A 2017    4th toe right foot    TUNNELED VENOUS PORT PLACEMENT      VAGINA SURGERY  2016    Exam under anesthesia, vaginal washout    VASCULAR SURGERY      week of 3/13/17 by dr Benjamin Smith       Allergies: Avastin [bevacizumab]; Codeine; Hydrocodone-acetaminophen; Morphine; and Pcn [penicillins]    Past medical and surgical history reviewed. Any changes have been noted.      Scheduled and prn Medications:    Scheduled Meds:   aspirin EC  81 mg Oral Daily    atorvastatin  20 mg Oral Nightly    [START ON 2018] fentaNYL  1 patch Transdermal Q72H    methadone  10 mg Oral Daily    mirtazapine  15 mg Oral Nightly    nortriptyline  75 mg Oral Nightly    sennosides-docusate sodium  2 tablet Oral Daily    piperacillin-tazobactam  3.375 g Intravenous Q8H  sodium chloride flush  10 mL Intravenous 2 times per day    heparin (porcine)  5,000 Units Subcutaneous 3 times per day    mometasone-formoterol  2 puff Inhalation BID     Continuous Infusions:   sodium chloride 75 mL/hr at 09/24/18 2051     PRN Meds:.oxyCODONE, sodium chloride flush, potassium chloride **OR** potassium chloride **OR** potassium chloride, magnesium sulfate, magnesium hydroxide, docusate sodium, ondansetron, famotidine, nicotine, morphine **OR** morphine    PHYSICAL EXAM:  /74   Pulse 98   Temp 97.8 °F (36.6 °C) (Oral)   Resp (!) 166   Ht 5' 5\" (1.651 m)   Wt 130 lb 15.3 oz (59.4 kg)   LMP 01/01/2005   SpO2 90%   BMI 21.79 kg/m²       Intake/Output Summary (Last 24 hours) at 09/25/18 1003  Last data filed at 09/25/18 0823   Gross per 24 hour   Intake              460 ml   Output              750 ml   Net             -290 ml       General: Somnolent but arousable. Resting in bed in no apparent distress. HEENT: Normocephalic. Atraumatic. Pupils equal and reactive. EOM intact. Oral mucosa pink/moist/intact. Neck: Supple. Symmetrical. Trachea midline. Lungs: Clear to auscultation bilaterally. Respirations even and unlabored. Chest: Exam unremarkable. Cardiac: S1/S2 noted. Regular Rhythm and rate. Abdomen/GI: Soft. Non-tender. Non-distended. BS+. Extremities: PP+. No redness/cyanosis/edema noted. Brisk cap refill. Slight external rotation of LLE. Sensation intact. Skin: Dry and intact. No lesions noted. Neuro: Grossly intact. No focal deficits noted.      LABS:    Lab Results   Component Value Date    WBC 5.4 09/25/2018    HGB 8.9 (L) 09/25/2018    HCT 26.9 (L) 09/25/2018    MCV 96.7 09/25/2018     09/25/2018    LABLYMP 1.0 08/08/2017    MID 0.5 08/08/2017    GRAN 2.6 08/08/2017    LYMPHOPCT 7.4 09/25/2018    MIDPERCENT 11.7 08/08/2017    GRANULOCYTES 64.1 08/08/2017    RBC 2.78 (L) 09/25/2018    MCH 32.1 09/25/2018    MCHC 33.1 09/25/2018    RDW 18.3 (H) 09/25/2018

## 2018-09-25 NOTE — CONSULTS
Chart reviewed. Full note to follow. No A fib. Will add BB for HTN & tachycardia  -Cleared for surgery.

## 2018-09-26 ENCOUNTER — APPOINTMENT (OUTPATIENT)
Dept: GENERAL RADIOLOGY | Age: 63
DRG: 470 | End: 2018-09-26
Payer: MEDICARE

## 2018-09-26 ENCOUNTER — ANESTHESIA (OUTPATIENT)
Dept: OPERATING ROOM | Age: 63
DRG: 470 | End: 2018-09-26
Payer: MEDICARE

## 2018-09-26 VITALS — OXYGEN SATURATION: 100 % | SYSTOLIC BLOOD PRESSURE: 114 MMHG | DIASTOLIC BLOOD PRESSURE: 54 MMHG

## 2018-09-26 LAB
ANION GAP SERPL CALCULATED.3IONS-SCNC: 8 MMOL/L (ref 3–16)
BASOPHILS ABSOLUTE: 0.1 K/UL (ref 0–0.2)
BASOPHILS RELATIVE PERCENT: 1 %
BUN BLDV-MCNC: 21 MG/DL (ref 7–20)
CALCIUM SERPL-MCNC: 8 MG/DL (ref 8.3–10.6)
CHLORIDE BLD-SCNC: 104 MMOL/L (ref 99–110)
CO2: 26 MMOL/L (ref 21–32)
CREAT SERPL-MCNC: 1.2 MG/DL (ref 0.6–1.2)
EOSINOPHILS ABSOLUTE: 0 K/UL (ref 0–0.6)
EOSINOPHILS RELATIVE PERCENT: 0.7 %
GFR AFRICAN AMERICAN: 55
GFR NON-AFRICAN AMERICAN: 46
GLUCOSE BLD-MCNC: 116 MG/DL (ref 70–99)
HCT VFR BLD CALC: 24.4 % (ref 36–48)
HCT VFR BLD CALC: 26.3 % (ref 36–48)
HEMOGLOBIN: 7.9 G/DL (ref 12–16)
HEMOGLOBIN: 8.8 G/DL (ref 12–16)
LYMPHOCYTES ABSOLUTE: 0.5 K/UL (ref 1–5.1)
LYMPHOCYTES RELATIVE PERCENT: 8.3 %
MAGNESIUM: 1.8 MG/DL (ref 1.8–2.4)
MCH RBC QN AUTO: 32.6 PG (ref 26–34)
MCHC RBC AUTO-ENTMCNC: 33.3 G/DL (ref 31–36)
MCV RBC AUTO: 97.6 FL (ref 80–100)
MONOCYTES ABSOLUTE: 0.6 K/UL (ref 0–1.3)
MONOCYTES RELATIVE PERCENT: 9.7 %
NEUTROPHILS ABSOLUTE: 5.2 K/UL (ref 1.7–7.7)
NEUTROPHILS RELATIVE PERCENT: 80.3 %
PDW BLD-RTO: 18.1 % (ref 12.4–15.4)
PLATELET # BLD: 169 K/UL (ref 135–450)
PMV BLD AUTO: 7.2 FL (ref 5–10.5)
POTASSIUM REFLEX MAGNESIUM: 3.8 MMOL/L (ref 3.5–5.1)
RBC # BLD: 2.69 M/UL (ref 4–5.2)
SODIUM BLD-SCNC: 138 MMOL/L (ref 136–145)
WBC # BLD: 6.4 K/UL (ref 4–11)

## 2018-09-26 PROCEDURE — 6370000000 HC RX 637 (ALT 250 FOR IP): Performed by: INTERNAL MEDICINE

## 2018-09-26 PROCEDURE — 7100000000 HC PACU RECOVERY - FIRST 15 MIN: Performed by: ORTHOPAEDIC SURGERY

## 2018-09-26 PROCEDURE — 6370000000 HC RX 637 (ALT 250 FOR IP): Performed by: NURSE PRACTITIONER

## 2018-09-26 PROCEDURE — 97530 THERAPEUTIC ACTIVITIES: CPT

## 2018-09-26 PROCEDURE — 85025 COMPLETE CBC W/AUTO DIFF WBC: CPT

## 2018-09-26 PROCEDURE — 6370000000 HC RX 637 (ALT 250 FOR IP): Performed by: ORTHOPAEDIC SURGERY

## 2018-09-26 PROCEDURE — G8978 MOBILITY CURRENT STATUS: HCPCS

## 2018-09-26 PROCEDURE — 2580000003 HC RX 258: Performed by: INTERNAL MEDICINE

## 2018-09-26 PROCEDURE — 85014 HEMATOCRIT: CPT

## 2018-09-26 PROCEDURE — 88311 DECALCIFY TISSUE: CPT

## 2018-09-26 PROCEDURE — 6360000002 HC RX W HCPCS: Performed by: ORTHOPAEDIC SURGERY

## 2018-09-26 PROCEDURE — 88305 TISSUE EXAM BY PATHOLOGIST: CPT

## 2018-09-26 PROCEDURE — 6360000002 HC RX W HCPCS: Performed by: NURSE ANESTHETIST, CERTIFIED REGISTERED

## 2018-09-26 PROCEDURE — 2500000003 HC RX 250 WO HCPCS: Performed by: NURSE ANESTHETIST, CERTIFIED REGISTERED

## 2018-09-26 PROCEDURE — 85018 HEMOGLOBIN: CPT

## 2018-09-26 PROCEDURE — C1776 JOINT DEVICE (IMPLANTABLE): HCPCS | Performed by: ORTHOPAEDIC SURGERY

## 2018-09-26 PROCEDURE — 97116 GAIT TRAINING THERAPY: CPT

## 2018-09-26 PROCEDURE — 3700000000 HC ANESTHESIA ATTENDED CARE: Performed by: ORTHOPAEDIC SURGERY

## 2018-09-26 PROCEDURE — 3700000001 HC ADD 15 MINUTES (ANESTHESIA): Performed by: ORTHOPAEDIC SURGERY

## 2018-09-26 PROCEDURE — 2709999900 HC NON-CHARGEABLE SUPPLY: Performed by: ORTHOPAEDIC SURGERY

## 2018-09-26 PROCEDURE — 97166 OT EVAL MOD COMPLEX 45 MIN: CPT

## 2018-09-26 PROCEDURE — 3600000014 HC SURGERY LEVEL 4 ADDTL 15MIN: Performed by: ORTHOPAEDIC SURGERY

## 2018-09-26 PROCEDURE — 27236 TREAT THIGH FRACTURE: CPT | Performed by: ORTHOPAEDIC SURGERY

## 2018-09-26 PROCEDURE — 2580000003 HC RX 258: Performed by: ORTHOPAEDIC SURGERY

## 2018-09-26 PROCEDURE — 2500000003 HC RX 250 WO HCPCS

## 2018-09-26 PROCEDURE — 6360000002 HC RX W HCPCS: Performed by: NURSE PRACTITIONER

## 2018-09-26 PROCEDURE — G8979 MOBILITY GOAL STATUS: HCPCS

## 2018-09-26 PROCEDURE — 6360000002 HC RX W HCPCS: Performed by: INTERNAL MEDICINE

## 2018-09-26 PROCEDURE — 7100000001 HC PACU RECOVERY - ADDTL 15 MIN: Performed by: ORTHOPAEDIC SURGERY

## 2018-09-26 PROCEDURE — 80048 BASIC METABOLIC PNL TOTAL CA: CPT

## 2018-09-26 PROCEDURE — G8987 SELF CARE CURRENT STATUS: HCPCS

## 2018-09-26 PROCEDURE — 0SRS0JA REPLACEMENT OF LEFT HIP JOINT, FEMORAL SURFACE WITH SYNTHETIC SUBSTITUTE, UNCEMENTED, OPEN APPROACH: ICD-10-PCS | Performed by: ORTHOPAEDIC SURGERY

## 2018-09-26 PROCEDURE — 73502 X-RAY EXAM HIP UNI 2-3 VIEWS: CPT

## 2018-09-26 PROCEDURE — 83735 ASSAY OF MAGNESIUM: CPT

## 2018-09-26 PROCEDURE — 3600000004 HC SURGERY LEVEL 4 BASE: Performed by: ORTHOPAEDIC SURGERY

## 2018-09-26 PROCEDURE — G8988 SELF CARE GOAL STATUS: HCPCS

## 2018-09-26 PROCEDURE — 97162 PT EVAL MOD COMPLEX 30 MIN: CPT

## 2018-09-26 PROCEDURE — 1200000000 HC SEMI PRIVATE

## 2018-09-26 DEVICE — DUP USE 125194 IMPL HIP FEM HEAD LFIT V40 26MM 3MM: Type: IMPLANTABLE DEVICE | Site: HIP | Status: FUNCTIONAL

## 2018-09-26 DEVICE — IMPLANTABLE DEVICE: Type: IMPLANTABLE DEVICE | Site: HIP | Status: FUNCTIONAL

## 2018-09-26 DEVICE — HEAD FEM OD46MM ID26MM HIP CO CHROM POLYETH BPLR CEMENTLESS: Type: IMPLANTABLE DEVICE | Site: HIP | Status: FUNCTIONAL

## 2018-09-26 DEVICE — COMPONENT TOT HIP CAPPED ADV STRYBIPOLA] STRYKER CORP]: Type: IMPLANTABLE DEVICE | Site: HIP | Status: FUNCTIONAL

## 2018-09-26 RX ORDER — ONDANSETRON 2 MG/ML
4 INJECTION INTRAMUSCULAR; INTRAVENOUS
Status: DISCONTINUED | OUTPATIENT
Start: 2018-09-26 | End: 2018-09-26 | Stop reason: HOSPADM

## 2018-09-26 RX ORDER — FENTANYL CITRATE 50 UG/ML
25 INJECTION, SOLUTION INTRAMUSCULAR; INTRAVENOUS EVERY 5 MIN PRN
Status: DISCONTINUED | OUTPATIENT
Start: 2018-09-26 | End: 2018-09-26 | Stop reason: HOSPADM

## 2018-09-26 RX ORDER — HEPARIN SODIUM 5000 [USP'U]/ML
5000 INJECTION, SOLUTION INTRAVENOUS; SUBCUTANEOUS EVERY 8 HOURS SCHEDULED
Status: DISCONTINUED | OUTPATIENT
Start: 2018-09-26 | End: 2018-09-27 | Stop reason: HOSPADM

## 2018-09-26 RX ORDER — DOCUSATE SODIUM 100 MG/1
100 CAPSULE, LIQUID FILLED ORAL 2 TIMES DAILY
Status: DISCONTINUED | OUTPATIENT
Start: 2018-09-26 | End: 2018-09-26 | Stop reason: SDUPTHER

## 2018-09-26 RX ORDER — SODIUM CHLORIDE 0.9 % (FLUSH) 0.9 %
10 SYRINGE (ML) INJECTION PRN
Status: DISCONTINUED | OUTPATIENT
Start: 2018-09-26 | End: 2018-09-27 | Stop reason: HOSPADM

## 2018-09-26 RX ORDER — ONDANSETRON 2 MG/ML
INJECTION INTRAMUSCULAR; INTRAVENOUS PRN
Status: DISCONTINUED | OUTPATIENT
Start: 2018-09-26 | End: 2018-09-26 | Stop reason: SDUPTHER

## 2018-09-26 RX ORDER — ACETAMINOPHEN 325 MG/1
650 TABLET ORAL EVERY 4 HOURS PRN
Status: DISCONTINUED | OUTPATIENT
Start: 2018-09-26 | End: 2018-09-27 | Stop reason: HOSPADM

## 2018-09-26 RX ORDER — PROPOFOL 10 MG/ML
INJECTION, EMULSION INTRAVENOUS PRN
Status: DISCONTINUED | OUTPATIENT
Start: 2018-09-26 | End: 2018-09-26 | Stop reason: SDUPTHER

## 2018-09-26 RX ORDER — FENTANYL CITRATE 50 UG/ML
INJECTION, SOLUTION INTRAMUSCULAR; INTRAVENOUS PRN
Status: DISCONTINUED | OUTPATIENT
Start: 2018-09-26 | End: 2018-09-26 | Stop reason: SDUPTHER

## 2018-09-26 RX ORDER — ONDANSETRON 2 MG/ML
4 INJECTION INTRAMUSCULAR; INTRAVENOUS EVERY 6 HOURS PRN
Status: DISCONTINUED | OUTPATIENT
Start: 2018-09-26 | End: 2018-09-26 | Stop reason: SDUPTHER

## 2018-09-26 RX ORDER — DEXAMETHASONE SODIUM PHOSPHATE 4 MG/ML
INJECTION, SOLUTION INTRA-ARTICULAR; INTRALESIONAL; INTRAMUSCULAR; INTRAVENOUS; SOFT TISSUE PRN
Status: DISCONTINUED | OUTPATIENT
Start: 2018-09-26 | End: 2018-09-26 | Stop reason: SDUPTHER

## 2018-09-26 RX ORDER — GLYCOPYRROLATE 0.2 MG/ML
INJECTION INTRAMUSCULAR; INTRAVENOUS PRN
Status: DISCONTINUED | OUTPATIENT
Start: 2018-09-26 | End: 2018-09-26 | Stop reason: SDUPTHER

## 2018-09-26 RX ORDER — SODIUM CHLORIDE 450 MG/100ML
INJECTION, SOLUTION INTRAVENOUS CONTINUOUS
Status: DISCONTINUED | OUTPATIENT
Start: 2018-09-26 | End: 2018-09-26 | Stop reason: SDUPTHER

## 2018-09-26 RX ORDER — SODIUM CHLORIDE 450 MG/100ML
INJECTION, SOLUTION INTRAVENOUS CONTINUOUS
Status: DISCONTINUED | OUTPATIENT
Start: 2018-09-26 | End: 2018-09-27 | Stop reason: HOSPADM

## 2018-09-26 RX ORDER — VECURONIUM BROMIDE 1 MG/ML
INJECTION, POWDER, LYOPHILIZED, FOR SOLUTION INTRAVENOUS PRN
Status: DISCONTINUED | OUTPATIENT
Start: 2018-09-26 | End: 2018-09-26 | Stop reason: SDUPTHER

## 2018-09-26 RX ORDER — SODIUM CHLORIDE 0.9 % (FLUSH) 0.9 %
10 SYRINGE (ML) INJECTION EVERY 12 HOURS SCHEDULED
Status: DISCONTINUED | OUTPATIENT
Start: 2018-09-26 | End: 2018-09-27 | Stop reason: HOSPADM

## 2018-09-26 RX ORDER — MAGNESIUM HYDROXIDE 1200 MG/15ML
LIQUID ORAL CONTINUOUS PRN
Status: COMPLETED | OUTPATIENT
Start: 2018-09-26 | End: 2018-09-26

## 2018-09-26 RX ORDER — SODIUM CHLORIDE 0.9 % (FLUSH) 0.9 %
10 SYRINGE (ML) INJECTION EVERY 12 HOURS SCHEDULED
Status: DISCONTINUED | OUTPATIENT
Start: 2018-09-26 | End: 2018-09-26 | Stop reason: SDUPTHER

## 2018-09-26 RX ORDER — MIDAZOLAM HYDROCHLORIDE 1 MG/ML
INJECTION INTRAMUSCULAR; INTRAVENOUS PRN
Status: DISCONTINUED | OUTPATIENT
Start: 2018-09-26 | End: 2018-09-26 | Stop reason: SDUPTHER

## 2018-09-26 RX ORDER — ONDANSETRON 2 MG/ML
4 INJECTION INTRAMUSCULAR; INTRAVENOUS EVERY 6 HOURS PRN
Status: DISCONTINUED | OUTPATIENT
Start: 2018-09-26 | End: 2018-09-27 | Stop reason: HOSPADM

## 2018-09-26 RX ORDER — ACETAMINOPHEN 325 MG/1
650 TABLET ORAL EVERY 4 HOURS PRN
Status: DISCONTINUED | OUTPATIENT
Start: 2018-09-26 | End: 2018-09-26 | Stop reason: SDUPTHER

## 2018-09-26 RX ORDER — SUCCINYLCHOLINE CHLORIDE 20 MG/ML
INJECTION INTRAMUSCULAR; INTRAVENOUS PRN
Status: DISCONTINUED | OUTPATIENT
Start: 2018-09-26 | End: 2018-09-26 | Stop reason: SDUPTHER

## 2018-09-26 RX ORDER — DOCUSATE SODIUM 100 MG/1
100 CAPSULE, LIQUID FILLED ORAL 2 TIMES DAILY
Status: DISCONTINUED | OUTPATIENT
Start: 2018-09-26 | End: 2018-09-27 | Stop reason: HOSPADM

## 2018-09-26 RX ORDER — LIDOCAINE HYDROCHLORIDE 20 MG/ML
INJECTION, SOLUTION INFILTRATION; PERINEURAL PRN
Status: DISCONTINUED | OUTPATIENT
Start: 2018-09-26 | End: 2018-09-26 | Stop reason: SDUPTHER

## 2018-09-26 RX ORDER — SODIUM CHLORIDE 0.9 % (FLUSH) 0.9 %
10 SYRINGE (ML) INJECTION PRN
Status: DISCONTINUED | OUTPATIENT
Start: 2018-09-26 | End: 2018-09-26 | Stop reason: SDUPTHER

## 2018-09-26 RX ADMIN — GLYCOPYRROLATE 0.2 MG: 0.2 INJECTION, SOLUTION INTRAMUSCULAR; INTRAVENOUS at 08:01

## 2018-09-26 RX ADMIN — METOPROLOL SUCCINATE 50 MG: 50 TABLET, EXTENDED RELEASE ORAL at 12:11

## 2018-09-26 RX ADMIN — DEXAMETHASONE SODIUM PHOSPHATE 8 MG: 4 INJECTION, SOLUTION INTRAMUSCULAR; INTRAVENOUS at 08:01

## 2018-09-26 RX ADMIN — VECURONIUM BROMIDE 5 MG: 1 INJECTION, POWDER, LYOPHILIZED, FOR SOLUTION INTRAVENOUS at 07:34

## 2018-09-26 RX ADMIN — OXYCODONE HYDROCHLORIDE 40 MG: 10 TABLET ORAL at 16:45

## 2018-09-26 RX ADMIN — SODIUM CHLORIDE: 9 INJECTION, SOLUTION INTRAVENOUS at 08:43

## 2018-09-26 RX ADMIN — OXYCODONE HYDROCHLORIDE 40 MG: 10 TABLET ORAL at 12:12

## 2018-09-26 RX ADMIN — Medication 2 G: at 23:55

## 2018-09-26 RX ADMIN — Medication 2 G: at 16:46

## 2018-09-26 RX ADMIN — PIPERACILLIN SODIUM,TAZOBACTAM SODIUM 3.38 G: 3; .375 INJECTION, POWDER, FOR SOLUTION INTRAVENOUS at 17:00

## 2018-09-26 RX ADMIN — ATORVASTATIN CALCIUM 20 MG: 20 TABLET, FILM COATED ORAL at 20:21

## 2018-09-26 RX ADMIN — PROPOFOL 100 MG: 10 INJECTION, EMULSION INTRAVENOUS at 07:34

## 2018-09-26 RX ADMIN — FENTANYL CITRATE 100 MCG: 50 INJECTION INTRAMUSCULAR; INTRAVENOUS at 07:34

## 2018-09-26 RX ADMIN — ASPIRIN 81 MG: 81 TABLET, COATED ORAL at 12:12

## 2018-09-26 RX ADMIN — NORTRIPTYLINE HYDROCHLORIDE 75 MG: 25 CAPSULE ORAL at 20:21

## 2018-09-26 RX ADMIN — SUGAMMADEX 125 MG: 100 INJECTION, SOLUTION INTRAVENOUS at 08:46

## 2018-09-26 RX ADMIN — SODIUM CHLORIDE: 4.5 INJECTION, SOLUTION INTRAVENOUS at 12:12

## 2018-09-26 RX ADMIN — SODIUM CHLORIDE: 9 INJECTION, SOLUTION INTRAVENOUS at 07:29

## 2018-09-26 RX ADMIN — Medication 1 CAPSULE: at 12:12

## 2018-09-26 RX ADMIN — MIDAZOLAM 2 MG: 1 INJECTION INTRAMUSCULAR; INTRAVENOUS at 07:29

## 2018-09-26 RX ADMIN — HEPARIN SODIUM 5000 UNITS: 5000 INJECTION INTRAVENOUS; SUBCUTANEOUS at 16:45

## 2018-09-26 RX ADMIN — METHADONE HYDROCHLORIDE 10 MG: 10 TABLET ORAL at 12:11

## 2018-09-26 RX ADMIN — DOCUSATE SODIUM 100 MG: 100 CAPSULE, LIQUID FILLED ORAL at 20:21

## 2018-09-26 RX ADMIN — ONDANSETRON 4 MG: 2 INJECTION INTRAMUSCULAR; INTRAVENOUS at 08:01

## 2018-09-26 RX ADMIN — MIRTAZAPINE 15 MG: 15 TABLET, FILM COATED ORAL at 20:21

## 2018-09-26 RX ADMIN — PIPERACILLIN SODIUM,TAZOBACTAM SODIUM 3.38 G: 3; .375 INJECTION, POWDER, FOR SOLUTION INTRAVENOUS at 12:13

## 2018-09-26 RX ADMIN — SUCCINYLCHOLINE CHLORIDE 100 MG: 20 INJECTION, SOLUTION INTRAMUSCULAR; INTRAVENOUS at 07:34

## 2018-09-26 RX ADMIN — DOCUSATE SODIUM AND SENNOSIDES 2 TABLET: 8.6; 5 TABLET, FILM COATED ORAL at 12:11

## 2018-09-26 RX ADMIN — PIPERACILLIN SODIUM,TAZOBACTAM SODIUM 3.38 G: 3; .375 INJECTION, POWDER, FOR SOLUTION INTRAVENOUS at 00:42

## 2018-09-26 RX ADMIN — LIDOCAINE HYDROCHLORIDE 100 MG: 20 INJECTION, SOLUTION INFILTRATION; PERINEURAL at 07:34

## 2018-09-26 RX ADMIN — HEPARIN SODIUM 5000 UNITS: 5000 INJECTION INTRAVENOUS; SUBCUTANEOUS at 20:21

## 2018-09-26 RX ADMIN — Medication 2 G: at 07:25

## 2018-09-26 RX ADMIN — OXYCODONE HYDROCHLORIDE 40 MG: 10 TABLET ORAL at 20:21

## 2018-09-26 RX ADMIN — Medication 1 CAPSULE: at 16:45

## 2018-09-26 RX ADMIN — DOCUSATE SODIUM 100 MG: 100 CAPSULE, LIQUID FILLED ORAL at 12:12

## 2018-09-26 RX ADMIN — FAMOTIDINE 20 MG: 20 TABLET, FILM COATED ORAL at 12:11

## 2018-09-26 ASSESSMENT — PULMONARY FUNCTION TESTS
PIF_VALUE: 16
PIF_VALUE: 16
PIF_VALUE: 15
PIF_VALUE: 16
PIF_VALUE: 1
PIF_VALUE: 15
PIF_VALUE: 16
PIF_VALUE: 17
PIF_VALUE: 16
PIF_VALUE: 17
PIF_VALUE: 16
PIF_VALUE: 16
PIF_VALUE: 17
PIF_VALUE: 16
PIF_VALUE: 15
PIF_VALUE: 20
PIF_VALUE: 15
PIF_VALUE: 16
PIF_VALUE: 16
PIF_VALUE: 20
PIF_VALUE: 16
PIF_VALUE: 4
PIF_VALUE: 17
PIF_VALUE: 16
PIF_VALUE: 20
PIF_VALUE: 15
PIF_VALUE: 17
PIF_VALUE: 15
PIF_VALUE: 17
PIF_VALUE: 50
PIF_VALUE: 1
PIF_VALUE: 15
PIF_VALUE: 16
PIF_VALUE: 15
PIF_VALUE: 20
PIF_VALUE: 16
PIF_VALUE: 15
PIF_VALUE: 2
PIF_VALUE: 17
PIF_VALUE: 15
PIF_VALUE: 20
PIF_VALUE: 16
PIF_VALUE: 20
PIF_VALUE: 18
PIF_VALUE: 15
PIF_VALUE: 16
PIF_VALUE: 18
PIF_VALUE: 15
PIF_VALUE: 3
PIF_VALUE: 16
PIF_VALUE: 16
PIF_VALUE: 18
PIF_VALUE: 18
PIF_VALUE: 16
PIF_VALUE: 15
PIF_VALUE: 20
PIF_VALUE: 15
PIF_VALUE: 16
PIF_VALUE: 16
PIF_VALUE: 29
PIF_VALUE: 16
PIF_VALUE: 17
PIF_VALUE: 20
PIF_VALUE: 15
PIF_VALUE: 16
PIF_VALUE: 18
PIF_VALUE: 16
PIF_VALUE: 17
PIF_VALUE: 2
PIF_VALUE: 16

## 2018-09-26 ASSESSMENT — LIFESTYLE VARIABLES: SMOKING_STATUS: 1

## 2018-09-26 ASSESSMENT — PAIN SCALES - GENERAL
PAINLEVEL_OUTOF10: 4
PAINLEVEL_OUTOF10: 2
PAINLEVEL_OUTOF10: 0
PAINLEVEL_OUTOF10: 7
PAINLEVEL_OUTOF10: 5
PAINLEVEL_OUTOF10: 6
PAINLEVEL_OUTOF10: 5
PAINLEVEL_OUTOF10: 8

## 2018-09-26 ASSESSMENT — PAIN DESCRIPTION - ORIENTATION
ORIENTATION: LEFT

## 2018-09-26 ASSESSMENT — PAIN DESCRIPTION - DESCRIPTORS
DESCRIPTORS: ACHING

## 2018-09-26 ASSESSMENT — PAIN DESCRIPTION - LOCATION
LOCATION: HIP

## 2018-09-26 ASSESSMENT — ENCOUNTER SYMPTOMS: SHORTNESS OF BREATH: 0

## 2018-09-26 ASSESSMENT — PAIN - FUNCTIONAL ASSESSMENT: PAIN_FUNCTIONAL_ASSESSMENT: 0-10

## 2018-09-26 ASSESSMENT — PAIN DESCRIPTION - ONSET
ONSET: ON-GOING

## 2018-09-26 ASSESSMENT — PAIN DESCRIPTION - PROGRESSION
CLINICAL_PROGRESSION: NOT CHANGED

## 2018-09-26 ASSESSMENT — PAIN DESCRIPTION - PAIN TYPE
TYPE: ACUTE PAIN
TYPE: ACUTE PAIN;SURGICAL PAIN
TYPE: ACUTE PAIN

## 2018-09-26 ASSESSMENT — PAIN DESCRIPTION - FREQUENCY
FREQUENCY: INTERMITTENT
FREQUENCY: CONTINUOUS
FREQUENCY: CONTINUOUS
FREQUENCY: INTERMITTENT

## 2018-09-26 NOTE — BRIEF OP NOTE
Brief Postoperative Note    Sarai Walker  YOB: 1955  [id number]    Pre-operative Diagnosis: Left femoral neck displaced fracture. Post-operative Diagnosis: Same    Procedure: Left hip hemiarthroplasty.     Anesthesia: General    Surgeons/Assistants: Lina/Jessica/Tabby    Estimated Blood Loss: 013     Complications: None    Specimens: Was Obtained: Left femoral head    Findings: same    Electronically signed by Mariaelena Huerta MD on 9/26/2018 at 8:57 AM

## 2018-09-26 NOTE — ANESTHESIA PRE PROCEDURE
Department of Anesthesiology  Preprocedure Note       Name:  Mitra Jensen   Age:  63 y.o.  :  1955                                          MRN:  [id number]         Date:  2018      Surgeon: Aristeo Chen):  Miracle Oliveira MD    Procedure: Procedure(s):  LEFT HIP HEMIARTHROPLASTY    Medications prior to admission:   Prior to Admission medications    Medication Sig Start Date End Date Taking? Authorizing Provider   methadone (DOLOPHINE) 10 MG tablet Take 10 mg by mouth daily. .   Yes Historical Provider, MD   oxyCODONE (ROXICODONE) 20 MG immediate release tablet Take 40-80 mg by mouth every 4 hours as needed for Pain. .   Yes Historical Provider, MD   mirtazapine (REMERON) 15 MG tablet TAKE 1 TABLET BY MOUTH NIGHTLY 18  Yes Keanu Gastelum MD   atorvastatin (LIPITOR) 20 MG tablet TAKE 1 TABLET BY MOUTH DAILY 5/15/18  Yes Kody Graham MD   nortriptyline (PAMELOR) 75 MG capsule TAKE 1 CAPSULE BY MOUTH NIGHTLY 18  Yes Keanu Gastelum MD   furosemide (LASIX) 20 MG tablet TAKE 1 TO 2 TABLETS DAILY AS NEEDED FOR LEG SWELLING. 18  Yes Keanu Gastelum MD   fentaNYL (DURAGESIC) 100 MCG/HR Place 1 patch onto the skin every 72 hours.    Yes Historical Provider, MD   tetracycline (ACHROMYCIN;SUMYCIN) 250 MG capsule Take 250 mg by mouth 2 times daily    Yes Historical Provider, MD   aspirin EC 81 MG EC tablet Take 1 tablet by mouth daily 3/31/17  Yes Kody Graham MD   sennosides-docusate sodium (SENOKOT-S) 8.6-50 MG tablet Take 2 tablets by mouth daily  Patient taking differently: Take 2 tablets by mouth daily as needed  16  Yes Keanu Gastelum MD   ondansetron (ZOFRAN) 8 MG tablet Take 1 tablet by mouth every 8 hours as needed for Nausea or Vomiting 16  Yes Amilcar Thorne MD   albuterol sulfate HFA (VENTOLIN HFA) 108 (90 Base) MCG/ACT inhaler Inhale 2 puffs into the lungs every 6 hours as needed for Wheezing 18   Keanu Gastelum MD   fluticasone-vilanterol [facility] 149/84   Pulse: 78 80 87 78   Resp: 16 16 16    Temp: 98.7 °F (37.1 °C) 98.3 °F (36.8 °C) 98.1 °F (36.7 °C) 98.2 °F (36.8 °C)   TempSrc: Oral Oral Oral Oral   SpO2:  90% 91% 92%   Weight:   133 lb 2.5 oz (60.4 kg)    Height:                                                  BP Readings from Last 3 Encounters:   09/26/18 (!) 149/84   08/23/18 132/70   08/21/18 120/70       NPO Status: Time of last liquid consumption: 0000                        Time of last solid consumption: 1600                        Date of last liquid consumption: 09/26/18 (midnight)                        Date of last solid food consumption: 09/25/18    BMI:   Wt Readings from Last 3 Encounters:   09/26/18 133 lb 2.5 oz (60.4 kg)   08/23/18 138 lb 12.5 oz (62.9 kg)   08/21/18 140 lb 6.4 oz (63.7 kg)     Body mass index is 22.16 kg/m². CBC:   Lab Results   Component Value Date    WBC 6.4 09/26/2018    RBC 2.69 09/26/2018    RBC 2.25 08/08/2017    HGB 8.8 09/26/2018    HCT 26.3 09/26/2018    MCV 97.6 09/26/2018    RDW 18.1 09/26/2018     09/26/2018       CMP:   Lab Results   Component Value Date     09/26/2018    K 3.8 09/26/2018     09/26/2018    CO2 26 09/26/2018    BUN 21 09/26/2018    CREATININE 1.2 09/26/2018    GFRAA 55 09/26/2018    GFRAA >60 04/25/2011    AGRATIO 0.6 09/24/2018    LABGLOM 46 09/26/2018    GLUCOSE 116 09/26/2018    GLUCOSE 107 08/08/2017    PROT 6.1 09/24/2018    PROT 5.8 08/08/2017    CALCIUM 8.0 09/26/2018    BILITOT 0.3 09/24/2018    ALKPHOS 258 09/24/2018    AST 16 09/24/2018    ALT 10 09/24/2018       POC Tests: No results for input(s): POCGLU, POCNA, POCK, POCCL, POCBUN, POCHEMO, POCHCT in the last 72 hours.     Coags:   Lab Results   Component Value Date    PROTIME 15.8 09/24/2018    INR 1.39 09/24/2018    APTT 37.0 09/24/2018       HCG (If Applicable): No results found for: PREGTESTUR, PREGSERUM, HCG, HCGQUANT     ABGs: No results found for: PHART, PO2ART, MVI5TQZ, TXX2BIJ, BEART, I9AQDZOE Type & Screen (If Applicable):  No results found for: LABABO, [address]    Anesthesia Evaluation  Patient summary reviewed no history of anesthetic complications:   Airway: Mallampati: II  TM distance: >3 FB   Neck ROM: full  Mouth opening: > = 3 FB Dental:          Pulmonary: breath sounds clear to auscultation  (+) asthma: current smoker    (-) shortness of breath, recent URI and sleep apnea          Patient did not smoke on day of surgery. Cardiovascular:    (+) hypertension:,     (-) valvular problems/murmurs, past MI, CAD, CABG/stent, dysrhythmias,  angina,  CHF and murmur      Rhythm: regular  Rate: normal                    Neuro/Psych:   (+) neuromuscular disease:, psychiatric history:depression/anxiety             GI/Hepatic/Renal:   (+) GERD:, liver disease:, renal disease (bilateral neph tubes):,           Endo/Other:    (+) blood dyscrasia: anemia:., malignancy/cancer (colon and cervical cancer with mets). Abdominal:           Vascular:   + PVD, aortic or cerebral, . Anesthesia Plan      general     ASA 4       Induction: intravenous. MIPS: Postoperative opioids intended and Prophylactic antiemetics administered. Anesthetic plan and risks discussed with patient. Use of blood products discussed with patient whom consented to blood products. Plan discussed with CRNA. This pre-anesthesia assessment may be used as a history and physical.    DOS STAFF ADDENDUM:    Pt seen and examined, chart reviewed (including anesthesia, drug and allergy history). No interval changes to history and physical examination. Anesthetic plan, risks, benefits, alternatives, and personnel involved discussed with patient. Patient verbalized an understanding and agrees to proceed.       Hyacinth Palomo MD  September 26, 2018  6:59 AM      Hyacinth Palomo MD   9/26/2018

## 2018-09-26 NOTE — CARE COORDINATION
Referral per MD for discharge planning. Chart reviewed; noted surgery this date. Will await therapy recommendations and follow up accordingly.    Electronically signed by Wendy Novak on 9/26/2018 at 12:57 PM   #[id number]

## 2018-09-26 NOTE — PROGRESS NOTES
Patient arouses to name, denies C/O pain or nausea. VSS. IV patent. Lefrt hip dressing dry and intact. Ice pack on. Neurovascular checks stable to LLE. Moving all extremities to command.

## 2018-09-26 NOTE — PROGRESS NOTES
Radiology here to do left hip X-Ray. Patient awake and alert. VSS. IV patent. Resp easy unlabored on 2L NC with SaO2 100%. Patient denies C/O pain or nausea. VSS. IV patent. Left hip dressing dry and intact with stable neurovascular checks to LLE. Moving all extremities to command. HOB up.

## 2018-09-26 NOTE — CARE COORDINATION
Received referral and reviewed records. Will follow to see how she does with therapy and arrange rehab admit if needed.

## 2018-09-26 NOTE — ANESTHESIA POSTPROCEDURE EVALUATION
Department of Anesthesiology  Postprocedure Note    Patient: Raven Flores  MRN: [id number]  YOB: 1955  Date of evaluation: 9/26/2018  Time:  10:00 AM     Procedure Summary     Date:  09/26/18 Room / Location:  [facility] OR 03 / [facility] OR    Anesthesia Start:  0729 Anesthesia Stop:  0917    Procedure:  LEFT HIP HEMIARTHROPLASTY (Left ) Diagnosis:  (HIP FRACTURE)    Surgeon:  Cena Litten, MD Responsible Provider:  Kristina Duong MD    Anesthesia Type:  general ASA Status:  4          Anesthesia Type: general    Inna Phase I: Inna Score: 8    Inna Phase II:      Last vitals: Reviewed and per EMR flowsheets.        Anesthesia Post Evaluation    Patient location during evaluation: PACU  Patient participation: complete - patient participated  Level of consciousness: awake and alert  Airway patency: patent  Nausea & Vomiting: no nausea and no vomiting  Complications: no  Cardiovascular status: hemodynamically stable  Respiratory status: acceptable  Hydration status: stable

## 2018-09-26 NOTE — PROGRESS NOTES
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 She had reportedly sustained a mechanical fall 1 week prior to presentation. The patient noted ongoing pain to her L hip since that time. The patient presented for further workup and management of L hip pain. In the ER, she was found to have a L femoral fracture and was admitted for further treatment. Ortho was consulted and the patient underwent surgical repair with L hip hemiarthroplasty on 9/26/18. Interval History/Subjective: No new complaints. Ortho surgery this am. UC + for two different GNR. Review of Systems:     Comprehensive ROS negative except as mentioned below.     _____________________________________________________________________  General ROS:  []  fevers  []  chills  []  fatigue  []  weakness  []  night sweats  []  body aches [] Other:  _____________________________________________________________________  HEENT ROS:  []  trauma  []  headache  []  visual changes  []  double vision  []  blurred vision  []  tinnitus  []  vertigo  []  ear ache  []  drainage  []  bleeding gums  []  hoarseness  []  voice change  []  difficult/painful swallowing  []  stuffiness  []  rhinorrhea  []  sneezing  []  epistaxis [] Other:  _____________________________________________________________________  Respiratory ROS:  []  cough  []  SOB  []  wheezing  []  changes in sputum production or quality  []  hemoptysis  []  pleurisy  []  snoring [] Other:  _____________________________________________________________________  Cardiovascular ROS:  [] palpitations  []  pain  []  ARIZMENDI  []  orthopnea  []  syncope  [] multiple sites ([facility] 2013    lung, liver    Colostomy status ([facility] 10/21/2011    due to rectovaginal fistula    History of blood transfusion     Humerus fracture 2018    right proximal    Liver disease     Nephrostomy status ([facility] 10/07/2016    bilateral percutaneous nephrostomies --hx of vesicovaginal fistulas    Pneumonia     Proctodynia     PVD (peripheral vascular disease) ([facility]     Radiation proctitis 2011    radiation for cervical cancer    Stress incontinence     Wears glasses     reading       Past Surgical History:        Procedure Laterality Date    BREAST BIOPSY      benign     SECTION  1981    Breech    COLONOSCOPY      COLOSTOMY  fall     due to rectovaginal fistula     CYSTOSCOPY  EUS    LEG SURGERY  03/15/2017    2 stents in right leg and 1 stent in left leg    NEPHROSTOMY Bilateral 2016    8 FR NEPH TUBES, DR. Yesenia Encinas    NEPHROSTOMY Bilateral 2018    Nephrostomy Tube placement; 10 Fr; Dr. Kamron Boyd NEPHROSTOMY Bilateral 2018    10F bilateral nephrostomy tubes exchanged by Dr. Marlon Dalal NEPHROSTOMY Bilateral 2018    RECTAL SURGERY  2014    adenocarcinoma    TOE AMPUTATION N/A 2017    4th toe right foot    TUNNELED VENOUS PORT PLACEMENT      VAGINA SURGERY  2016    Exam under anesthesia, vaginal washout    VASCULAR SURGERY      week of 3/13/17 by dr Bernarda Felix       Allergies: Avastin [bevacizumab]; Codeine; Hydrocodone-acetaminophen; Morphine; and Pcn [penicillins]    Past medical and surgical history reviewed. Any changes have been noted.      Scheduled and prn Medications:    Scheduled Meds:   sodium chloride flush  10 mL Intravenous 2 times per day    [START ON 2018] enoxaparin  40 mg Subcutaneous Daily    ceFAZolin (ANCEF) IVPB  2 g Intravenous Q8H    docusate sodium  100 mg Oral BID    metoprolol succinate  50 mg Oral Daily    lactobacillus  1 capsule Oral BID WC    aspirin EC  81 mg Oral (L) 09/26/2018    MCV 97.6 09/26/2018     09/26/2018    LABLYMP 1.0 08/08/2017    MID 0.5 08/08/2017    GRAN 2.6 08/08/2017    LYMPHOPCT 8.3 09/26/2018    MIDPERCENT 11.7 08/08/2017    GRANULOCYTES 64.1 08/08/2017    RBC 2.69 (L) 09/26/2018    MCH 32.6 09/26/2018    MCHC 33.3 09/26/2018    RDW 18.1 (H) 09/26/2018       Lab Results   Component Value Date    CREATININE 1.2 09/26/2018    BUN 21 (H) 09/26/2018     09/26/2018    K 3.8 09/26/2018     09/26/2018    CO2 26 09/26/2018       Lab Results   Component Value Date    MG 1.80 09/26/2018       Lab Results   Component Value Date    ALT 10 09/24/2018    AST 16 09/24/2018    ALKPHOS 258 (H) 09/24/2018    BILITOT 0.3 09/24/2018        No flowsheet data found. Imaging:  XR HIP LEFT (2-3 VIEWS)   Final Result   Postop changes without complicating feature. XR CHEST PORTABLE   Final Result   1. No acute cardiopulmonary abnormality. 2. Right lung nodules and mass in keeping with pulmonary metastatic disease. XR HIP LEFT (2-3 VIEWS)   Final Result   Angulated fracture of the left femoral neck. Assessment & Plan:      L femoral neck fracture  Ortho consulted. Appreciate recs  L hemiarthroplasty 9/26/18  PRN pain management  PT/OT   Placement to be determined after seen by PT/OT    Hypokalemia  Replace per protocol  Follow labs    Colorectal cancer s/p resection with colostomy  Pulmonary and liver mets  OP oncology f/u  Supportive care/ostomy care    Vesicovaginal fistula  2/2 malignancy? S/p bilateral percutaneous nephrostomy  Empiric abx due to concern for possible infection  UC + for GNR x 2. Will continue Zosyn and adjust as indicated by cx. Cervical CA  S/p radiation  OP f/u    COPD  Continue current medical regimen  Currently stable    Chronic pain syndrome  2/2 malignancy? Pain control    Body mass index is 22.16 kg/m².     The patient and / or the family were informed of the results of any tests, a time was given to answer questions, a plan was proposed and they agreed with plan.     DVT prophylaxis: [] Lovenox  [x] SQ Heparin  [] SCDs because of  [] warfarin/oral direct thrombin inhibitor [] Encourage ambulation    GI prophylaxis: [] PPI/A1jdectxy  [x] not indicated    Probiotic if on abx: [x] Yes [] No [] Not Indicated    Diet: Dietary Nutrition Supplements: Standard High Calorie Oral Supplement  DIET GENERAL;  Dietary Nutrition Supplements: Standard High Calorie Oral Supplement    Consults:  IP CONSULT TO HOSPITALIST  IP CONSULT TO ORTHOPEDIC SURGERY  IP CONSULT TO CARDIOLOGY  IP CONSULT TO SOCIAL WORK  IP CONSULT TO SOCIAL WORK  IP CONSULT TO SOCIAL WORK    Disposition:  [] Home  [] Home with home health [] Rehab [] Psych [] SNF  [] LTAC  [] Long term nursing home or group home [] Transfer to ICU  [] Transfer to PCU [x] Other: TBD once seen by PT/OT    Code Status: Full Code    ELOS: 1 - 2 days    Future Appointments  Date Time Provider Milena John   11/19/2018 10:50 AM MD EROS Perez  DOREEN Hutson NP  09/26/18

## 2018-09-26 NOTE — PROGRESS NOTES
Physical Therapy    Facility/Department: [address] ORTHOPEDICS  Initial Assessment    NAME: Rick Beckham  : 1955  MRN: [id number]    Date of Service: 2018      -good start  -OOB on to walker to the recliner day of surgery  -anticipate able to manage high frequency of sessions for eventual return to home      Discharge Recommendations:  Rick Beckham scored a 14/24 on the AM-PAC short mobility form. Current research shows that an AM-PAC score of 17 or less is typically not associated with a discharge to the patient's home setting. Based on the patients AM-PAC score and their current functional mobility deficits, it is recommended that the patient have 5-7 sessions per week of Physical Therapy at d/c to increase the patients independence. Patient Diagnosis(es): The primary encounter diagnosis was Closed fracture of neck of left femur, initial encounter ([address] Diagnoses of Acute cystitis with hematuria, Acute kidney injury ([address] Dehydration, Elevated troponin, and Immunocompromised state ([address] were also pertinent to this visit. 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 has a past surgical history that includes colostomy (fall ); Rectal surgery (2014);  section (); Colonoscopy; Cystocopy (EUS); Nephrostomy (Bilateral, 2016); Vagina surgery (2016);  Leg Surgery (03/15/2017); vascular surgery;

## 2018-09-26 NOTE — PROGRESS NOTES
Pt resting in bed NPO for surgery 730 am. Pt nephro tubes good output, no output in the colostomy.   Will cont to monitor

## 2018-09-26 NOTE — PROGRESS NOTES
Plan anticoag with heparin 3x a day as inpt then switch to Eliquis bid for 30 total days after discharge from hospital for DVT prophylaxis. Pt is \"high\" risk for DVT with hx cancer, hx DVT. Noted renal insufficency.

## 2018-09-26 NOTE — PROGRESS NOTES
Occupational Therapy   Occupational Therapy Initial Assessment  Date: 2018   Patient Name: Raven Flores  MRN: [id number]     : 1955    Date of Service: 2018    Assessment: Pt admitted after a fall at her sister's home resulting in Left femoral neck displaced fracture. She is now s/p left hip hemiarthroplasty on 18 and is WBAT with anterolateral hip precautions. Prior to admission, pt was independent with self-care and fxl mobility, although sister does report increased frequency of falls. Pt is currently below her baseline of independence, being most limited by decreased balance and activity tolerance secondary to hip surgery. Pt also limited by decreased cognition -- she appears slightly impulsive and had difficulty remembering her hip precautions this date. Pt will benefit from continued therapy while in the hospital in order to maximize function. Recommend continued therapy at d/c. Discharge Recommendations:  5-7 sessions per week, Patient would benefit from continued therapy after discharge     Raven Flores scored a 16/24 on the AM-PAC ADL Inpatient form. Current research shows that an AM-PAC score of 17 or less is typically not associated with a discharge to the patient's home setting. Based on the patients AM-PAC score and their current ADL deficits, it is recommended that the patient have 5-7 sessions per week of Occupational Therapy at d/c to increase the patients independence. Patient Diagnosis(es): The primary encounter diagnosis was Closed fracture of neck of left femur, initial encounter ([address] Diagnoses of Acute cystitis with hematuria, Acute kidney injury ([address] Dehydration, Elevated troponin, and Immunocompromised state ([address] were also pertinent to this visit. 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 Question carryover of hip precautions. Sensation  Overall Sensation Status: WFL      LUE AROM (degrees)  LUE AROM : WFL  RUE AROM (degrees)  RUE AROM : WFL  LUE Strength  Gross LUE Strength: WFL (not formally tested; appeared grossly WFL)  RUE Strength  Gross RUE Strength: WFL (not formally tested; appeared grossly [facility])       Assessment   Performance deficits / Impairments: Decreased functional mobility ; Decreased ADL status; Decreased ROM; Decreased safe awareness;Decreased endurance;Decreased balance  Assessment: Pt admitted after a fall at her sister's home resulting in Left femoral neck displaced fracture. She is now s/p left hip hemiarthroplasty on 9/26/18 and is WBAT with anterolateral hip precautions. Prior to admission, pt was independent with self-care and fxl mobility, although sister does report increased frequency of falls. Pt is currently below her baseline of independence, being most limited by decreased balance and activity tolerance secondary to hip surgery. Pt also limited by decreased cognition -- she appears slightly impulsive and had difficulty remembering her hip precautions this date. Pt will benefit from continued therapy while in the hospital in order to maximize function. Recommend continued therapy at d/c. Prognosis: Good  Decision Making: Medium Complexity  History: pt is from home where she lives with her daughter. she was independent with self-care and fxl mobility. she is now admitted after a fall at home resulting in Left femoral neck displaced fracture and is s/p left hip hemiarthroplasty.  she is WBAT with anterolateral hip precautions  Exam: decreased balance, decreased safety awareness, decreased activity tolerance, decreased recall of hip precautions  Assistance / Modification: Mod A bed mobility, Min A fxl transfers, CGA fxl mobility with RW. PMH includes: Colon cancer, cervical cancer, tobacco abuse, closed fracture of right proximal humerus  Patient Education: Role of OT, POC, hip precautions, transfer training  REQUIRES OT FOLLOW UP: Yes  Activity Tolerance  Activity Tolerance: Patient Tolerated treatment well  Safety Devices  Safety Devices in place: Yes (RN, Teresa Wilder, notified)  Type of devices: Call light within reach; Chair alarm in place; Left in chair;Nurse notified         Plan   Plan  Times per week: 3-5  Times per day: Daily  Current Treatment Recommendations: Strengthening, Endurance Training, Safety Education & Training, Self-Care / ADL, Cognitive Reorientation, Positioning, Equipment Evaluation, Education, & procurement, Patient/Caregiver Education & Training    G-Code  OT G-codes  Functional Assessment Tool Used: [facility] ADL   Score: 16  Functional Limitation: Self care  Self Care Current Status (): At least 40 percent but less than 60 percent impaired, limited or restricted  Self Care Goal Status ():  At least 20 percent but less than 40 percent impaired, limited or restricted    AM-PAC Score  AM-[facility] Inpatient Daily Activity Raw Score: 16  AM-PAC Inpatient ADL T-Scale Score : 35.96  ADL Inpatient CMS 0-100% Score: 53.32  ADL Inpatient CMS G-Code Modifier : CK    Goals  Short term goals  Time Frame for Short term goals: By d/c:  Short term goal 1: Pt will complete fxl mobility and fxl transfers to/from ADL surfaces with SBA and use of AD  Short term goal 2: Pt will complete toileting with Min A  Short term goal 3: Pt will complete LB bathing/dressing with Min A and use of AE prn  Short term goal 4: Pt will tolerate 3-5 minutes of standing functional activity with SBA  Short term goal 5: Pt will independently verbalize all anteriolateral hip precautions without cues  Patient Goals   Patient goals : \"to be able to walk\"       Therapy

## 2018-09-26 NOTE — PROGRESS NOTES
Patient admitted to PACU from OR. Patient asleep with oral airway intact. Resp easy unlabored on 4LNC with SaO2 100%. Monitor in SR. Left hip dressing dry and intact with ice pack on. Vascular checks stable to LLE. PAC to right upper chest accessed. Colostomy bag intact with pink stoma. Bilateral nephrostomy tubes intact patent to drainage bags with clear yellow urine noted in both bags. Pillow intact between legs. VSS.

## 2018-09-27 ENCOUNTER — HOSPITAL ENCOUNTER (INPATIENT)
Age: 63
LOS: 9 days | Discharge: HOME | DRG: 560 | End: 2018-10-06
Attending: PHYSICAL MEDICINE & REHABILITATION | Admitting: PHYSICAL MEDICINE & REHABILITATION
Payer: MEDICARE

## 2018-09-27 VITALS
TEMPERATURE: 98.3 F | BODY MASS INDEX: 22.19 KG/M2 | DIASTOLIC BLOOD PRESSURE: 71 MMHG | HEART RATE: 92 BPM | WEIGHT: 133.16 LBS | SYSTOLIC BLOOD PRESSURE: 134 MMHG | RESPIRATION RATE: 18 BRPM | HEIGHT: 65 IN | OXYGEN SATURATION: 91 %

## 2018-09-27 DIAGNOSIS — C18.7 MALIGNANT NEOPLASM OF SIGMOID COLON (HCC): Primary | ICD-10-CM

## 2018-09-27 DIAGNOSIS — S72.002D CLOSED FRACTURE OF NECK OF LEFT FEMUR WITH ROUTINE HEALING, SUBSEQUENT ENCOUNTER: ICD-10-CM

## 2018-09-27 LAB
ANION GAP SERPL CALCULATED.3IONS-SCNC: 11 MMOL/L (ref 3–16)
BASOPHILS ABSOLUTE: 0 K/UL (ref 0–0.2)
BASOPHILS RELATIVE PERCENT: 0.2 %
BLOOD BANK DISPENSE STATUS: NORMAL
BLOOD BANK PRODUCT CODE: NORMAL
BPU ID: NORMAL
BUN BLDV-MCNC: 21 MG/DL (ref 7–20)
CALCIUM SERPL-MCNC: 7.8 MG/DL (ref 8.3–10.6)
CHLORIDE BLD-SCNC: 101 MMOL/L (ref 99–110)
CO2: 24 MMOL/L (ref 21–32)
CREAT SERPL-MCNC: 1.3 MG/DL (ref 0.6–1.2)
DESCRIPTION BLOOD BANK: NORMAL
EKG ATRIAL RATE: 416 BPM
EKG DIAGNOSIS: NORMAL
EKG Q-T INTERVAL: 372 MS
EKG QRS DURATION: 88 MS
EKG QTC CALCULATION (BAZETT): 494 MS
EKG R AXIS: 4 DEGREES
EKG T AXIS: 39 DEGREES
EKG VENTRICULAR RATE: 106 BPM
EOSINOPHILS ABSOLUTE: 0 K/UL (ref 0–0.6)
EOSINOPHILS RELATIVE PERCENT: 0.2 %
GFR AFRICAN AMERICAN: 50
GFR NON-AFRICAN AMERICAN: 42
GLUCOSE BLD-MCNC: 105 MG/DL (ref 70–99)
HCT VFR BLD CALC: 23.9 % (ref 36–48)
HEMOGLOBIN: 7.8 G/DL (ref 12–16)
LYMPHOCYTES ABSOLUTE: 0.5 K/UL (ref 1–5.1)
LYMPHOCYTES RELATIVE PERCENT: 6.2 %
MAGNESIUM: 1.8 MG/DL (ref 1.8–2.4)
MCH RBC QN AUTO: 32.3 PG (ref 26–34)
MCHC RBC AUTO-ENTMCNC: 32.6 G/DL (ref 31–36)
MCV RBC AUTO: 99.2 FL (ref 80–100)
MONOCYTES ABSOLUTE: 0.8 K/UL (ref 0–1.3)
MONOCYTES RELATIVE PERCENT: 10.7 %
NEUTROPHILS ABSOLUTE: 6.5 K/UL (ref 1.7–7.7)
NEUTROPHILS RELATIVE PERCENT: 82.7 %
ORGANISM: ABNORMAL
ORGANISM: ABNORMAL
PDW BLD-RTO: 17.9 % (ref 12.4–15.4)
PLATELET # BLD: 187 K/UL (ref 135–450)
PMV BLD AUTO: 7.7 FL (ref 5–10.5)
POTASSIUM REFLEX MAGNESIUM: 3.5 MMOL/L (ref 3.5–5.1)
RBC # BLD: 2.41 M/UL (ref 4–5.2)
SODIUM BLD-SCNC: 136 MMOL/L (ref 136–145)
URINE CULTURE, ROUTINE: ABNORMAL
WBC # BLD: 7.8 K/UL (ref 4–11)

## 2018-09-27 PROCEDURE — 83735 ASSAY OF MAGNESIUM: CPT

## 2018-09-27 PROCEDURE — 80048 BASIC METABOLIC PNL TOTAL CA: CPT

## 2018-09-27 PROCEDURE — 6370000000 HC RX 637 (ALT 250 FOR IP): Performed by: INTERNAL MEDICINE

## 2018-09-27 PROCEDURE — 2580000003 HC RX 258: Performed by: NURSE PRACTITIONER

## 2018-09-27 PROCEDURE — 6370000000 HC RX 637 (ALT 250 FOR IP): Performed by: PHYSICAL MEDICINE & REHABILITATION

## 2018-09-27 PROCEDURE — 2580000003 HC RX 258: Performed by: ORTHOPAEDIC SURGERY

## 2018-09-27 PROCEDURE — 6360000002 HC RX W HCPCS: Performed by: PHYSICAL MEDICINE & REHABILITATION

## 2018-09-27 PROCEDURE — 2580000003 HC RX 258: Performed by: INTERNAL MEDICINE

## 2018-09-27 PROCEDURE — 6370000000 HC RX 637 (ALT 250 FOR IP): Performed by: NURSE PRACTITIONER

## 2018-09-27 PROCEDURE — 6370000000 HC RX 637 (ALT 250 FOR IP): Performed by: ORTHOPAEDIC SURGERY

## 2018-09-27 PROCEDURE — 86923 COMPATIBILITY TEST ELECTRIC: CPT

## 2018-09-27 PROCEDURE — 6360000002 HC RX W HCPCS: Performed by: NURSE PRACTITIONER

## 2018-09-27 PROCEDURE — 1280000000 HC REHAB R&B

## 2018-09-27 PROCEDURE — 85025 COMPLETE CBC W/AUTO DIFF WBC: CPT

## 2018-09-27 PROCEDURE — 36430 TRANSFUSION BLD/BLD COMPNT: CPT

## 2018-09-27 PROCEDURE — P9016 RBC LEUKOCYTES REDUCED: HCPCS

## 2018-09-27 PROCEDURE — 6360000002 HC RX W HCPCS: Performed by: INTERNAL MEDICINE

## 2018-09-27 RX ORDER — SENNA AND DOCUSATE SODIUM 50; 8.6 MG/1; MG/1
1 TABLET, FILM COATED ORAL 2 TIMES DAILY
Status: DISCONTINUED | OUTPATIENT
Start: 2018-09-27 | End: 2018-10-06 | Stop reason: HOSPADM

## 2018-09-27 RX ORDER — OXYCODONE HYDROCHLORIDE 10 MG/1
40 TABLET ORAL EVERY 4 HOURS PRN
Status: CANCELLED | OUTPATIENT
Start: 2018-09-27

## 2018-09-27 RX ORDER — SENNA AND DOCUSATE SODIUM 50; 8.6 MG/1; MG/1
2 TABLET, FILM COATED ORAL DAILY
Status: CANCELLED | OUTPATIENT
Start: 2018-09-28

## 2018-09-27 RX ORDER — ASPIRIN 81 MG/1
81 TABLET ORAL DAILY
Status: CANCELLED | OUTPATIENT
Start: 2018-09-28

## 2018-09-27 RX ORDER — POTASSIUM CHLORIDE 20 MEQ/1
20 TABLET, EXTENDED RELEASE ORAL
Status: DISCONTINUED | OUTPATIENT
Start: 2018-09-28 | End: 2018-10-02

## 2018-09-27 RX ORDER — OXYCODONE HYDROCHLORIDE 10 MG/1
40 TABLET ORAL EVERY 4 HOURS PRN
Status: DISCONTINUED | OUTPATIENT
Start: 2018-09-27 | End: 2018-10-06 | Stop reason: HOSPADM

## 2018-09-27 RX ORDER — METHADONE HYDROCHLORIDE 10 MG/1
10 TABLET ORAL DAILY
Status: CANCELLED | OUTPATIENT
Start: 2018-09-28

## 2018-09-27 RX ORDER — METOPROLOL SUCCINATE 50 MG/1
50 TABLET, EXTENDED RELEASE ORAL DAILY
Status: CANCELLED | OUTPATIENT
Start: 2018-09-28

## 2018-09-27 RX ORDER — NORTRIPTYLINE HYDROCHLORIDE 25 MG/1
75 CAPSULE ORAL NIGHTLY
Status: CANCELLED | OUTPATIENT
Start: 2018-09-27

## 2018-09-27 RX ORDER — FENTANYL 100 UG/H
1 PATCH TRANSDERMAL
Status: DISCONTINUED | OUTPATIENT
Start: 2018-09-30 | End: 2018-10-06 | Stop reason: HOSPADM

## 2018-09-27 RX ORDER — HEPARIN SODIUM 5000 [USP'U]/ML
5000 INJECTION, SOLUTION INTRAVENOUS; SUBCUTANEOUS EVERY 8 HOURS SCHEDULED
Status: CANCELLED | OUTPATIENT
Start: 2018-09-27

## 2018-09-27 RX ORDER — ACETAMINOPHEN 325 MG/1
650 TABLET ORAL EVERY 4 HOURS PRN
Status: CANCELLED | OUTPATIENT
Start: 2018-09-27

## 2018-09-27 RX ORDER — 0.9 % SODIUM CHLORIDE 0.9 %
250 INTRAVENOUS SOLUTION INTRAVENOUS ONCE
Status: COMPLETED | OUTPATIENT
Start: 2018-09-27 | End: 2018-09-27

## 2018-09-27 RX ORDER — ACETAMINOPHEN 325 MG/1
650 TABLET ORAL EVERY 4 HOURS PRN
Status: DISCONTINUED | OUTPATIENT
Start: 2018-09-27 | End: 2018-10-06 | Stop reason: HOSPADM

## 2018-09-27 RX ORDER — B-COMPLEX WITH VITAMIN C
1 TABLET ORAL 2 TIMES DAILY
Status: CANCELLED | OUTPATIENT
Start: 2018-09-27

## 2018-09-27 RX ORDER — FAMOTIDINE 20 MG/1
20 TABLET, FILM COATED ORAL DAILY PRN
Status: DISCONTINUED | OUTPATIENT
Start: 2018-09-27 | End: 2018-10-06 | Stop reason: HOSPADM

## 2018-09-27 RX ORDER — DOCUSATE SODIUM 100 MG/1
100 CAPSULE, LIQUID FILLED ORAL 2 TIMES DAILY
Status: DISCONTINUED | OUTPATIENT
Start: 2018-09-27 | End: 2018-09-27 | Stop reason: SDUPTHER

## 2018-09-27 RX ORDER — SENNA AND DOCUSATE SODIUM 50; 8.6 MG/1; MG/1
1 TABLET, FILM COATED ORAL 2 TIMES DAILY
Status: CANCELLED | OUTPATIENT
Start: 2018-09-27

## 2018-09-27 RX ORDER — FAMOTIDINE 20 MG/1
20 TABLET, FILM COATED ORAL DAILY PRN
Status: CANCELLED | OUTPATIENT
Start: 2018-09-27

## 2018-09-27 RX ORDER — NICOTINE 21 MG/24HR
1 PATCH, TRANSDERMAL 24 HOURS TRANSDERMAL DAILY PRN
Qty: 30 PATCH | Refills: 0 | Status: ON HOLD | OUTPATIENT
Start: 2018-09-27 | End: 2018-10-05 | Stop reason: HOSPADM

## 2018-09-27 RX ORDER — DOCUSATE SODIUM 100 MG/1
100 CAPSULE, LIQUID FILLED ORAL 2 TIMES DAILY
Status: CANCELLED | OUTPATIENT
Start: 2018-09-27

## 2018-09-27 RX ORDER — POTASSIUM CHLORIDE 20 MEQ/1
20 TABLET, EXTENDED RELEASE ORAL
Status: CANCELLED | OUTPATIENT
Start: 2018-09-28

## 2018-09-27 RX ORDER — SENNA AND DOCUSATE SODIUM 50; 8.6 MG/1; MG/1
2 TABLET, FILM COATED ORAL DAILY
Status: DISCONTINUED | OUTPATIENT
Start: 2018-09-28 | End: 2018-09-27 | Stop reason: SDUPTHER

## 2018-09-27 RX ORDER — POLYETHYLENE GLYCOL 3350 17 G/17G
17 POWDER, FOR SOLUTION ORAL DAILY
Status: CANCELLED | OUTPATIENT
Start: 2018-09-27

## 2018-09-27 RX ORDER — BISACODYL 10 MG
10 SUPPOSITORY, RECTAL RECTAL DAILY PRN
Status: DISCONTINUED | OUTPATIENT
Start: 2018-09-27 | End: 2018-10-06 | Stop reason: HOSPADM

## 2018-09-27 RX ORDER — NICOTINE 21 MG/24HR
1 PATCH, TRANSDERMAL 24 HOURS TRANSDERMAL DAILY PRN
Status: DISCONTINUED | OUTPATIENT
Start: 2018-09-27 | End: 2018-10-06 | Stop reason: HOSPADM

## 2018-09-27 RX ORDER — NORTRIPTYLINE HYDROCHLORIDE 25 MG/1
75 CAPSULE ORAL NIGHTLY
Status: DISCONTINUED | OUTPATIENT
Start: 2018-09-27 | End: 2018-10-06 | Stop reason: HOSPADM

## 2018-09-27 RX ORDER — FENTANYL 100 UG/H
1 PATCH TRANSDERMAL
Status: CANCELLED | OUTPATIENT
Start: 2018-09-30

## 2018-09-27 RX ORDER — METOPROLOL SUCCINATE 50 MG/1
50 TABLET, EXTENDED RELEASE ORAL DAILY
Status: DISCONTINUED | OUTPATIENT
Start: 2018-09-28 | End: 2018-10-06 | Stop reason: HOSPADM

## 2018-09-27 RX ORDER — LACTOBACILLUS RHAMNOSUS GG 10B CELL
1 CAPSULE ORAL 2 TIMES DAILY WITH MEALS
Status: CANCELLED | OUTPATIENT
Start: 2018-09-27

## 2018-09-27 RX ORDER — MIRTAZAPINE 15 MG/1
15 TABLET, FILM COATED ORAL NIGHTLY
Status: CANCELLED | OUTPATIENT
Start: 2018-09-27

## 2018-09-27 RX ORDER — POLYETHYLENE GLYCOL 3350 17 G/17G
17 POWDER, FOR SOLUTION ORAL DAILY
Status: DISCONTINUED | OUTPATIENT
Start: 2018-09-28 | End: 2018-10-06 | Stop reason: HOSPADM

## 2018-09-27 RX ORDER — ONDANSETRON 4 MG/1
4 TABLET, FILM COATED ORAL EVERY 8 HOURS PRN
Status: DISCONTINUED | OUTPATIENT
Start: 2018-09-27 | End: 2018-10-06 | Stop reason: HOSPADM

## 2018-09-27 RX ORDER — HEPARIN SODIUM 5000 [USP'U]/ML
5000 INJECTION, SOLUTION INTRAVENOUS; SUBCUTANEOUS EVERY 8 HOURS SCHEDULED
Status: DISCONTINUED | OUTPATIENT
Start: 2018-09-27 | End: 2018-10-06 | Stop reason: HOSPADM

## 2018-09-27 RX ORDER — B-COMPLEX WITH VITAMIN C
1 TABLET ORAL 2 TIMES DAILY
Status: DISCONTINUED | OUTPATIENT
Start: 2018-09-27 | End: 2018-10-06 | Stop reason: HOSPADM

## 2018-09-27 RX ORDER — LACTOBACILLUS RHAMNOSUS GG 10B CELL
1 CAPSULE ORAL 2 TIMES DAILY WITH MEALS
Status: DISCONTINUED | OUTPATIENT
Start: 2018-09-28 | End: 2018-10-06 | Stop reason: HOSPADM

## 2018-09-27 RX ORDER — ATORVASTATIN CALCIUM 20 MG/1
20 TABLET, FILM COATED ORAL NIGHTLY
Status: CANCELLED | OUTPATIENT
Start: 2018-09-27

## 2018-09-27 RX ORDER — METHADONE HYDROCHLORIDE 10 MG/1
10 TABLET ORAL DAILY
Status: DISCONTINUED | OUTPATIENT
Start: 2018-09-28 | End: 2018-10-06 | Stop reason: HOSPADM

## 2018-09-27 RX ORDER — BISACODYL 10 MG
10 SUPPOSITORY, RECTAL RECTAL DAILY PRN
Status: CANCELLED | OUTPATIENT
Start: 2018-09-27

## 2018-09-27 RX ORDER — MIRTAZAPINE 15 MG/1
15 TABLET, FILM COATED ORAL NIGHTLY
Status: DISCONTINUED | OUTPATIENT
Start: 2018-09-27 | End: 2018-10-06 | Stop reason: HOSPADM

## 2018-09-27 RX ORDER — METOPROLOL SUCCINATE 50 MG/1
50 TABLET, EXTENDED RELEASE ORAL DAILY
Qty: 30 TABLET | Refills: 0 | Status: SHIPPED | OUTPATIENT
Start: 2018-09-28 | End: 2018-12-12 | Stop reason: ALTCHOICE

## 2018-09-27 RX ORDER — ASPIRIN 81 MG/1
81 TABLET ORAL DAILY
Status: DISCONTINUED | OUTPATIENT
Start: 2018-09-28 | End: 2018-10-06 | Stop reason: HOSPADM

## 2018-09-27 RX ORDER — ATORVASTATIN CALCIUM 20 MG/1
20 TABLET, FILM COATED ORAL NIGHTLY
Status: DISCONTINUED | OUTPATIENT
Start: 2018-09-27 | End: 2018-10-06 | Stop reason: HOSPADM

## 2018-09-27 RX ORDER — ONDANSETRON 4 MG/1
4 TABLET, FILM COATED ORAL EVERY 8 HOURS PRN
Status: CANCELLED | OUTPATIENT
Start: 2018-09-27

## 2018-09-27 RX ORDER — NICOTINE 21 MG/24HR
1 PATCH, TRANSDERMAL 24 HOURS TRANSDERMAL DAILY PRN
Status: CANCELLED | OUTPATIENT
Start: 2018-09-27

## 2018-09-27 RX ADMIN — SODIUM CHLORIDE 250 ML: 9 INJECTION, SOLUTION INTRAVENOUS at 11:46

## 2018-09-27 RX ADMIN — DOCUSATE SODIUM AND SENNOSIDES 2 TABLET: 8.6; 5 TABLET, FILM COATED ORAL at 08:39

## 2018-09-27 RX ADMIN — OXYCODONE HYDROCHLORIDE 40 MG: 10 TABLET ORAL at 10:47

## 2018-09-27 RX ADMIN — CEFTRIAXONE 1 G: 1 INJECTION, POWDER, FOR SOLUTION INTRAMUSCULAR; INTRAVENOUS at 08:54

## 2018-09-27 RX ADMIN — FAMOTIDINE 20 MG: 20 TABLET, FILM COATED ORAL at 08:39

## 2018-09-27 RX ADMIN — SENNOSIDES AND DOCUSATE SODIUM 1 TABLET: 8.6; 5 TABLET ORAL at 20:54

## 2018-09-27 RX ADMIN — METHADONE HYDROCHLORIDE 10 MG: 10 TABLET ORAL at 08:39

## 2018-09-27 RX ADMIN — CALCIUM CARBONATE-VITAMIN D TAB 500 MG-200 UNIT 1 TABLET: 500-200 TAB at 20:54

## 2018-09-27 RX ADMIN — HEPARIN SODIUM 5000 UNITS: 5000 INJECTION INTRAVENOUS; SUBCUTANEOUS at 06:14

## 2018-09-27 RX ADMIN — ATORVASTATIN CALCIUM 20 MG: 20 TABLET, FILM COATED ORAL at 20:54

## 2018-09-27 RX ADMIN — OXYCODONE HYDROCHLORIDE 40 MG: 10 TABLET ORAL at 20:57

## 2018-09-27 RX ADMIN — NORTRIPTYLINE HYDROCHLORIDE 75 MG: 25 CAPSULE ORAL at 21:52

## 2018-09-27 RX ADMIN — HEPARIN SODIUM 5000 UNITS: 5000 INJECTION INTRAVENOUS; SUBCUTANEOUS at 20:58

## 2018-09-27 RX ADMIN — Medication 1 CAPSULE: at 08:39

## 2018-09-27 RX ADMIN — Medication 400 MG: at 20:54

## 2018-09-27 RX ADMIN — HEPARIN SODIUM 5000 UNITS: 5000 INJECTION INTRAVENOUS; SUBCUTANEOUS at 14:48

## 2018-09-27 RX ADMIN — OXYCODONE HYDROCHLORIDE 40 MG: 10 TABLET ORAL at 14:49

## 2018-09-27 RX ADMIN — PIPERACILLIN SODIUM,TAZOBACTAM SODIUM 3.38 G: 3; .375 INJECTION, POWDER, FOR SOLUTION INTRAVENOUS at 00:48

## 2018-09-27 RX ADMIN — OXYCODONE HYDROCHLORIDE 40 MG: 10 TABLET ORAL at 00:54

## 2018-09-27 RX ADMIN — ASPIRIN 81 MG: 81 TABLET, COATED ORAL at 08:39

## 2018-09-27 RX ADMIN — Medication 1 CAPSULE: at 16:45

## 2018-09-27 RX ADMIN — MIRTAZAPINE 15 MG: 15 TABLET, FILM COATED ORAL at 20:54

## 2018-09-27 RX ADMIN — Medication 10 ML: at 08:41

## 2018-09-27 RX ADMIN — DOCUSATE SODIUM 100 MG: 100 CAPSULE, LIQUID FILLED ORAL at 08:39

## 2018-09-27 ASSESSMENT — PAIN DESCRIPTION - LOCATION: LOCATION: HIP

## 2018-09-27 ASSESSMENT — PAIN SCALES - GENERAL
PAINLEVEL_OUTOF10: 6
PAINLEVEL_OUTOF10: 8
PAINLEVEL_OUTOF10: 6
PAINLEVEL_OUTOF10: 7
PAINLEVEL_OUTOF10: 0
PAINLEVEL_OUTOF10: 7
PAINLEVEL_OUTOF10: 0
PAINLEVEL_OUTOF10: 0
PAINLEVEL_OUTOF10: 6
PAINLEVEL_OUTOF10: 8

## 2018-09-27 ASSESSMENT — PAIN DESCRIPTION - DESCRIPTORS: DESCRIPTORS: ACHING

## 2018-09-27 ASSESSMENT — PAIN DESCRIPTION - ONSET: ONSET: ON-GOING

## 2018-09-27 ASSESSMENT — PAIN DESCRIPTION - FREQUENCY: FREQUENCY: INTERMITTENT

## 2018-09-27 ASSESSMENT — PAIN DESCRIPTION - PAIN TYPE: TYPE: SURGICAL PAIN

## 2018-09-27 ASSESSMENT — PAIN DESCRIPTION - ORIENTATION: ORIENTATION: LEFT

## 2018-09-27 NOTE — PROGRESS NOTES
Patient discharged with belongings over to inpatient rehab. Gave report to Blade Vallejo. Port remains accessed for possible use in rehab.   Electronically signed by Silverio Pollack RN on 9/27/2018 at 5:34 PM

## 2018-09-27 NOTE — OP NOTE
[address] [place], [place] [address]                                 OPERATIVE REPORT    PATIENT NAME: Anurag Elias                     :        1955  MED REC NO:   [id number]                          ROOM:       3101  ACCOUNT NO:   [de-identified]                           ADMIT DATE: 2018  PROVIDER:     Juventino Bahena MD    DATE OF PROCEDURE:  2018    PRIMARY CARE PHYSICIAN:  Daniele Guillen MD    PREOPERATIVE DIAGNOSIS:  Left hip displaced femoral neck fracture. POSTOPERATIVE DIAGNOSIS:  Left hip displaced femoral neck fracture. OPERATION PERFORMED:  Open treatment of left hip displaced femoral neck  fracture with bipolar press-fit hemiarthroplasty. SURGEON:  Juventino Bahena MD    ASSISTANT:  [place], Surgical [facility]istants. ANESTHESIA:  General anesthesia. ESTIMATED BLOOD LOSS:  300 mL. COMPLICATIONS:  None. APPROACH:  Anterolateral approach. IMPLANTS USED:  Ashok Accolade TMZF Plus, stem size #3.5 with head size  46 mm, bipolar, -3 offset. INDICATIONS:  This is a 68-year-old white female with multiple  comorbidities and multiple metastatic cancer, who sustained a fall with the  left hip pain. She sustained a fall about two weeks ago. She was able to  ambulate with pain using a walker, but the pain suddenly increased and she  was then brought by EMS to [facility] where she was found to have  a displaced left femoral neck fracture. At this point, the patient is  stable for surgery. All risks, benefits, and alternatives were discussed  with the patient, and she agreed to proceed with surgical treatment. OPERATIVE PROCEDURE:  The patient's left hip was marked. She received 900  mg of clindamycin IV preoperatively. The patient was then brought to the  operating room and underwent general anesthesia.   The patient was then  placed in the right lateral decubitus position with the left hip up. The  left hip and lower extremity were then prepped and draped in regular  sterile routine fashion. A time-out was called, confirming the patient's  name, site, and procedure. We used an anterolateral approach. An incision was made over the lateral  aspect of the left hip. The fascia kenrick was incised at the length of the  incision. We then elevated the anterior third of the gluteus medius and  vastus lateralis using the cautery. That was taken down, elevating the  gluteus minimus, and capsulotomy was performed. The fracture was then  exposed. We then used a saw to cut off the femoral neck at the appropriate  height. We then prepared the proximal femur with a , canal  finder, lateralizing reamers, and sequential broaching was performed and  3.5 stem was found to be appropriate size. At this point, we removed the femoral head and measured 46 mm. We  irrigated the incision copiously with Pulsavac with gentamicin. We then  inserted the stem, which was Accolade TMZF Plus, stem size #3.5 with good  press-fit. We elected to proceed with -3 offset bipolar 46 mm head, which  was placed then the hip joint relocated without significant  difficulty. It was tested intraoperatively and was found to be stable up  to 90 degrees of external rotation. We irrigated the hip joint copiously with the Pulsavac with normal saline  mixed with gentamicin. We closed the capsule with a #2 Ethibond. We then  repaired the vastus lateralis, gluteus medius and minimus as one slab back  to the greater trochanter using drill hole and #5 Ti-Cron. We enforced the  repair with #2 Ethibond and #1 Vicryl. The fascia kenrick was then closed  with a #2 Jullie Sas in a running fashion. The subcu was then irrigated with  normal saline using the Pulsavac and was closed with 2-0 and 3-0 Vicryl and  the skin with a 4-0 Monocryl. Steri-Strips were then applied.   Dressing  was then applied in the form of Mepilex. The patient tolerated the procedure well and was taken to the recovery in  stable condition. POSTOPERATIVE PLAN:  The patient will be readmitted as an inpatient. We  will start PT and OT with weightbearing as tolerated. She will need  replacement.         Aida Kendrick MD    D: 09/26/2018 11:25:31       T: 09/26/2018 12:56:17     [email or web address]  Job#: [id number]     Doc#: [id number]    CC:  Otoniel Huston MD

## 2018-09-27 NOTE — PROGRESS NOTES
Pt A&O. AM meds completed. Pt assisted up to chair x1 CGA with walker. Pt to receive 1 unit of blood later today. Dressing to L hip remains clean, dry, intact. Will monitor.  Electronically signed by Cameron Alston RN on 9/27/2018 at 8:55 AM

## 2018-09-27 NOTE — PLAN OF CARE
ADVANCED CARE PLANNING    Macrina Jackson       :  1955              MRN:  [id number]      Purpose of Encounter: Advanced care planning in light of left femoral neck fracture. Parties in attendance: :Stefanie Wang MD, Family members:sister. Decisional Capacity:Yes    Subjective/Patient Story: Patient understands that her health function continues to deteriorate. 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     Patient reports that she had a mechanical fall about 1 week prior to emergency room visit. She have had ongoing pain on her left hip since that time. She decided to seek help due to concern.     Workup in the emergency room includes imaging that was concerning for left femoral fracture with patient being admitted to the hospital for further workup and management.   .    Goals of Care Determinations: Patient wishes to focus on treatment and return to home. Plan: Will notify Marie Marley MD of change in care plan. Will look at further interventions as needed. Code Status: At this time patient wishes to be Prior    Time Spent on Advanced Planning Documents: 30+ minutes    Advanced Care Planning Documents: Completed advances directives, advanced directives in chart. Electronically signed by Otilio Castillo MD on 2018 at 5:47 PM  Thank you Marie Marley MD for the opportunity to be involved in this patient's care. If you have any questions or concerns please feel free to contact me at [phone].
Problem: Falls - Risk of:  Goal: Will remain free from falls  Will remain free from falls   Outcome: Ongoing  Fall risk assessment completed . Fall precautions in place, bed alarm on, side rails 2/4 up, call light in reach, educated pt on calling for assistance when needed, room clear of clutter. Pt verbalized understanding. Electronically signed by Walker Reina RN on 9/26/2018 at 8:17 PM      Problem: Risk for Impaired Skin Integrity  Goal: Tissue integrity - skin and mucous membranes  Structural intactness and normal physiological function of skin and  mucous membranes. Outcome: Ongoing  Pete score assessed. Patient able to ambulate and turn self. Repositioned patient Q2H and assessed skin. Educated patient on importance of repositioning to prevent skin issues. Electronically signed by Walker Reina RN on 9/26/2018 at 8:18 PM      Problem: Pain:  Goal: Pain level will decrease  Pain level will decrease    Outcome: Ongoing  Pain /discomfort being managed with PRN analgesics per MD orders. Patient able to express presence and absence of pain and rate pain appropriately using numerical scale. Electronically signed by Walker Reina RN on 9/26/2018 at 8:18 PM      Problem: Infection - Surgical Site:  Goal: Will show no infection signs and symptoms  Will show no infection signs and symptoms   Outcome: Ongoing  Patient is alert and oriented, afebrile, has no complaints of pain, skin is intact and appropriate for ethnicity in color  Electronically signed by Walker Reina RN on 9/26/2018 at 8:18 PM      Problem: Pain - Acute:  Goal: Pain level will decrease  Pain level will decrease    Outcome: Ongoing  Pain /discomfort being managed with PRN analgesics per MD orders. Patient able to express presence and absence of pain and rate pain appropriately using numerical scale.    Electronically signed by Walker Reina RN on 9/26/2018 at 8:18 PM
Problem: Falls - Risk of:  Goal: Will remain free from falls  Will remain free from falls   Outcome: Ongoing  Fall risk assessment completed. Fall precautions in place. Call light within reach. Pt educated on calling for assistance before getting up. Walkway free of clutter. Will continue to monitor. Goal: Absence of physical injury  Absence of physical injury   Outcome: Ongoing  Fall risk assessment completed. Fall precautions in place. Call light within reach. Pt educated on calling for assistance before getting up. Walkway free of clutter. Will continue to monitor. Problem: Risk for Impaired Skin Integrity  Goal: Tissue integrity - skin and mucous membranes  Structural intactness and normal physiological function of skin and  mucous membranes. Outcome: Ongoing  Skin assessment completed every shift. Pt assessed for incontinence, appropriate barrier cream applied prn. Pt encouraged to turn/rotate every 2 hours. Assistance provided if pt unable to do so themselves.
Problem: Falls - Risk of:  Goal: Will remain free from falls  Will remain free from falls   Outcome: Ongoing  Fall risk assessment completed. Fall precautions in place. Call light within reach. Pt educated on calling for assistance before getting up. Walkway free of clutter. Will continue to monitor. Goal: Absence of physical injury  Absence of physical injury   Outcome: Ongoing  Pt is free of injury. No injury noted. Fall precautions in place. Call light within reach. Will monitor. Problem: Risk for Impaired Skin Integrity  Goal: Tissue integrity - skin and mucous membranes  Structural intactness and normal physiological function of skin and  mucous membranes. Outcome: Ongoing  Will monitor skin and mucous members. Will turn patient every 2 hours, monitor for friction and sheering, and change dressings as needed. Will preform skin assessment every shift. Problem: Pain:  Goal: Control of acute pain  Control of acute pain   Outcome: Ongoing  Patient educated on acute pain. Taught patient to use call light to ask for pain medication. PRN pain medication given for acute pain. Will continue to monitor pain per unit protocol. Problem: Infection - Surgical Site:  Goal: Will show no infection signs and symptoms  Will show no infection signs and symptoms   Outcome: Ongoing  Pt is free of signs and symptoms of infection. Incision and dressing are clean, dry and intact. Vital signs stable. Will monitor.
Problem: Pain:  Goal: Pain level will decrease  Pain level will decrease    Outcome: Ongoing    Goal: Control of acute pain  Control of acute pain   Outcome: Ongoing      Problem: Pain - Acute:  Goal: Pain level will decrease  Pain level will decrease    Outcome: Ongoing
Problem: Pain:  Goal: Pain level will decrease  Pain level will decrease  Outcome: Ongoing  Pain/discomfort being managed with PRN analgesics per MD orders. Pt able to express presence and absence of pain and rate pain appropriately using numerical scale. Goal: Control of acute pain  Control of acute pain  Outcome: Ongoing  Pain/discomfort being managed with PRN analgesics per MD orders. Pt able to express presence and absence of pain and rate pain appropriately using numerical scale. Goal: Control of chronic pain  Control of chronic pain  Outcome: Ongoing  Pain/discomfort being managed with PRN analgesics per MD orders. Pt able to express presence and absence of pain and rate pain appropriately using numerical scale.
decrease  Pain level will decrease    Outcome: Ongoing  Pt assessed for pain. Pt in pain and assessed with 0-10 pain rating scale. Pt given prescribed analgesic for pain. (See eMar) Pt satisfied with pain relief thus far. Will reassess and continue to monitor. Electronically signed by Abby Wilson RN on 9/27/2018 at 12:56 PM        Problem: Venous Thromboembolism:  Goal: Absence of deep vein thrombosis  Absence of deep vein thrombosis   Outcome: Ongoing  ROJAS ricco hose in place. Heparin administered as directed. No s/s of DVT.   Electronically signed by Abby Wilson RN on 9/27/2018 at 12:58 PM

## 2018-09-27 NOTE — DISCHARGE SUMMARY
PROVIDENCIA RETTGERI     Antibiotic Interpretation IGOR Unit   ampicillin Resistant <=8 mcg/mL   ceFAZolin Resistant 8 mcg/mL   cefOXitin Sensitive <=8 mcg/mL   cefTRIAXone Sensitive <=1 mcg/mL   cefepime Sensitive <=2 mcg/mL   cefotaxime Sensitive <=2 mcg/mL   cefuroxime Sensitive <=4 mcg/mL   ciprofloxacin Resistant >2 mcg/mL   gentamicin Sensitive <=4 mcg/mL   meropenem Sensitive <=1 mcg/mL   nitrofurantoin Resistant >64 mcg/mL   piperacillin-tazobactam Sensitive <=16 mcg/mL   trimethoprim-sulfamethoxazole Resistant >2/38 mcg/mL          Labs: For convenience and continuity at follow-up the following most recent labs are provided:    CBC:    Lab Results   Component Value Date    WBC 7.8 09/27/2018    HGB 7.8 09/27/2018    HCT 23.9 09/27/2018     09/27/2018       Renal:    Lab Results   Component Value Date     09/27/2018    K 3.5 09/27/2018     09/27/2018    CO2 24 09/27/2018    BUN 21 09/27/2018    CREATININE 1.3 09/27/2018    CALCIUM 7.8 09/27/2018    PHOS 3.6 05/12/2016       Future Appointments  Date Time Provider [facility]   11/19/2018 10:50 AM Claudeen Metro, MD [facility]       Time Spent on discharge is more than 45 minutes in the examination, evaluation, counseling and review of medications and discharge plan. OARRS report personally reviewed. Resume chronic home pain medications on discharge. Signed:    Ena Simmonds, APRN - NP   9/27/2018      Thank you Claudeen Metro, MD for the opportunity to be involved in this patient's care. If you have any questions or concerns please feel free to contact me at [phone].

## 2018-09-27 NOTE — PROGRESS NOTES
[facility]   Progress Note      S/P :  SUBJECTIVE  In chair. Alert and oriented. . Pain is   described in left hip and with the intensity of moderate. Pain is described as aching. OBJECTIVE              Physical                      VITALS:  BP (!) 105/58   Pulse 82   Temp 97.7 °F (36.5 °C) (Oral)   Resp 16   Ht 5' 5\" (1.651 m)   Wt 133 lb 2.5 oz (60.4 kg)   LMP 01/01/2005   SpO2 91%   BMI 22.16 kg/m²                     MUSCULOSKELETAL:  left foot NVI. Wiggles toes to command. Pedal pulses are palpable.                    NEUROLOGIC:                                  Sensory:  Touch:  Left Lower Extremity:  normal                                                 Surgical wound appears clean and dry left hip with Mepilex AG dressing    Data       CBC:   Lab Results   Component Value Date    WBC 7.8 09/27/2018    RBC 2.41 09/27/2018    RBC 2.25 08/08/2017    HGB 7.8 09/27/2018    HCT 23.9 09/27/2018    MCV 99.2 09/27/2018    MCH 32.3 09/27/2018    MCHC 32.6 09/27/2018    RDW 17.9 09/27/2018     09/27/2018    MPV 7.7 09/27/2018        WBC:    Lab Results   Component Value Date    WBC 7.8 09/27/2018        Hemoglobin/Hematocrit:    Lab Results   Component Value Date    HGB 7.8 09/27/2018    HCT 23.9 09/27/2018        PT/INR:    Lab Results   Component Value Date    PROTIME 15.8 09/24/2018    INR 1.39 09/24/2018              Current Inpatient Medications             Current Facility-Administered Medications: cefTRIAXone (ROCEPHIN) 1 g IVPB in 50 mL D5W minibag, 1 g, Intravenous, Q24H  0.9 % sodium chloride infusion 250 mL, 250 mL, Intravenous, Once  sodium chloride flush 0.9 % injection 10 mL, 10 mL, Intravenous, 2 times per day  sodium chloride flush 0.9 % injection 10 mL, 10 mL, Intravenous, PRN  acetaminophen (TYLENOL) tablet 650 mg, 650 mg, Oral, Q4H PRN  ondansetron (ZOFRAN) injection 4 mg, 4 mg, Intravenous, Q6H PRN  0.45 % sodium chloride infusion, , Intravenous, Continuous  docusate sodium (COLACE) capsule 100 mg, 100 mg, Oral, BID  magnesium hydroxide (MILK OF MAGNESIA) 400 MG/5ML suspension 30 mL, 30 mL, Oral, Daily PRN  heparin (porcine) injection 5,000 Units, 5,000 Units, Subcutaneous, 3 times per day  metoprolol succinate (TOPROL XL) extended release tablet 50 mg, 50 mg, Oral, Daily  lactobacillus (CULTURELLE) capsule 1 capsule, 1 capsule, Oral, BID WC  aspirin EC tablet 81 mg, 81 mg, Oral, Daily  atorvastatin (LIPITOR) tablet 20 mg, 20 mg, Oral, Nightly  fentaNYL (DURAGESIC) 100 MCG/HR 1 patch, 1 patch, Transdermal, Q72H  methadone (DOLOPHINE) tablet 10 mg, 10 mg, Oral, Daily  mirtazapine (REMERON) tablet 15 mg, 15 mg, Oral, Nightly  nortriptyline (PAMELOR) capsule 75 mg, 75 mg, Oral, Nightly  oxyCODONE HCl (OXY-IR) immediate release tablet 40 mg, 40 mg, Oral, Q4H PRN  sennosides-docusate sodium (SENOKOT-S) 8.6-50 MG tablet 2 tablet, 2 tablet, Oral, Daily  potassium chloride (KLOR-CON M) extended release tablet 40 mEq, 40 mEq, Oral, PRN **OR** potassium chloride 20 MEQ/15ML (10%) oral solution 40 mEq, 40 mEq, Oral, PRN **OR** potassium chloride 10 mEq/100 mL IVPB (Peripheral Line), 10 mEq, Intravenous, PRN  magnesium sulfate 1 g in dextrose 5% 100 mL IVPB, 1 g, Intravenous, PRN  famotidine (PEPCID) tablet 20 mg, 20 mg, Oral, Daily PRN  nicotine (NICODERM CQ) 14 MG/24HR 1 patch, 1 patch, Transdermal, Daily PRN  morphine (PF) injection 2 mg, 2 mg, Intravenous, Q2H PRN **OR** morphine (PF) injection 4 mg, 4 mg, Intravenous, Q2H PRN  mometasone-formoterol (DULERA) 200-5 MCG/ACT inhaler 2 puff, 2 puff, Inhalation, BID  Facility-Administered Medications Ordered in Other Encounters: 0.9 % sodium chloride bolus, 250 mL, Intravenous, Once  ciprofloxacin (CIPRO) IVPB 400 mg, 400 mg, Intravenous, Once    ASSESSMENT AND PLAN      Post left hip hemiartrhoplasty yesterday per Dr Lewis December for hip fx, stable  Postop anemia of blood loss with hypotension.  Will transfuse 1U PRBC today

## 2018-09-27 NOTE — PROGRESS NOTES
Hospital Medicine Discharge Summary      Patient ID: Bang Castro      Patient's PCP: Lucero Sharp MD    Admit Date: 9/24/2018     Discharge Date:   09/27/18    Admitting Physician: Rand Iraheta MD     Discharge Physician: DOREEN Martin NP     Discharge Diagnoses: Active Hospital Problems    Diagnosis Date Noted    Cervical cancer ([facility] [address] [C53.9] 02/02/2010     Priority: Medium    Tachycardia [R00.0]     Closed displaced fracture of left femoral neck ([facility] [address] [S72.002A] 09/24/2018    Closed fracture of neck of left femur ([facility] [address] [S72.002A]     Vesico-vaginal fistula [N82.0] 05/03/2016     Disposition:  [] Home  [] Home with home health [x] Rehab [] Psych [] SNF  [] LTAC  [] Long term nursing home or group home [] Transfer to ICU  [] Transfer to PCU [] Other:    Discharge Instructions/Follow-up:      F/u with heme/onc as recommended    F/u with urology for bilateral nephrostomy tubes as scheduled    F/u with Ortho Dr. Glory Hutchins. Call for appointment    Continue taking medications as prescribed    Continued PT/OT while in rehab    PCP/SNF to follow up: As above    Discharge Condition: Stable      Code Status:  Full Code     Activity: activity as tolerated    Diet: Dietary Nutrition Supplements: Standard High Calorie Oral Supplement  DIET GENERAL;  Dietary Nutrition Supplements: Standard High Calorie Oral Supplement     Discharge Medications:     Current Discharge Medication List           Details   metoprolol succinate (TOPROL XL) 50 MG extended release tablet Take 1 tablet by mouth daily  Qty: 30 tablet, Refills: 0      nicotine (NICODERM CQ) 14 MG/24HR Place 1 patch onto the skin daily as needed (if a smoker)  Qty: 30 patch, Refills: 0      apixaban (ELIQUIS) 5 MG TABS tablet Take 1 tablet by mouth 2 times daily  Qty: 60 tablet, Refills: 0              Details   methadone (DOLOPHINE) 10 MG tablet Take 10 mg by mouth daily. Adin Vallejo       oxyCODONE (ROXICODONE) 20 MG immediate release tablet Take 40-80 mg by mouth every 4 hours as needed for Pain. .      mirtazapine (REMERON) 15 MG tablet TAKE 1 TABLET BY MOUTH NIGHTLY  Qty: 30 tablet, Refills: 5    Associated Diagnoses: Abnormal weight loss; Reactive depression (situational)      atorvastatin (LIPITOR) 20 MG tablet TAKE 1 TABLET BY MOUTH DAILY  Qty: 30 tablet, Refills: 5      nortriptyline (PAMELOR) 75 MG capsule TAKE 1 CAPSULE BY MOUTH NIGHTLY  Qty: 90 capsule, Refills: 1    Associated Diagnoses: Proctodynia; Chemotherapy-induced neuropathy (HCC)      furosemide (LASIX) 20 MG tablet TAKE 1 TO 2 TABLETS DAILY AS NEEDED FOR LEG SWELLING. Qty: 180 tablet, Refills: 0    Associated Diagnoses: Chronic kidney disease, stage III (moderate) ([address] Pedal edema      fentaNYL (DURAGESIC) 100 MCG/HR Place 1 patch onto the skin every 72 hours. tetracycline (ACHROMYCIN;SUMYCIN) 250 MG capsule Take 250 mg by mouth 2 times daily       aspirin EC 81 MG EC tablet Take 1 tablet by mouth daily  Qty: 30 tablet, Refills: 3      sennosides-docusate sodium (SENOKOT-S) 8.6-50 MG tablet Take 2 tablets by mouth daily  Qty: 60 tablet, Refills: 11    Associated Diagnoses: Constipation due to pain medication      ondansetron (ZOFRAN) 8 MG tablet Take 1 tablet by mouth every 8 hours as needed for Nausea or Vomiting  Qty: 30 tablet, Refills: 5      albuterol sulfate HFA (VENTOLIN HFA) 108 (90 Base) MCG/ACT inhaler Inhale 2 puffs into the lungs every 6 hours as needed for Wheezing  Qty: 1 Inhaler, Refills: 5    Comments: You may substitute any albuterol metered dose inhaler for this product = Pro-air, Ventolin, or Proventil. fluticasone-vilanterol (BREO ELLIPTA) 100-25 MCG/INH AEPB 1 inhalation once a day. Rinse mouth out well after each use. Qty: 1 each, Refills: 5             The patient was seen and examined on day of discharge and this discharge summary is in conjunction with any daily progress note from day of discharge. Hospital Course:  The patient is a 63 year Non-distended. BS+. LLQ colostomy +. Bilateral nephrostomy tubes +. Extremities: PP+. No redness/cyanosis/edema noted. Brisk cap refill. Dressing to L hip CDI. Good sensation and movement to L foot. Skin: Dry and intact. No lesions noted, except as above. Neuro: Grossly intact. No focal deficits noted. Consults:     IP CONSULT TO HOSPITALIST  IP CONSULT TO ORTHOPEDIC SURGERY  IP CONSULT TO CARDIOLOGY  IP CONSULT TO SOCIAL WORK  IP CONSULT TO SOCIAL WORK  IP CONSULT TO SOCIAL WORK  IP CONSULT TO PHYSICAL MEDICINE REHAB    Significant Diagnostic Studies:     XR HIP LEFT (2-3 VIEWS)   Final Result   Postop changes without complicating feature. XR CHEST PORTABLE   Final Result   1. No acute cardiopulmonary abnormality. 2. Right lung nodules and mass in keeping with pulmonary metastatic disease. XR HIP LEFT (2-3 VIEWS)   Final Result   Angulated fracture of the left femoral neck. Labs: For convenience and continuity at follow-up the following most recent labs are provided:    CBC:    Lab Results   Component Value Date    WBC 7.8 09/27/2018    HGB 7.8 09/27/2018    HCT 23.9 09/27/2018     09/27/2018       Renal:  Lab Results   Component Value Date     09/27/2018    K 3.5 09/27/2018     09/27/2018    CO2 24 09/27/2018    BUN 21 09/27/2018    CREATININE 1.3 09/27/2018    CALCIUM 7.8 09/27/2018    PHOS 3.6 05/12/2016       Future Appointments  Date Time Provider Milena John   11/19/2018 10:50 AM Hien Taylor MD [facility]       Time Spent on discharge is more than 45 minutes in the examination, evaluation, counseling and review of medications and discharge plan. OARRS report personally reviewed. Resume chronic home pain medications on discharge. Signed:    DOREEN Lovelace - NP   9/27/2018      Thank you Hien Taylor MD for the opportunity to be involved in this patient's care.  If you have any questions or concerns please feel free to

## 2018-09-27 NOTE — PROGRESS NOTES
Patient admitted to room 3260 per recliner. Patient was oriented to the Call Light, Phone, TV, Thermostat, Bed Controls, Bathroom and Emergency Cord. Patient verbalized and demonstrated understanding of all. Patient was also given an over view of Unit Routines for Acute Rehab, including what to wear for therapy. The patient's role in goal setting was reviewed along with an explanation of the Interdisciplinary Team meeting, the 's role in coordinating services and the Discharge Planning/Continuum of Care process. Patient Rights and Responsibilities were reviewed. Meal times were explained, including how to order food. The white board (used for communication) was pointed out emphasizing  the 3 hours/day Therapy Schedule (posted most evenings), the number (and process) for reporting grievances, and the Doctor's, Nurse's, and PCA's names. It was recommended that any family that will be care givers or any care givers the patient has, take part in therapy. There are no set visiting hours, and it was suggested that non-caregiver friends and family visitors come after therapy (at 4 PM or later) to allow patient to rest in between sessions.

## 2018-09-27 NOTE — PROGRESS NOTES
Occupational [address]  [id number]  [id number]    Attempted to see for OT follow up session this am. Patient declined OT due to receive blood prior to discharge to IP Rehab. Please see last note for discharge status.      Electronically signed by Lili Carrera, [id number] on 9/27/2018 at 11:17 AM

## 2018-09-27 NOTE — PROGRESS NOTES
Patient A&O in bed. Patient tolerating PO intake. Patient denies pain, nausea, and vomiting at this time. Call light within reach. Bed in lowest position and wheels locked. 2/4 bed rails up. Able to make needs known. Fall precautions in place. Will continue to monitor and assess.

## 2018-09-27 NOTE — PROGRESS NOTES
Checking on patient Q2H for nutrition needs, hygiene needs, comfort measures, mobility, fall risk interventions, and safe environment. All precautions and interventions in place. Educated patient on use of call light and telephone. Patient verbalizes understanding. Call light/telephone in reach.   Electronically signed by Wil Rouse RN on 9/26/2018 at 8:19 PM

## 2018-09-27 NOTE — PROGRESS NOTES
Pt tolerated 1 unit PRBCs without difficulty. No s/s of transfusion reaction. Pt to discharge to inpatient rehab today. Will monitor.   Electronically signed by Efra Frey RN on 9/27/2018 at 2:37 PM

## 2018-09-28 LAB
ANION GAP SERPL CALCULATED.3IONS-SCNC: 8 MMOL/L (ref 3–16)
BASOPHILS ABSOLUTE: 0 K/UL (ref 0–0.2)
BASOPHILS RELATIVE PERCENT: 0.5 %
BUN BLDV-MCNC: 18 MG/DL (ref 7–20)
CALCIUM SERPL-MCNC: 8 MG/DL (ref 8.3–10.6)
CHLORIDE BLD-SCNC: 106 MMOL/L (ref 99–110)
CO2: 24 MMOL/L (ref 21–32)
CREAT SERPL-MCNC: 1.3 MG/DL (ref 0.6–1.2)
EOSINOPHILS ABSOLUTE: 0.1 K/UL (ref 0–0.6)
EOSINOPHILS RELATIVE PERCENT: 0.9 %
GFR AFRICAN AMERICAN: 50
GFR NON-AFRICAN AMERICAN: 42
GLUCOSE BLD-MCNC: 143 MG/DL (ref 70–99)
GLUCOSE BLD-MCNC: 94 MG/DL (ref 70–99)
HCT VFR BLD CALC: 26.8 % (ref 36–48)
HEMOGLOBIN: 9.1 G/DL (ref 12–16)
LYMPHOCYTES ABSOLUTE: 0.4 K/UL (ref 1–5.1)
LYMPHOCYTES RELATIVE PERCENT: 6.7 %
MAGNESIUM: 1.8 MG/DL (ref 1.8–2.4)
MCH RBC QN AUTO: 32.9 PG (ref 26–34)
MCHC RBC AUTO-ENTMCNC: 34.1 G/DL (ref 31–36)
MCV RBC AUTO: 96.5 FL (ref 80–100)
MONOCYTES ABSOLUTE: 0.5 K/UL (ref 0–1.3)
MONOCYTES RELATIVE PERCENT: 8.3 %
NEUTROPHILS ABSOLUTE: 5.1 K/UL (ref 1.7–7.7)
NEUTROPHILS RELATIVE PERCENT: 83.6 %
PDW BLD-RTO: 16.5 % (ref 12.4–15.4)
PERFORMED ON: ABNORMAL
PLATELET # BLD: 156 K/UL (ref 135–450)
PMV BLD AUTO: 7.3 FL (ref 5–10.5)
POTASSIUM SERPL-SCNC: 3.7 MMOL/L (ref 3.5–5.1)
RBC # BLD: 2.77 M/UL (ref 4–5.2)
SODIUM BLD-SCNC: 138 MMOL/L (ref 136–145)
WBC # BLD: 6.1 K/UL (ref 4–11)

## 2018-09-28 PROCEDURE — 83735 ASSAY OF MAGNESIUM: CPT

## 2018-09-28 PROCEDURE — 97530 THERAPEUTIC ACTIVITIES: CPT | Performed by: PHYSICAL THERAPIST

## 2018-09-28 PROCEDURE — 6360000002 HC RX W HCPCS: Performed by: PHYSICAL MEDICINE & REHABILITATION

## 2018-09-28 PROCEDURE — 6370000000 HC RX 637 (ALT 250 FOR IP): Performed by: PHYSICAL MEDICINE & REHABILITATION

## 2018-09-28 PROCEDURE — 80048 BASIC METABOLIC PNL TOTAL CA: CPT

## 2018-09-28 PROCEDURE — 97535 SELF CARE MNGMENT TRAINING: CPT

## 2018-09-28 PROCEDURE — 97162 PT EVAL MOD COMPLEX 30 MIN: CPT | Performed by: PHYSICAL THERAPIST

## 2018-09-28 PROCEDURE — 97116 GAIT TRAINING THERAPY: CPT | Performed by: PHYSICAL THERAPIST

## 2018-09-28 PROCEDURE — 97166 OT EVAL MOD COMPLEX 45 MIN: CPT

## 2018-09-28 PROCEDURE — 97110 THERAPEUTIC EXERCISES: CPT | Performed by: PHYSICAL THERAPIST

## 2018-09-28 PROCEDURE — 36415 COLL VENOUS BLD VENIPUNCTURE: CPT

## 2018-09-28 PROCEDURE — 36591 DRAW BLOOD OFF VENOUS DEVICE: CPT

## 2018-09-28 PROCEDURE — 1280000000 HC REHAB R&B

## 2018-09-28 PROCEDURE — 85025 COMPLETE CBC W/AUTO DIFF WBC: CPT

## 2018-09-28 PROCEDURE — 2580000003 HC RX 258: Performed by: PHYSICAL MEDICINE & REHABILITATION

## 2018-09-28 PROCEDURE — 97530 THERAPEUTIC ACTIVITIES: CPT

## 2018-09-28 RX ORDER — SODIUM CHLORIDE 0.9 % (FLUSH) 0.9 %
10 SYRINGE (ML) INJECTION 2 TIMES DAILY
Status: DISCONTINUED | OUTPATIENT
Start: 2018-09-28 | End: 2018-10-06 | Stop reason: HOSPADM

## 2018-09-28 RX ADMIN — CALCIUM CARBONATE-VITAMIN D TAB 500 MG-200 UNIT 1 TABLET: 500-200 TAB at 22:44

## 2018-09-28 RX ADMIN — Medication 400 MG: at 09:51

## 2018-09-28 RX ADMIN — HEPARIN SODIUM 5000 UNITS: 5000 INJECTION INTRAVENOUS; SUBCUTANEOUS at 06:19

## 2018-09-28 RX ADMIN — CALCIUM CARBONATE-VITAMIN D TAB 500 MG-200 UNIT 1 TABLET: 500-200 TAB at 09:51

## 2018-09-28 RX ADMIN — HEPARIN SODIUM 5000 UNITS: 5000 INJECTION INTRAVENOUS; SUBCUTANEOUS at 13:42

## 2018-09-28 RX ADMIN — POTASSIUM CHLORIDE 20 MEQ: 20 TABLET, EXTENDED RELEASE ORAL at 09:50

## 2018-09-28 RX ADMIN — Medication 1 CAPSULE: at 17:46

## 2018-09-28 RX ADMIN — OXYCODONE HYDROCHLORIDE 40 MG: 10 TABLET ORAL at 07:18

## 2018-09-28 RX ADMIN — MIRTAZAPINE 15 MG: 15 TABLET, FILM COATED ORAL at 22:44

## 2018-09-28 RX ADMIN — Medication 400 MG: at 22:44

## 2018-09-28 RX ADMIN — Medication 10 ML: at 22:48

## 2018-09-28 RX ADMIN — OXYCODONE HYDROCHLORIDE 40 MG: 10 TABLET ORAL at 17:45

## 2018-09-28 RX ADMIN — HEPARIN SODIUM 5000 UNITS: 5000 INJECTION INTRAVENOUS; SUBCUTANEOUS at 22:44

## 2018-09-28 RX ADMIN — METHADONE HYDROCHLORIDE 10 MG: 10 TABLET ORAL at 09:53

## 2018-09-28 RX ADMIN — Medication 10 ML: at 12:26

## 2018-09-28 RX ADMIN — ATORVASTATIN CALCIUM 20 MG: 20 TABLET, FILM COATED ORAL at 22:44

## 2018-09-28 RX ADMIN — ASPIRIN 81 MG: 81 TABLET, COATED ORAL at 09:51

## 2018-09-28 RX ADMIN — SENNOSIDES AND DOCUSATE SODIUM 1 TABLET: 8.6; 5 TABLET ORAL at 22:43

## 2018-09-28 RX ADMIN — NORTRIPTYLINE HYDROCHLORIDE 75 MG: 25 CAPSULE ORAL at 22:43

## 2018-09-28 RX ADMIN — Medication 1 CAPSULE: at 09:51

## 2018-09-28 ASSESSMENT — PAIN DESCRIPTION - PAIN TYPE
TYPE: SURGICAL PAIN
TYPE: ACUTE PAIN;SURGICAL PAIN
TYPE: ACUTE PAIN;SURGICAL PAIN
TYPE: SURGICAL PAIN
TYPE: SURGICAL PAIN

## 2018-09-28 ASSESSMENT — PAIN SCALES - GENERAL
PAINLEVEL_OUTOF10: 5
PAINLEVEL_OUTOF10: 4
PAINLEVEL_OUTOF10: 7
PAINLEVEL_OUTOF10: 5
PAINLEVEL_OUTOF10: 4
PAINLEVEL_OUTOF10: 7
PAINLEVEL_OUTOF10: 3
PAINLEVEL_OUTOF10: 7
PAINLEVEL_OUTOF10: 6
PAINLEVEL_OUTOF10: 7
PAINLEVEL_OUTOF10: 4

## 2018-09-28 ASSESSMENT — PAIN DESCRIPTION - LOCATION
LOCATION: HIP
LOCATION: HIP
LOCATION: HIP;LEG
LOCATION: HIP
LOCATION: HIP

## 2018-09-28 ASSESSMENT — PAIN DESCRIPTION - DESCRIPTORS: DESCRIPTORS: ACHING

## 2018-09-28 ASSESSMENT — PAIN DESCRIPTION - ORIENTATION
ORIENTATION: LEFT

## 2018-09-28 ASSESSMENT — 9 HOLE PEG TEST
TESTTIME_SECONDS: 31.4
TESTTIME_SECONDS: 32.2

## 2018-09-28 NOTE — CONSULTS
Nutrition Assessment    Type and Reason for Visit: Initial, Consult    Malnutrition Assessment:  · Malnutrition Status: No malnutrition    Nutrition Diagnosis:   · Problem: No nutrition diagnosis at this time    Nutrition Assessment:  · Subjective Assessment: Consult for patient request to see dietitian. Pt admitted with hip fracture s/p L hip hemiarthroplasty. Hx includes colorectal cancer with mets to the liver and lung, COPD. Pt with colostomy. Pt currently on General diet with Ensure BID. Pt reports appetite good currently and PTA. Reported wt actually trending up overall. Was as low 90 lb at one time. Does not like Ensure, will d/c. Denied any further questions or needs at this time. Surgical wound, otherwise skin intact. No recommendations at this time. Nutrition Risk Level   Risk Level: Low    Nutrition Intervention  Food and/or Delivery: Continue current diet  Nutrition Education/Counseling/Coordination of Care:  Continued Inpatient Monitoring    Patient assessed for nutrition risk. Deemed to be at low risk at this time. Will continue to follow patient.       Electronically signed by Arlen Waggoner RD, KARLEY on 9/28/18 at 12:45 PM    Contact Number: [phone]

## 2018-09-28 NOTE — PLAN OF CARE
[facility] PATIENT TREATMENT PLAN  [facility] [id number]  [phone]    Vivian Villatoro    : 1955  Acct #: [de-identified]  MRN: [id number]   PHYSICIAN:  Christopher Youngblood MD    Rehabilitation Diagnosis:    Left hip transcervical femoral neck fracture-oxycodone     Post op anemia-transfusion     Colorectal cancer with metastasis, colostomy-Duragesic patch, methadone     vesicovaginal fistula s/p bilateral percutaneous nephrostomy tubes      Peripheral vascular disease      HTN-Toprol     HLD-Lipitor     Chronic kidney disease        ADMIT DATE:2018    Patient Goals: To return home with daughter. Admitting Impairments: Decreased functional mobility ; Decreased ADL status; Decreased ROM; Decreased safe awareness;Decreased endurance;Decreased balance  Barriers: limited support at home, medical co morbidities, 2 dogs at home   Participation: Despite medical conditions, patient was independent at home, ambulated without device, managed own medications and finances and enjoyed visiting with friends.   Patient's goal is to return to an indeependent level of functioning      CARE PLAN     NURSING:  Vivian Villatoro while on this unit will:     [] Be continent of bowel and bladder     [] Have an adequate number of bowel movements  [] Urinate with no urinary retention >300ml in bladder  [] Complete bladder protocol with holley removal  [x] Maintain O2 SATs at 92%  [x] Have pain managed while on ARU       [] Be pain free by discharge   [x] Have no skin breakdown while on ARU  [] Have improved skin integrity via wound measurements  [x] Have no signs/symptoms of infection at the wound site  [x] Be free from injury during hospitalization   [x] Complete education with patient/family with understanding demonstrated for:  [x] Adjustment   [x] Other: Hip safety, COPD, colostomy care, nephrostomy care, smoking cessation  Nursing interventions may include bowel/bladder

## 2018-09-28 NOTE — PLAN OF CARE
Problem: Falls - Risk of:  Goal: Will remain free from falls  Will remain free from falls   Outcome: Ongoing  Calls for needs, continue to promote safety. Problem: Risk for Impaired Skin Integrity  Goal: Tissue integrity - skin and mucous membranes  Structural intactness and normal physiological function of skin and  mucous membranes. Outcome: Ongoing  Monitor no issues noted, has surgical dressing on. Problem: Pain:  Goal: Pain level will decrease  Pain level will decrease   Outcome: Ongoing  Offer pain medication as needed. Declines ice. Problem: Activity Intolerance:  Intervention: Assess signs of activity intolerance  Has COPD, using IS well. Problem: Tobacco Use:  Intervention: Tobacco-use cessation counseling  Declined Nicoderm patch, current smoker, unsure if will quit once home, states ready to die. Problem: Infection - Surgical Site:  Intervention: Provide wound care  Has surgical dressing on, done 09/26 continue to monitor.

## 2018-09-28 NOTE — PROGRESS NOTES
4 Eyes Skin Assessment     The patient is being assess for  Admission    I agree that 2 RN's have performed a thorough Head to Toe Skin Assessment on the patient. ALL assessment sites listed below have been assessed. Areas assessed by both nurses: Singh Silva RN  [x]   Head, Face, and Ears   [x]   Shoulders, Back, and Chest  [x]   Arms, Elbows, and Hands   [x]   Coccyx, Sacrum, and IschIum  [x]   Legs, Feet, and Heels        Does the Patient have Skin Breakdown?   No         Pete Prevention initiated:  No   Wound Care Orders initiated:  No      [facility] nurse consulted for Pressure Injury (Stage 3,4, Unstageable, DTI, NWPT, and Complex wounds), New and Established Ostomies:  No      Nurse 1 eSignature: Electronically signed by Aidan Hung RN on 9/27/18 at 11:28 PM    **SHARE this note so that the co-signing nurse is able to place an eSignature**    Nurse 2 eSignature: Electronically signed by Alex Tijerina RN on 9/28/18 at 1:00 AM

## 2018-09-28 NOTE — H&P
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 ([facility] [address] Asthma; Asymptomatic gallstones; Bacterial infection; Benign essential hypertension; Cataract; Cervical cancer ([facility] [address] Chemotherapy induced neutropenia ([facility] [address] Chronic insomnia; Closed fracture of right proximal humerus; Colon carcinoma metastatic to multiple sites [facility]); Colostomy status ([facility] [address] History of blood transfusion; Humerus fracture; Liver disease; Nephrostomy status ([facility] [address] Pneumonia; Proctodynia; PVD (peripheral vascular disease) ([facility] [address] Radiation proctitis; Stress incontinence; and Wears glasses. reports that she has been smoking Cigarettes. She has a 18.50 pack-year smoking history. She has never used smokeless tobacco. She reports that she does not drink alcohol or use drugs. family history includes Heart Failure in her father; Other in her father and mother; Prostate Cancer in her father. REVIEW OF SYSTEMS:   CONSTITUTIONAL: negative for fevers, chills, diaphoresis, activity change, appetite change, fatigue, night sweats, positive metastatic colon cancer. EYES: negative for blurred vision, eye discharge, visual disturbance and icterus. HEENT: negative for hearing loss, tinnitus, ear drainage, sinus pressure, nasal congestion, epistaxis and snoring. Missing teeth anteriorly. RESPIRATORY: Negative for hemoptysis, cough, sputum production. CARDIOVASCULAR: negative for chest pain, palpitations, exertional chest pressure/discomfort, edema, syncope. GASTROINTESTINAL: negative for nausea, vomiting, diarrhea, constipation, blood in stool and abdominal pain. + Colostomy bag  GENITOURINARY: negative for frequency, dysuria, urinary incontinence, decreased urine volume, and hematuria. + Nephrostomy tubes  HEMATOLOGIC/LYMPHATIC: negative for easy bruising, bleeding and lymphadenopathy. ALLERGIC/IMMUNOLOGIC: negative for recurrent infections, angioedema, anaphylaxis and drug reactions.    ENDOCRINE: negative for weight 09/27/2018    BUN 21 (H) 09/27/2018     09/27/2018    K 3.5 09/27/2018     09/27/2018    CO2 24 09/27/2018     Lab Results   Component Value Date    ALT 10 09/24/2018    AST 16 09/24/2018    ALKPHOS 258 (H) 09/24/2018    BILITOT 0.3 09/24/2018       Xr Hip Left (2-3 Views)    Result Date: 9/26/2018  EXAMINATION: 2 XRAY VIEWS OF THE LEFT HIP 9/26/2018 9:38 am COMPARISON: None. HISTORY: ORDERING SYSTEM PROVIDED HISTORY: Left hip hemiarthroplasty. TECHNOLOGIST PROVIDED HISTORY: Of operative side while in recovery room. Reason for exam:->Left hip hemiarthroplasty. Ordering Physician Provided Reason for Exam: post op left hip Acuity: Acute Type of Exam: Subsequent/Follow-up FINDINGS: Postsurgical changes of bipolar hemiarthroplasty are present. Hardware is in satisfactory position. There is no fracture, malalignment or unexpected radiopaque foreign body. Multiple vascular stents are noted within the pelvis. Postop changes without complicating feature. Xr Hip Left (2-3 Views)    Result Date: 9/24/2018  EXAMINATION: 2 XRAY VIEWS OF THE LEFT HIP 9/24/2018 2:03 pm COMPARISON: CT 08/08/2018 HISTORY: ORDERING SYSTEM PROVIDED HISTORY: pain after fall x2 weeks ago TECHNOLOGIST PROVIDED HISTORY: Reason for exam:->pain after fall x2 weeks ago Ordering Physician Provided Reason for Exam: fell 2 weeks ago intense pain cant move left leg  hx lung ca mets to liver Acuity: Acute Type of Exam: Initial FINDINGS: Slightly angulated fracture of the left femoral neck. Hip and SI joint spaces are maintained. Vascular stents project over the pelvis. Drainage tubes project over the abdomen. Angulated fracture of the left femoral neck.      Xr Chest Portable    Result Date: 9/24/2018  EXAMINATION: SINGLE XRAY VIEW OF THE CHEST 9/24/2018 3:05 pm COMPARISON: Chest CT 05/09/2018 HISTORY: ORDERING SYSTEM PROVIDED HISTORY: Weakness TECHNOLOGIST PROVIDED HISTORY: Reason for exam:->Weakness Ordering Physician Provided

## 2018-09-28 NOTE — PROGRESS NOTES
Number of Steps: 3  Entrance Stairs - Rails: Left  Bathroom Shower/Tub: Tub/Shower unit, Shower chair with back, Curtain  Bathroom Toilet: Handicap height  Bathroom Equipment: Shower chair, Hand-held shower  Bathroom Accessibility: Walker accessible  Home Equipment: Rolling walker, Cane  Receives Help From: Family  ADL Assistance: Independent (Pt reports she does not sit on toilet - empties colostomy bag into toilet but does not sit. Pt reports changing depends a few times per day. )  Homemaking Assistance:  (Pt did cook - uses stove top, reports often microwaving. Pt does not clean. Pt does go to basement to do laundry occasionally. )  Ambulation Assistance: Independent  Transfer Assistance: Independent  Active : Yes  Mode of Transportation: [facility]  Occupation: On disability  Type of occupation:    Leisure & Hobbies: Visiting friends, watch TV   IADL Comments: Pt manages own medication in pill tray. Pt manages own finances. Additional Comments: Pt denies history of falls, excluding the fall at sister that resulted in hospitalization.    Objective     Observation/Palpation  Observation: Patient has bilateral nephrosomy and colostomy  Edema: L thigh and knee more edematous then R LE    AROM RLE (degrees)  RLE AROM: WFL  AROM LLE (degrees)  LLE AROM : Exceptions  LLE General AROM: ankle WFL  L Hip Flexion 0-125: 50  L Hip ABduction 0-45: 10  L Knee Flexion 0-145: 120  L Knee Extension 0: lacking 10 degrees actively  Strength RLE  Strength RLE: Exception  Comment: hip flexion 4-/5, knee and ankle WFL  Strength LLE  Strength LLE: Exception  L Hip Flexion: 3-/5  L Hip ABduction: 2+/5  L Knee Flexion: 4-/5  L Knee Extension: 4-/5  L Ankle Dorsiflexion: 4+/5  Tone RLE  RLE Tone: Normotonic  Tone LLE  LLE Tone: Normotonic  Motor Control  Gross Motor?: WFL  Sensation  Overall Sensation Status: WFL  Bed mobility  Bridging: Unable to assess  Rolling to Left: Unable to assess  Rolling to Right: Unable to

## 2018-09-29 LAB
BASOPHILS ABSOLUTE: 0 K/UL (ref 0–0.2)
BASOPHILS RELATIVE PERCENT: 0.6 %
BLOOD CULTURE, ROUTINE: NORMAL
CULTURE, BLOOD 2: NORMAL
EOSINOPHILS ABSOLUTE: 0.1 K/UL (ref 0–0.6)
EOSINOPHILS RELATIVE PERCENT: 1.8 %
HCT VFR BLD CALC: 23.7 % (ref 36–48)
HEMOGLOBIN: 7.9 G/DL (ref 12–16)
LYMPHOCYTES ABSOLUTE: 0.4 K/UL (ref 1–5.1)
LYMPHOCYTES RELATIVE PERCENT: 7.3 %
MCH RBC QN AUTO: 32.7 PG (ref 26–34)
MCHC RBC AUTO-ENTMCNC: 33.5 G/DL (ref 31–36)
MCV RBC AUTO: 97.7 FL (ref 80–100)
MONOCYTES ABSOLUTE: 0.4 K/UL (ref 0–1.3)
MONOCYTES RELATIVE PERCENT: 8.3 %
NEUTROPHILS ABSOLUTE: 4 K/UL (ref 1.7–7.7)
NEUTROPHILS RELATIVE PERCENT: 82 %
PDW BLD-RTO: 16.5 % (ref 12.4–15.4)
PLATELET # BLD: 143 K/UL (ref 135–450)
PMV BLD AUTO: 7.8 FL (ref 5–10.5)
RBC # BLD: 2.42 M/UL (ref 4–5.2)
WBC # BLD: 4.9 K/UL (ref 4–11)

## 2018-09-29 PROCEDURE — 6360000002 HC RX W HCPCS: Performed by: PHYSICAL MEDICINE & REHABILITATION

## 2018-09-29 PROCEDURE — 94760 N-INVAS EAR/PLS OXIMETRY 1: CPT

## 2018-09-29 PROCEDURE — 2580000003 HC RX 258: Performed by: PHYSICAL MEDICINE & REHABILITATION

## 2018-09-29 PROCEDURE — 6370000000 HC RX 637 (ALT 250 FOR IP): Performed by: PHYSICAL MEDICINE & REHABILITATION

## 2018-09-29 PROCEDURE — 97110 THERAPEUTIC EXERCISES: CPT

## 2018-09-29 PROCEDURE — 1280000000 HC REHAB R&B

## 2018-09-29 PROCEDURE — 97530 THERAPEUTIC ACTIVITIES: CPT

## 2018-09-29 PROCEDURE — G0008 ADMIN INFLUENZA VIRUS VAC: HCPCS | Performed by: PHYSICAL MEDICINE & REHABILITATION

## 2018-09-29 PROCEDURE — 97116 GAIT TRAINING THERAPY: CPT

## 2018-09-29 PROCEDURE — 97535 SELF CARE MNGMENT TRAINING: CPT

## 2018-09-29 PROCEDURE — 85025 COMPLETE CBC W/AUTO DIFF WBC: CPT

## 2018-09-29 PROCEDURE — 90686 IIV4 VACC NO PRSV 0.5 ML IM: CPT | Performed by: PHYSICAL MEDICINE & REHABILITATION

## 2018-09-29 RX ADMIN — Medication 400 MG: at 20:30

## 2018-09-29 RX ADMIN — Medication 400 MG: at 09:45

## 2018-09-29 RX ADMIN — CALCIUM CARBONATE-VITAMIN D TAB 500 MG-200 UNIT 1 TABLET: 500-200 TAB at 09:44

## 2018-09-29 RX ADMIN — METHADONE HYDROCHLORIDE 10 MG: 10 TABLET ORAL at 09:44

## 2018-09-29 RX ADMIN — Medication 10 ML: at 09:45

## 2018-09-29 RX ADMIN — Medication 1 CAPSULE: at 18:06

## 2018-09-29 RX ADMIN — Medication 1 CAPSULE: at 09:44

## 2018-09-29 RX ADMIN — INFLUENZA A VIRUS A/MICHIGAN/45/2015 X-275 (H1N1) ANTIGEN (FORMALDEHYDE INACTIVATED), INFLUENZA A VIRUS A/SINGAPORE/INFIMH-16-0019/2016 IVR-186 (H3N2) ANTIGEN (FORMALDEHYDE INACTIVATED), INFLUENZA B VIRUS B/PHUKET/3073/2013 ANTIGEN (FORMALDEHYDE INACTIVATED), AND INFLUENZA B VIRUS B/MARYLAND/15/2016 BX-69A ANTIGEN (FORMALDEHYDE INACTIVATED) 0.5 ML: 15; 15; 15; 15 INJECTION, SUSPENSION INTRAMUSCULAR at 09:46

## 2018-09-29 RX ADMIN — HEPARIN SODIUM 5000 UNITS: 5000 INJECTION INTRAVENOUS; SUBCUTANEOUS at 06:32

## 2018-09-29 RX ADMIN — OXYCODONE HYDROCHLORIDE 40 MG: 10 TABLET ORAL at 18:06

## 2018-09-29 RX ADMIN — CALCIUM CARBONATE-VITAMIN D TAB 500 MG-200 UNIT 1 TABLET: 500-200 TAB at 20:30

## 2018-09-29 RX ADMIN — SENNOSIDES AND DOCUSATE SODIUM 1 TABLET: 8.6; 5 TABLET ORAL at 09:44

## 2018-09-29 RX ADMIN — OXYCODONE HYDROCHLORIDE 40 MG: 10 TABLET ORAL at 23:18

## 2018-09-29 RX ADMIN — NORTRIPTYLINE HYDROCHLORIDE 75 MG: 25 CAPSULE ORAL at 20:30

## 2018-09-29 RX ADMIN — OXYCODONE HYDROCHLORIDE 40 MG: 10 TABLET ORAL at 09:45

## 2018-09-29 RX ADMIN — ATORVASTATIN CALCIUM 20 MG: 20 TABLET, FILM COATED ORAL at 20:30

## 2018-09-29 RX ADMIN — METOPROLOL SUCCINATE 50 MG: 50 TABLET, EXTENDED RELEASE ORAL at 09:44

## 2018-09-29 RX ADMIN — OXYCODONE HYDROCHLORIDE 40 MG: 10 TABLET ORAL at 02:27

## 2018-09-29 RX ADMIN — MIRTAZAPINE 15 MG: 15 TABLET, FILM COATED ORAL at 20:30

## 2018-09-29 RX ADMIN — OXYCODONE HYDROCHLORIDE 40 MG: 10 TABLET ORAL at 13:41

## 2018-09-29 RX ADMIN — Medication 10 ML: at 20:32

## 2018-09-29 RX ADMIN — POTASSIUM CHLORIDE 20 MEQ: 20 TABLET, EXTENDED RELEASE ORAL at 09:44

## 2018-09-29 RX ADMIN — HEPARIN SODIUM 5000 UNITS: 5000 INJECTION INTRAVENOUS; SUBCUTANEOUS at 20:33

## 2018-09-29 RX ADMIN — HEPARIN SODIUM 5000 UNITS: 5000 INJECTION INTRAVENOUS; SUBCUTANEOUS at 13:37

## 2018-09-29 RX ADMIN — ASPIRIN 81 MG: 81 TABLET, COATED ORAL at 09:44

## 2018-09-29 ASSESSMENT — PAIN SCALES - GENERAL
PAINLEVEL_OUTOF10: 0
PAINLEVEL_OUTOF10: 0
PAINLEVEL_OUTOF10: 8
PAINLEVEL_OUTOF10: 7
PAINLEVEL_OUTOF10: 0
PAINLEVEL_OUTOF10: 8
PAINLEVEL_OUTOF10: 6
PAINLEVEL_OUTOF10: 6
PAINLEVEL_OUTOF10: 7
PAINLEVEL_OUTOF10: 8
PAINLEVEL_OUTOF10: 8
PAINLEVEL_OUTOF10: 0

## 2018-09-29 ASSESSMENT — PAIN DESCRIPTION - FREQUENCY
FREQUENCY: INTERMITTENT

## 2018-09-29 ASSESSMENT — PAIN DESCRIPTION - ONSET
ONSET: ON-GOING

## 2018-09-29 ASSESSMENT — PAIN DESCRIPTION - PAIN TYPE
TYPE: SURGICAL PAIN

## 2018-09-29 ASSESSMENT — PAIN DESCRIPTION - ORIENTATION
ORIENTATION: LEFT

## 2018-09-29 ASSESSMENT — PAIN DESCRIPTION - LOCATION
LOCATION: HIP

## 2018-09-29 ASSESSMENT — PAIN DESCRIPTION - DESCRIPTORS
DESCRIPTORS: ACHING

## 2018-09-29 ASSESSMENT — PAIN DESCRIPTION - PROGRESSION: CLINICAL_PROGRESSION: GRADUALLY WORSENING

## 2018-09-29 NOTE — PROGRESS NOTES
Pt resting in recliner comfortably. A&O X4. Admitted on 9/27/2018 with Hip fracture, left, closed, with routine healing, subsequent encounter [S72.002D]. VSS. HR regular. Patient denies any palpitation, chest pain, or chest tightness. Lungs sounds clear/diminished. Abd soft and nontender. Active bowel sounds. Nephrostomy and colostomy draining well and intact. Left hip dressing dry and intact. Lance hose removed and washed. HS medication given. Tolerated well. Call light in reach. Patient instructed to call if there is any needs or changes.

## 2018-09-29 NOTE — PROGRESS NOTES
repair with L hip hemiarthroplasty on 9/26/18. She was transfused 1 unit PRBC POD #1 due to hgb of 7.8 with hypotension. The patient was found to have UC + for Providencia rettgeri and Providencia stuartii. She was treated with Rocephin. She does have bilateral nephrostomy tubes and a hx of recurrent UTI. Abx are to be discontinued today after rocephin, as + Urine culture is suspected to be colonization. Response To Previous Treatment: Patient with no complaints from previous session. Family / Caregiver Present: No  Referring Practitioner: Dr. Roc Harrison  Subjective  Subjective: Pt pleasant and agreeable to PT treatment. States she just finished with OT. Pain Screening  Patient Currently in Pain: Yes  Pain Assessment  Pain Assessment: 0-10  Pain Level: 7  Pain Type: Surgical pain  Pain Location: Hip  Pain Orientation: Left  Pain Descriptors: Aching  Pain Frequency: Intermittent  Pain Onset: On-going  Pain Intervention(s): Repositioned; Ambulation/Increased activity  Response to Pain Intervention: Patient Satisfied  Vital Signs  Patient Currently in Pain: Yes          Objective   Bed mobility  Supine to Sit: Moderate assistance (assist with trunk and LLE)  Sit to Supine: Minimal assistance (assist with LLE)  Scooting: Minimal assistance  Comment: flat therapy mat, no rails; extra time due to pain     Transfers  Sit to Stand: Contact guard assistance  Stand to sit: Contact guard assistance  Bed to Chair: Contact guard assistance  Comment: VCs for hand placement during transfers; extra time     Ambulation  Ambulation?: Yes  WB Status: WBAT LLE  More Ambulation?: No  Ambulation 1  Surface: level tile  Device: Rolling Walker  Assistance: Contact guard assistance  Quality of Gait: step to pattern with fair heel contact and fair weight bearing  Distance: 40'x2; 20'x2 to and from curb step and again to and from staircase; short distances to and from therapy mat  Comments: overall steady on the walker; VCs for

## 2018-09-29 NOTE — PROGRESS NOTES
Yes  Patient assessed for rehabilitation services?: Yes  Additional Pertinent Hx: Per Dr. Marleny Myers Note: Pt is \"63 year old,  female, with a past medical history significant for colorectal cancer with mets to the liver and lung, vesicovaginal fistula s/p bilateral percutaneous nephrostomy tubes and COPD, who presented to hospital after a fall one week earlier. The patient presented with L hip pain. Xrays revealed a L femoral fracture and was admitted for further treatment. Ortho was consulted and the patient underwent surgical repair with L hip hemiarthroplasty on 9/26/18. \"  Response to previous treatment: Patient with no complaints from previous session  Family / Caregiver Present: No  Referring Practitioner: David   Diagnosis: s/p left hip hemiarthroplasty  Subjective  Subjective: Patient met in room for OT session, agreeable to therapy  Pain Assessment  Patient Currently in Pain: Yes  Pain Assessment: 0-10  Pain Level: 8  Pain Type: Surgical pain  Pain Location: Hip  Pain Orientation: Left  Pain Intervention(s): Repositioned  Vital Signs  Patient Currently in Pain: Yes   Orientation  Orientation  Overall Orientation Status: Within Functional Limits  Objective    ADL  Equipment Provided: Reacher;Sock aid;Long-handled shoe horn;Long-handled sponge  Grooming: Setup (seated in front of sink to wash face, hands and brush teeth)  UE Bathing: Setup;Supervision (seated in front of sink)  LE Bathing: Setup;Contact guard assistance (CGA for balance to wash tamara area and bottom, use of long sponge for LE's with cues)  UE Dressing: Setup  LE Dressing: Setup;Minimal assistance (with use of reacher able to thread briefs and pants over feet with Min A and CGA for balance for over hips, shoes with Min A)  Toileting: Minimal assistance (able to empty neph tubes with assist to empty into commode)  Additional Comments: Patient required cues for hip precautions, declined shower this date        Balance  Sitting Balance:

## 2018-09-30 LAB
BASOPHILS ABSOLUTE: 0 K/UL (ref 0–0.2)
BASOPHILS RELATIVE PERCENT: 0.5 %
EOSINOPHILS ABSOLUTE: 0.1 K/UL (ref 0–0.6)
EOSINOPHILS RELATIVE PERCENT: 2.5 %
HCT VFR BLD CALC: 22.6 % (ref 36–48)
HEMOGLOBIN: 7.5 G/DL (ref 12–16)
LYMPHOCYTES ABSOLUTE: 0.4 K/UL (ref 1–5.1)
LYMPHOCYTES RELATIVE PERCENT: 8.4 %
MCH RBC QN AUTO: 32.5 PG (ref 26–34)
MCHC RBC AUTO-ENTMCNC: 33 G/DL (ref 31–36)
MCV RBC AUTO: 98.4 FL (ref 80–100)
MONOCYTES ABSOLUTE: 0.5 K/UL (ref 0–1.3)
MONOCYTES RELATIVE PERCENT: 9.8 %
NEUTROPHILS ABSOLUTE: 3.8 K/UL (ref 1.7–7.7)
NEUTROPHILS RELATIVE PERCENT: 78.8 %
PDW BLD-RTO: 16.1 % (ref 12.4–15.4)
PLATELET # BLD: 167 K/UL (ref 135–450)
PMV BLD AUTO: 7.7 FL (ref 5–10.5)
RBC # BLD: 2.3 M/UL (ref 4–5.2)
WBC # BLD: 4.9 K/UL (ref 4–11)

## 2018-09-30 PROCEDURE — 6360000002 HC RX W HCPCS: Performed by: PHYSICAL MEDICINE & REHABILITATION

## 2018-09-30 PROCEDURE — 94760 N-INVAS EAR/PLS OXIMETRY 1: CPT

## 2018-09-30 PROCEDURE — 2580000003 HC RX 258: Performed by: PHYSICAL MEDICINE & REHABILITATION

## 2018-09-30 PROCEDURE — 6370000000 HC RX 637 (ALT 250 FOR IP): Performed by: PHYSICAL MEDICINE & REHABILITATION

## 2018-09-30 PROCEDURE — 85025 COMPLETE CBC W/AUTO DIFF WBC: CPT

## 2018-09-30 PROCEDURE — 1280000000 HC REHAB R&B

## 2018-09-30 RX ADMIN — Medication 10 ML: at 09:33

## 2018-09-30 RX ADMIN — HEPARIN SODIUM 5000 UNITS: 5000 INJECTION INTRAVENOUS; SUBCUTANEOUS at 20:07

## 2018-09-30 RX ADMIN — METHADONE HYDROCHLORIDE 10 MG: 10 TABLET ORAL at 09:31

## 2018-09-30 RX ADMIN — POTASSIUM CHLORIDE 20 MEQ: 20 TABLET, EXTENDED RELEASE ORAL at 09:28

## 2018-09-30 RX ADMIN — ASPIRIN 81 MG: 81 TABLET, COATED ORAL at 09:28

## 2018-09-30 RX ADMIN — HEPARIN SODIUM 5000 UNITS: 5000 INJECTION INTRAVENOUS; SUBCUTANEOUS at 13:48

## 2018-09-30 RX ADMIN — Medication 400 MG: at 09:28

## 2018-09-30 RX ADMIN — METOPROLOL SUCCINATE 50 MG: 50 TABLET, EXTENDED RELEASE ORAL at 09:28

## 2018-09-30 RX ADMIN — Medication 10 ML: at 20:08

## 2018-09-30 RX ADMIN — HEPARIN SODIUM 5000 UNITS: 5000 INJECTION INTRAVENOUS; SUBCUTANEOUS at 06:16

## 2018-09-30 RX ADMIN — OXYCODONE HYDROCHLORIDE 40 MG: 10 TABLET ORAL at 18:15

## 2018-09-30 RX ADMIN — Medication 1 CAPSULE: at 16:34

## 2018-09-30 RX ADMIN — OXYCODONE HYDROCHLORIDE 40 MG: 10 TABLET ORAL at 22:29

## 2018-09-30 RX ADMIN — MIRTAZAPINE 15 MG: 15 TABLET, FILM COATED ORAL at 20:08

## 2018-09-30 RX ADMIN — Medication 1 CAPSULE: at 09:28

## 2018-09-30 RX ADMIN — CALCIUM CARBONATE-VITAMIN D TAB 500 MG-200 UNIT 1 TABLET: 500-200 TAB at 20:08

## 2018-09-30 RX ADMIN — OXYCODONE HYDROCHLORIDE 40 MG: 10 TABLET ORAL at 13:48

## 2018-09-30 RX ADMIN — ATORVASTATIN CALCIUM 20 MG: 20 TABLET, FILM COATED ORAL at 20:08

## 2018-09-30 RX ADMIN — OXYCODONE HYDROCHLORIDE 40 MG: 10 TABLET ORAL at 06:21

## 2018-09-30 RX ADMIN — CALCIUM CARBONATE-VITAMIN D TAB 500 MG-200 UNIT 1 TABLET: 500-200 TAB at 09:28

## 2018-09-30 RX ADMIN — Medication 400 MG: at 20:08

## 2018-09-30 RX ADMIN — NORTRIPTYLINE HYDROCHLORIDE 75 MG: 25 CAPSULE ORAL at 21:00

## 2018-09-30 ASSESSMENT — PAIN SCALES - GENERAL
PAINLEVEL_OUTOF10: 0
PAINLEVEL_OUTOF10: 7
PAINLEVEL_OUTOF10: 8
PAINLEVEL_OUTOF10: 5
PAINLEVEL_OUTOF10: 7
PAINLEVEL_OUTOF10: 8
PAINLEVEL_OUTOF10: 7
PAINLEVEL_OUTOF10: 6

## 2018-09-30 ASSESSMENT — PAIN DESCRIPTION - ORIENTATION
ORIENTATION: LEFT

## 2018-09-30 ASSESSMENT — PAIN DESCRIPTION - ONSET
ONSET: ON-GOING
ONSET: ON-GOING
ONSET: GRADUAL
ONSET: ON-GOING

## 2018-09-30 ASSESSMENT — PAIN DESCRIPTION - PROGRESSION
CLINICAL_PROGRESSION: NOT CHANGED

## 2018-09-30 ASSESSMENT — PAIN DESCRIPTION - PAIN TYPE
TYPE: SURGICAL PAIN

## 2018-09-30 ASSESSMENT — PAIN DESCRIPTION - FREQUENCY
FREQUENCY: INTERMITTENT

## 2018-09-30 ASSESSMENT — PAIN DESCRIPTION - LOCATION
LOCATION: HIP

## 2018-09-30 ASSESSMENT — PAIN DESCRIPTION - DESCRIPTORS
DESCRIPTORS: ACHING
DESCRIPTORS: ACHING

## 2018-09-30 NOTE — PLAN OF CARE
Problem: Falls - Risk of:  Goal: Will remain free from falls  Will remain free from falls   Outcome: Ongoing  Pt.risk for fall. Fall precaution in place. Non skid footwear at all times. Bed and chair alarm in place. Call light within reach. Problem: Risk for Impaired Skin Integrity  Goal: Tissue integrity - skin and mucous membranes  Structural intactness and normal physiological function of skin and  mucous membranes. Outcome: Ongoing  Encouraged to turn and reposition q 2 hours. Cues for bladder program.Heels off bed. Continue skin assessment this shift. Problem: Pain:  Goal: Pain level will decrease  Pain level will decrease   Outcome: Ongoing  Pt.able to verbalize the presence and absence of pain. PRN medications administered as needed.

## 2018-10-01 LAB
ABO/RH: NORMAL
ANION GAP SERPL CALCULATED.3IONS-SCNC: 6 MMOL/L (ref 3–16)
ANTIBODY SCREEN: NORMAL
BASOPHILS ABSOLUTE: 0 K/UL (ref 0–0.2)
BASOPHILS RELATIVE PERCENT: 0.5 %
BLOOD BANK DISPENSE STATUS: NORMAL
BLOOD BANK DISPENSE STATUS: NORMAL
BLOOD BANK PRODUCT CODE: NORMAL
BLOOD BANK PRODUCT CODE: NORMAL
BPU ID: NORMAL
BPU ID: NORMAL
BUN BLDV-MCNC: 17 MG/DL (ref 7–20)
CALCIUM SERPL-MCNC: 8.7 MG/DL (ref 8.3–10.6)
CHLORIDE BLD-SCNC: 100 MMOL/L (ref 99–110)
CO2: 26 MMOL/L (ref 21–32)
CREAT SERPL-MCNC: 1.2 MG/DL (ref 0.6–1.2)
DESCRIPTION BLOOD BANK: NORMAL
DESCRIPTION BLOOD BANK: NORMAL
EOSINOPHILS ABSOLUTE: 0.1 K/UL (ref 0–0.6)
EOSINOPHILS RELATIVE PERCENT: 2.2 %
GFR AFRICAN AMERICAN: 55
GFR NON-AFRICAN AMERICAN: 46
GLUCOSE BLD-MCNC: 124 MG/DL (ref 70–99)
HCT VFR BLD CALC: 20.8 % (ref 36–48)
HEMOGLOBIN: 6.9 G/DL (ref 12–16)
LYMPHOCYTES ABSOLUTE: 0.4 K/UL (ref 1–5.1)
LYMPHOCYTES RELATIVE PERCENT: 8.3 %
MCH RBC QN AUTO: 32.3 PG (ref 26–34)
MCHC RBC AUTO-ENTMCNC: 33.1 G/DL (ref 31–36)
MCV RBC AUTO: 97.6 FL (ref 80–100)
MONOCYTES ABSOLUTE: 0.5 K/UL (ref 0–1.3)
MONOCYTES RELATIVE PERCENT: 10.5 %
NEUTROPHILS ABSOLUTE: 3.9 K/UL (ref 1.7–7.7)
NEUTROPHILS RELATIVE PERCENT: 78.5 %
PDW BLD-RTO: 16.2 % (ref 12.4–15.4)
PLATELET # BLD: 176 K/UL (ref 135–450)
PMV BLD AUTO: 7.3 FL (ref 5–10.5)
POTASSIUM SERPL-SCNC: 5.7 MMOL/L (ref 3.5–5.1)
RBC # BLD: 2.13 M/UL (ref 4–5.2)
REASON FOR REJECTION: NORMAL
REJECTED TEST: NORMAL
SODIUM BLD-SCNC: 132 MMOL/L (ref 136–145)
WBC # BLD: 4.9 K/UL (ref 4–11)

## 2018-10-01 PROCEDURE — 97530 THERAPEUTIC ACTIVITIES: CPT | Performed by: PHYSICAL THERAPIST

## 2018-10-01 PROCEDURE — P9016 RBC LEUKOCYTES REDUCED: HCPCS

## 2018-10-01 PROCEDURE — 36591 DRAW BLOOD OFF VENOUS DEVICE: CPT

## 2018-10-01 PROCEDURE — 2580000003 HC RX 258: Performed by: PHYSICAL MEDICINE & REHABILITATION

## 2018-10-01 PROCEDURE — 86923 COMPATIBILITY TEST ELECTRIC: CPT

## 2018-10-01 PROCEDURE — 6370000000 HC RX 637 (ALT 250 FOR IP): Performed by: PHYSICAL MEDICINE & REHABILITATION

## 2018-10-01 PROCEDURE — 80048 BASIC METABOLIC PNL TOTAL CA: CPT

## 2018-10-01 PROCEDURE — 6360000002 HC RX W HCPCS: Performed by: PHYSICAL MEDICINE & REHABILITATION

## 2018-10-01 PROCEDURE — 97535 SELF CARE MNGMENT TRAINING: CPT

## 2018-10-01 PROCEDURE — 86850 RBC ANTIBODY SCREEN: CPT

## 2018-10-01 PROCEDURE — 97110 THERAPEUTIC EXERCISES: CPT | Performed by: PHYSICAL THERAPIST

## 2018-10-01 PROCEDURE — 97530 THERAPEUTIC ACTIVITIES: CPT

## 2018-10-01 PROCEDURE — 97116 GAIT TRAINING THERAPY: CPT | Performed by: PHYSICAL THERAPIST

## 2018-10-01 PROCEDURE — 86901 BLOOD TYPING SEROLOGIC RH(D): CPT

## 2018-10-01 PROCEDURE — 1280000000 HC REHAB R&B

## 2018-10-01 PROCEDURE — 85025 COMPLETE CBC W/AUTO DIFF WBC: CPT

## 2018-10-01 PROCEDURE — 97110 THERAPEUTIC EXERCISES: CPT

## 2018-10-01 PROCEDURE — 86900 BLOOD TYPING SEROLOGIC ABO: CPT

## 2018-10-01 PROCEDURE — 36430 TRANSFUSION BLD/BLD COMPNT: CPT

## 2018-10-01 RX ORDER — 0.9 % SODIUM CHLORIDE 0.9 %
250 INTRAVENOUS SOLUTION INTRAVENOUS ONCE
Status: COMPLETED | OUTPATIENT
Start: 2018-10-01 | End: 2018-10-02

## 2018-10-01 RX ADMIN — ATORVASTATIN CALCIUM 20 MG: 20 TABLET, FILM COATED ORAL at 22:03

## 2018-10-01 RX ADMIN — SENNOSIDES AND DOCUSATE SODIUM 1 TABLET: 8.6; 5 TABLET ORAL at 08:12

## 2018-10-01 RX ADMIN — OXYCODONE HYDROCHLORIDE 40 MG: 10 TABLET ORAL at 22:16

## 2018-10-01 RX ADMIN — POTASSIUM CHLORIDE 20 MEQ: 20 TABLET, EXTENDED RELEASE ORAL at 08:12

## 2018-10-01 RX ADMIN — OXYCODONE HYDROCHLORIDE 40 MG: 10 TABLET ORAL at 18:21

## 2018-10-01 RX ADMIN — ASPIRIN 81 MG: 81 TABLET, COATED ORAL at 08:12

## 2018-10-01 RX ADMIN — Medication 400 MG: at 22:03

## 2018-10-01 RX ADMIN — OXYCODONE HYDROCHLORIDE 40 MG: 10 TABLET ORAL at 11:51

## 2018-10-01 RX ADMIN — METOPROLOL SUCCINATE 50 MG: 50 TABLET, EXTENDED RELEASE ORAL at 08:12

## 2018-10-01 RX ADMIN — HEPARIN SODIUM 5000 UNITS: 5000 INJECTION INTRAVENOUS; SUBCUTANEOUS at 05:44

## 2018-10-01 RX ADMIN — CALCIUM CARBONATE-VITAMIN D TAB 500 MG-200 UNIT 1 TABLET: 500-200 TAB at 08:11

## 2018-10-01 RX ADMIN — SODIUM CHLORIDE 250 ML: 9 INJECTION, SOLUTION INTRAVENOUS at 11:55

## 2018-10-01 RX ADMIN — CALCIUM CARBONATE-VITAMIN D TAB 500 MG-200 UNIT 1 TABLET: 500-200 TAB at 22:03

## 2018-10-01 RX ADMIN — HEPARIN SODIUM 5000 UNITS: 5000 INJECTION INTRAVENOUS; SUBCUTANEOUS at 22:02

## 2018-10-01 RX ADMIN — Medication 1 CAPSULE: at 08:12

## 2018-10-01 RX ADMIN — OXYCODONE HYDROCHLORIDE 40 MG: 10 TABLET ORAL at 04:24

## 2018-10-01 RX ADMIN — Medication 10 ML: at 08:11

## 2018-10-01 RX ADMIN — Medication 400 MG: at 08:12

## 2018-10-01 RX ADMIN — NORTRIPTYLINE HYDROCHLORIDE 75 MG: 25 CAPSULE ORAL at 22:03

## 2018-10-01 RX ADMIN — Medication 10 ML: at 21:00

## 2018-10-01 RX ADMIN — MIRTAZAPINE 15 MG: 15 TABLET, FILM COATED ORAL at 22:03

## 2018-10-01 RX ADMIN — METHADONE HYDROCHLORIDE 10 MG: 10 TABLET ORAL at 08:11

## 2018-10-01 RX ADMIN — Medication 1 CAPSULE: at 17:26

## 2018-10-01 RX ADMIN — HEPARIN SODIUM 5000 UNITS: 5000 INJECTION INTRAVENOUS; SUBCUTANEOUS at 15:04

## 2018-10-01 RX ADMIN — SENNOSIDES AND DOCUSATE SODIUM 1 TABLET: 8.6; 5 TABLET ORAL at 22:03

## 2018-10-01 ASSESSMENT — PAIN SCALES - GENERAL
PAINLEVEL_OUTOF10: 6
PAINLEVEL_OUTOF10: 6
PAINLEVEL_OUTOF10: 2
PAINLEVEL_OUTOF10: 6
PAINLEVEL_OUTOF10: 7
PAINLEVEL_OUTOF10: 7

## 2018-10-01 ASSESSMENT — PAIN DESCRIPTION - PAIN TYPE
TYPE: ACUTE PAIN
TYPE: ACUTE PAIN;SURGICAL PAIN
TYPE: SURGICAL PAIN

## 2018-10-01 ASSESSMENT — PAIN DESCRIPTION - DESCRIPTORS
DESCRIPTORS: ACHING

## 2018-10-01 ASSESSMENT — PAIN DESCRIPTION - LOCATION
LOCATION: HIP

## 2018-10-01 ASSESSMENT — PAIN DESCRIPTION - ORIENTATION
ORIENTATION: LEFT

## 2018-10-01 ASSESSMENT — PAIN DESCRIPTION - FREQUENCY
FREQUENCY: INTERMITTENT

## 2018-10-01 ASSESSMENT — PAIN DESCRIPTION - PROGRESSION
CLINICAL_PROGRESSION: NOT CHANGED
CLINICAL_PROGRESSION: NOT CHANGED

## 2018-10-01 ASSESSMENT — PAIN DESCRIPTION - ONSET
ONSET: ON-GOING
ONSET: ON-GOING

## 2018-10-01 NOTE — PROGRESS NOTES
5-6x  Times per day: Twice a day  Plan weeks: 7-10 days  Specific instructions for Next Treatment: increase activity as tolerated  Current Treatment Recommendations: Strengthening, ROM, Balance Training, Functional Mobility Training, Transfer Training, ADL/Self-care Training, Endurance Training, Gait Training, Stair training, Pain Management, Home Exercise Program, Safety Education & Training, Patient/Caregiver Education & Training, Equipment Evaluation, Education, & procurement, Positioning  Safety Devices  Type of devices:  All fall risk precautions in place, Gait belt (Patient awaiting OT)  Restraints  Initially in place: No     Therapy Time   Individual Concurrent Group Co-treatment   Time In 945         Time Out 1030         Minutes 45         Second Session Therapy Time     Individual Co-treatment   Time In 1615     Time Out 1645     Minutes 30     Treatment variance 15 minutes receiving blood  Sonali Husain, PT # [id number]

## 2018-10-02 LAB
HCT VFR BLD CALC: 26.2 % (ref 36–48)
HEMOGLOBIN: 9.1 G/DL (ref 12–16)
MCH RBC QN AUTO: 32.7 PG (ref 26–34)
MCHC RBC AUTO-ENTMCNC: 34.5 G/DL (ref 31–36)
MCV RBC AUTO: 94.7 FL (ref 80–100)
PDW BLD-RTO: 15.8 % (ref 12.4–15.4)
PLATELET # BLD: 165 K/UL (ref 135–450)
PMV BLD AUTO: 7 FL (ref 5–10.5)
RBC # BLD: 2.77 M/UL (ref 4–5.2)
WBC # BLD: 5 K/UL (ref 4–11)

## 2018-10-02 PROCEDURE — 36591 DRAW BLOOD OFF VENOUS DEVICE: CPT

## 2018-10-02 PROCEDURE — 97530 THERAPEUTIC ACTIVITIES: CPT

## 2018-10-02 PROCEDURE — 6360000002 HC RX W HCPCS: Performed by: PHYSICAL MEDICINE & REHABILITATION

## 2018-10-02 PROCEDURE — 97110 THERAPEUTIC EXERCISES: CPT

## 2018-10-02 PROCEDURE — 1280000000 HC REHAB R&B

## 2018-10-02 PROCEDURE — 97116 GAIT TRAINING THERAPY: CPT

## 2018-10-02 PROCEDURE — 85027 COMPLETE CBC AUTOMATED: CPT

## 2018-10-02 PROCEDURE — 6370000000 HC RX 637 (ALT 250 FOR IP): Performed by: PHYSICAL MEDICINE & REHABILITATION

## 2018-10-02 PROCEDURE — 94761 N-INVAS EAR/PLS OXIMETRY MLT: CPT

## 2018-10-02 PROCEDURE — 2580000003 HC RX 258: Performed by: PHYSICAL MEDICINE & REHABILITATION

## 2018-10-02 PROCEDURE — 97535 SELF CARE MNGMENT TRAINING: CPT

## 2018-10-02 RX ADMIN — OXYCODONE HYDROCHLORIDE 40 MG: 10 TABLET ORAL at 15:54

## 2018-10-02 RX ADMIN — MIRTAZAPINE 15 MG: 15 TABLET, FILM COATED ORAL at 21:54

## 2018-10-02 RX ADMIN — OXYCODONE HYDROCHLORIDE 40 MG: 10 TABLET ORAL at 21:55

## 2018-10-02 RX ADMIN — HEPARIN SODIUM 5000 UNITS: 5000 INJECTION INTRAVENOUS; SUBCUTANEOUS at 15:53

## 2018-10-02 RX ADMIN — NORTRIPTYLINE HYDROCHLORIDE 75 MG: 25 CAPSULE ORAL at 21:54

## 2018-10-02 RX ADMIN — Medication 400 MG: at 09:10

## 2018-10-02 RX ADMIN — HEPARIN SODIUM 5000 UNITS: 5000 INJECTION INTRAVENOUS; SUBCUTANEOUS at 07:09

## 2018-10-02 RX ADMIN — Medication 400 MG: at 21:54

## 2018-10-02 RX ADMIN — METOPROLOL SUCCINATE 50 MG: 50 TABLET, EXTENDED RELEASE ORAL at 09:10

## 2018-10-02 RX ADMIN — CALCIUM CARBONATE-VITAMIN D TAB 500 MG-200 UNIT 1 TABLET: 500-200 TAB at 09:10

## 2018-10-02 RX ADMIN — CALCIUM CARBONATE-VITAMIN D TAB 500 MG-200 UNIT 1 TABLET: 500-200 TAB at 21:54

## 2018-10-02 RX ADMIN — ASPIRIN 81 MG: 81 TABLET, COATED ORAL at 09:10

## 2018-10-02 RX ADMIN — Medication 10 ML: at 22:29

## 2018-10-02 RX ADMIN — Medication 1 CAPSULE: at 10:14

## 2018-10-02 RX ADMIN — METHADONE HYDROCHLORIDE 10 MG: 10 TABLET ORAL at 09:10

## 2018-10-02 RX ADMIN — OXYCODONE HYDROCHLORIDE 40 MG: 10 TABLET ORAL at 10:15

## 2018-10-02 RX ADMIN — Medication 10 ML: at 09:09

## 2018-10-02 RX ADMIN — OXYCODONE HYDROCHLORIDE 40 MG: 10 TABLET ORAL at 05:02

## 2018-10-02 RX ADMIN — HEPARIN SODIUM 5000 UNITS: 5000 INJECTION INTRAVENOUS; SUBCUTANEOUS at 21:55

## 2018-10-02 RX ADMIN — ATORVASTATIN CALCIUM 20 MG: 20 TABLET, FILM COATED ORAL at 21:54

## 2018-10-02 RX ADMIN — Medication 1 CAPSULE: at 17:23

## 2018-10-02 RX ADMIN — SENNOSIDES AND DOCUSATE SODIUM 1 TABLET: 8.6; 5 TABLET ORAL at 09:10

## 2018-10-02 ASSESSMENT — PAIN DESCRIPTION - FREQUENCY
FREQUENCY: INTERMITTENT
FREQUENCY: INTERMITTENT

## 2018-10-02 ASSESSMENT — PAIN DESCRIPTION - PROGRESSION: CLINICAL_PROGRESSION: NOT CHANGED

## 2018-10-02 ASSESSMENT — PAIN DESCRIPTION - DESCRIPTORS
DESCRIPTORS: ACHING
DESCRIPTORS: ACHING

## 2018-10-02 ASSESSMENT — PAIN DESCRIPTION - PAIN TYPE
TYPE: ACUTE PAIN;SURGICAL PAIN
TYPE: ACUTE PAIN
TYPE: ACUTE PAIN;SURGICAL PAIN
TYPE: CHRONIC PAIN
TYPE: ACUTE PAIN;SURGICAL PAIN
TYPE: ACUTE PAIN

## 2018-10-02 ASSESSMENT — PAIN DESCRIPTION - ORIENTATION
ORIENTATION: LEFT

## 2018-10-02 ASSESSMENT — PAIN SCALES - GENERAL
PAINLEVEL_OUTOF10: 6
PAINLEVEL_OUTOF10: 5
PAINLEVEL_OUTOF10: 8
PAINLEVEL_OUTOF10: 7
PAINLEVEL_OUTOF10: 7
PAINLEVEL_OUTOF10: 5
PAINLEVEL_OUTOF10: 0
PAINLEVEL_OUTOF10: 7

## 2018-10-02 ASSESSMENT — PAIN DESCRIPTION - ONSET
ONSET: ON-GOING
ONSET: ON-GOING

## 2018-10-02 ASSESSMENT — PAIN DESCRIPTION - LOCATION
LOCATION: BUTTOCKS
LOCATION: HIP

## 2018-10-02 NOTE — PROGRESS NOTES
[facility] Progress Note    Patient Identification:  Conard Epley  [id number]  : 1955  Admit date: 2018      Diagnosis:   Patient Active Problem List   Diagnosis    Adjustment reaction with prolonged depressive reaction    Asthma    Radiation proctitis    Essential hypertension    [address]ostomy status ([address]    Cervical cancer ([address]    Proctodynia    Malignant neoplasm of sigmoid colon ([address]    Tobacco dependence    Asymptomatic gallstones    Metastasis to liver (HCC)    Insomnia    Urinary incontinence    Vaginal discharge    History of radiation therapy    Metastatic adenocarcinoma to lung (HCC)    Chemotherapy-induced neuropathy (HCC)    Constipation due to pain medication    Rectovaginal fistula    Vesico-vaginal fistula    Cancer associated pain    Nephrostomy status (HCC)    Chronic kidney disease, stage III (moderate) (HCC)    Mild protein-calorie malnutrition (HCC)    Anemia, iron deficiency    Right leg claudication ([address]    Right iliac artery stenosis (HCC)    History of hypertension resolved with weight loss    Hypokalemia    Amputated toe of right foot ([address] 4th toe due to ischemia    Closed fracture of neck of left femur ([address]    Closed displaced fracture of left femoral neck (HCC)    Tachycardia    Hip fracture, left, closed, with routine healing, subsequent encounter           Subjective: Pt seen this AM. Patient Is feeling better this morning. Repeat labs show that her H/H has improved after her transfusion yesterday. Her potassium level is high this morning, so I will discontinue her potassium pills and recheck it tomorrow. Her pain medication is keeping her left hip pain under fairly good control. The patient was also seen by oncology today, no further intervention is needed at this time for her cancer treatment.        /66   Pulse 102   Temp 97.4 °F (36.3 °C) (Oral)   Resp 16   Ht 5' 5\" (1.651 m)

## 2018-10-02 NOTE — PROGRESS NOTES
bilateral percutaneous nephrostomies, colostomy     Subjective   General  Chart Reviewed: Yes  Patient assessed for rehabilitation services?: Yes  Additional Pertinent Hx: Per Dr. Sapna Navas Note: Pt is \"63 year old,  female, with a past medical history significant for colorectal cancer with mets to the liver and lung, vesicovaginal fistula s/p bilateral percutaneous nephrostomy tubes and COPD, who presented to hospital after a fall one week earlier. The patient presented with L hip pain. Xrays revealed a L femoral fracture and was admitted for further treatment. Ortho was consulted and the patient underwent surgical repair with L hip hemiarthroplasty on 9/26/18. \"  Response to previous treatment: Patient with no complaints from previous session  Family / Caregiver Present: No  Referring Practitioner: David   Diagnosis: s/p left hip hemiarthroplasty  Subjective  Subjective: Pt met in room, agreeable for sponge bath. Pain Assessment  Patient Currently in Pain: Denies  Vital Signs  Patient Currently in Pain: Denies        Objective    ADL  Equipment Provided: Reacher;Sock aid;Long-handled shoe horn;Long-handled sponge  Feeding: Modified independent   Grooming: Supervision (Pt completed hand/face washing, oral care and hair care while seated at sink with SPV. )  UE Bathing: Supervision (Pt completed while seated at sink. )  LE Bathing: Stand by assistance;Setup (Pt used long handled sponge for lower legs and feet. Stood to complete pericare and thigh areas. SBA provided with support at sink. )  UE Dressing: Setup;Supervision (Completed while seated in chair )  LE Dressing: Stand by assistance;Setup (Pt managed clothing down with SBA, doffed over feet while seated with reacher. Pt doffed socks with reacher. Pt threaded underwear and pants with reacher with no assist. Managed clothing up with SBA.  Assist for L KEV hose; Assist for L shoe for shoe string. )  Toileting: Setup (Pt emptied colostomy and nephrostomy bags

## 2018-10-02 NOTE — CONSULTS
Labs:  CBC with Differential:      Lab Results   Component Value Date    WBC 5.0 10/02/2018    RBC 2.77 10/02/2018    RBC 2.25 08/08/2017    HGB 9.1 10/02/2018    HCT 26.2 10/02/2018     10/02/2018    MCV 94.7 10/02/2018    MCH 32.7 10/02/2018    MCHC 34.5 10/02/2018    RDW 15.8 10/02/2018    BANDSPCT 6 03/27/2016    LYMPHOPCT 8.3 10/01/2018    LYMPHOPCT 24.2 08/08/2017    MONOPCT 10.5 10/01/2018    BASOPCT 0.5 10/01/2018    MONOSABS 0.5 10/01/2018    LYMPHSABS 0.4 10/01/2018    EOSABS 0.1 10/01/2018     CMP:   Recent Labs      09/30/18   0419  10/01/18   0434  10/02/18   0514   WBC  4.9  4.9  5.0   HGB  7.5*  6.9*  9.1*   HCT  22.6*  20.8*  26.2*   PLT  167  176  165      Recent Labs      10/01/18   2045   NA  132*   K  5.7*   CL  100   CO2  26   CALCIUM  8.7   BUN  17   CREATININE  1.2     No results for input(s): AST, ALB, BILIDIR, BILITOT, ALKPHOS in the last 72 hours. Lab Results   Component Value Date     10/01/2018    K 5.7 10/01/2018    K 3.5 09/27/2018     10/01/2018    CO2 26 10/01/2018    BUN 17 10/01/2018    CREATININE 1.2 10/01/2018    GFRAA 55 10/01/2018    GFRAA >60 04/25/2011    AGRATIO 0.6 09/24/2018    LABGLOM 46 10/01/2018    GLUCOSE 124 10/01/2018    GLUCOSE 107 08/08/2017    PROT 6.1 09/24/2018    PROT 5.8 08/08/2017    CALCIUM 8.7 10/01/2018    BILITOT 0.3 09/24/2018    ALKPHOS 258 09/24/2018    AST 16 09/24/2018    ALT 10 09/24/2018     LDH: No results found for: LDH  PT/INR: No results found for: PTINR  PTT:   Lab Results   Component Value Date    APTT 37.0 09/24/2018         IMAGING:       Xr Hip Left (2-3 Views)    Result Date: 9/26/2018  EXAMINATION: 2 XRAY VIEWS OF THE LEFT HIP 9/26/2018 9:38 am COMPARISON: None. HISTORY: ORDERING SYSTEM PROVIDED HISTORY: Left hip hemiarthroplasty. TECHNOLOGIST PROVIDED HISTORY: Of operative side while in recovery room. Reason for exam:->Left hip hemiarthroplasty.  Ordering Physician Provided Reason for Exam: post op left hip Acuity: in keeping with pulmonary metastatic disease.        IMPRESSION:                    PLAN:     Metastatic colon cancer  She is on supportive care   In relation to the notes-it appears pt has had about every chemo mpossilbe and dr Amy Lord last note stated she will be on supportive care-  While in rehab I dont have any active intervention to recommend-pt can fu with dr Garg Pap

## 2018-10-02 NOTE — PLAN OF CARE
Problem: Falls - Risk of:  Goal: Will remain free from falls  Will remain free from falls   Outcome: Ongoing  Patient is a medium fall risk. Patient free of falls this shift. Bed low and locked at all times. Call light and bedside table is within reach. Discussed with patient the need to call out for assistance before getting up when if needed. Patient verbalized understanding. Problem: Risk for Impaired Skin Integrity  Goal: Tissue integrity - skin and mucous membranes  Structural intactness and normal physiological function of skin and  mucous membranes. Outcome: Ongoing  Patient is at risk for impaired skin integrity. Patient is able to turn and reposition self as needed. Nurse offered to turn and reposition patient every two hours. Skin is assessed every shift and prn. Will continue to monitor. Problem: Pain:  Goal: Pain level will decrease  Pain level will decrease   Outcome: Ongoing  Provided patient with pain medicine per request  for left hip pain. Patient stated pain is a 7 out of 10. Patient is using Oxycodone for pain management. Pain was reassessed within 30 minutes of pain medication administration, patient was sleeping quietly with RR of 16.

## 2018-10-03 LAB
ANION GAP SERPL CALCULATED.3IONS-SCNC: 8 MMOL/L (ref 3–16)
BUN BLDV-MCNC: 14 MG/DL (ref 7–20)
CALCIUM SERPL-MCNC: 8.5 MG/DL (ref 8.3–10.6)
CHLORIDE BLD-SCNC: 100 MMOL/L (ref 99–110)
CO2: 25 MMOL/L (ref 21–32)
CREAT SERPL-MCNC: 1 MG/DL (ref 0.6–1.2)
GFR AFRICAN AMERICAN: >60
GFR NON-AFRICAN AMERICAN: 56
GLUCOSE BLD-MCNC: 91 MG/DL (ref 70–99)
HCT VFR BLD CALC: 26.8 % (ref 36–48)
HEMOGLOBIN: 9 G/DL (ref 12–16)
MCH RBC QN AUTO: 31.7 PG (ref 26–34)
MCHC RBC AUTO-ENTMCNC: 33.6 G/DL (ref 31–36)
MCV RBC AUTO: 94.4 FL (ref 80–100)
PDW BLD-RTO: 16.1 % (ref 12.4–15.4)
PLATELET # BLD: 187 K/UL (ref 135–450)
PMV BLD AUTO: 7.2 FL (ref 5–10.5)
POTASSIUM SERPL-SCNC: 5 MMOL/L (ref 3.5–5.1)
RBC # BLD: 2.84 M/UL (ref 4–5.2)
SODIUM BLD-SCNC: 133 MMOL/L (ref 136–145)
WBC # BLD: 4.7 K/UL (ref 4–11)

## 2018-10-03 PROCEDURE — 6370000000 HC RX 637 (ALT 250 FOR IP): Performed by: PHYSICAL MEDICINE & REHABILITATION

## 2018-10-03 PROCEDURE — 85027 COMPLETE CBC AUTOMATED: CPT

## 2018-10-03 PROCEDURE — 6360000002 HC RX W HCPCS: Performed by: PHYSICAL MEDICINE & REHABILITATION

## 2018-10-03 PROCEDURE — 97110 THERAPEUTIC EXERCISES: CPT

## 2018-10-03 PROCEDURE — 97530 THERAPEUTIC ACTIVITIES: CPT

## 2018-10-03 PROCEDURE — 36591 DRAW BLOOD OFF VENOUS DEVICE: CPT

## 2018-10-03 PROCEDURE — 97116 GAIT TRAINING THERAPY: CPT

## 2018-10-03 PROCEDURE — 94760 N-INVAS EAR/PLS OXIMETRY 1: CPT

## 2018-10-03 PROCEDURE — 1280000000 HC REHAB R&B

## 2018-10-03 PROCEDURE — 80048 BASIC METABOLIC PNL TOTAL CA: CPT

## 2018-10-03 PROCEDURE — 2580000003 HC RX 258: Performed by: PHYSICAL MEDICINE & REHABILITATION

## 2018-10-03 PROCEDURE — 97535 SELF CARE MNGMENT TRAINING: CPT

## 2018-10-03 RX ADMIN — OXYCODONE HYDROCHLORIDE 40 MG: 10 TABLET ORAL at 16:54

## 2018-10-03 RX ADMIN — CALCIUM CARBONATE-VITAMIN D TAB 500 MG-200 UNIT 1 TABLET: 500-200 TAB at 08:11

## 2018-10-03 RX ADMIN — ASPIRIN 81 MG: 81 TABLET, COATED ORAL at 08:11

## 2018-10-03 RX ADMIN — Medication 400 MG: at 08:11

## 2018-10-03 RX ADMIN — OXYCODONE HYDROCHLORIDE 40 MG: 10 TABLET ORAL at 20:41

## 2018-10-03 RX ADMIN — POLYETHYLENE GLYCOL 3350 17 G: 17 POWDER, FOR SOLUTION ORAL at 08:12

## 2018-10-03 RX ADMIN — METHADONE HYDROCHLORIDE 10 MG: 10 TABLET ORAL at 08:11

## 2018-10-03 RX ADMIN — METOPROLOL SUCCINATE 50 MG: 50 TABLET, EXTENDED RELEASE ORAL at 08:11

## 2018-10-03 RX ADMIN — SENNOSIDES AND DOCUSATE SODIUM 1 TABLET: 8.6; 5 TABLET ORAL at 08:11

## 2018-10-03 RX ADMIN — ATORVASTATIN CALCIUM 20 MG: 20 TABLET, FILM COATED ORAL at 20:41

## 2018-10-03 RX ADMIN — OXYCODONE HYDROCHLORIDE 40 MG: 10 TABLET ORAL at 05:16

## 2018-10-03 RX ADMIN — NORTRIPTYLINE HYDROCHLORIDE 75 MG: 25 CAPSULE ORAL at 22:37

## 2018-10-03 RX ADMIN — HEPARIN SODIUM 5000 UNITS: 5000 INJECTION INTRAVENOUS; SUBCUTANEOUS at 20:41

## 2018-10-03 RX ADMIN — Medication 10 ML: at 08:12

## 2018-10-03 RX ADMIN — OXYCODONE HYDROCHLORIDE 40 MG: 10 TABLET ORAL at 12:39

## 2018-10-03 RX ADMIN — HEPARIN SODIUM 5000 UNITS: 5000 INJECTION INTRAVENOUS; SUBCUTANEOUS at 05:16

## 2018-10-03 RX ADMIN — MIRTAZAPINE 15 MG: 15 TABLET, FILM COATED ORAL at 20:41

## 2018-10-03 RX ADMIN — Medication 1 CAPSULE: at 08:11

## 2018-10-03 RX ADMIN — SENNOSIDES AND DOCUSATE SODIUM 1 TABLET: 8.6; 5 TABLET ORAL at 20:41

## 2018-10-03 RX ADMIN — CALCIUM CARBONATE-VITAMIN D TAB 500 MG-200 UNIT 1 TABLET: 500-200 TAB at 20:41

## 2018-10-03 RX ADMIN — HEPARIN SODIUM 5000 UNITS: 5000 INJECTION INTRAVENOUS; SUBCUTANEOUS at 12:39

## 2018-10-03 RX ADMIN — Medication 400 MG: at 20:41

## 2018-10-03 RX ADMIN — Medication 1 CAPSULE: at 16:05

## 2018-10-03 RX ADMIN — Medication 10 ML: at 20:41

## 2018-10-03 ASSESSMENT — PAIN DESCRIPTION - LOCATION
LOCATION: HIP;BUTTOCKS
LOCATION: BUTTOCKS;HIP
LOCATION: BUTTOCKS
LOCATION: GROIN;HIP
LOCATION: BUTTOCKS
LOCATION: HIP
LOCATION: BUTTOCKS;HIP

## 2018-10-03 ASSESSMENT — PAIN DESCRIPTION - ORIENTATION
ORIENTATION: LEFT

## 2018-10-03 ASSESSMENT — PAIN SCALES - GENERAL
PAINLEVEL_OUTOF10: 5
PAINLEVEL_OUTOF10: 3
PAINLEVEL_OUTOF10: 0
PAINLEVEL_OUTOF10: 8
PAINLEVEL_OUTOF10: 7
PAINLEVEL_OUTOF10: 0
PAINLEVEL_OUTOF10: 7
PAINLEVEL_OUTOF10: 3
PAINLEVEL_OUTOF10: 5
PAINLEVEL_OUTOF10: 6

## 2018-10-03 ASSESSMENT — PAIN DESCRIPTION - PAIN TYPE
TYPE: ACUTE PAIN;SURGICAL PAIN
TYPE: SURGICAL PAIN;CHRONIC PAIN
TYPE: CHRONIC PAIN
TYPE: CHRONIC PAIN;SURGICAL PAIN
TYPE: ACUTE PAIN;SURGICAL PAIN
TYPE: SURGICAL PAIN
TYPE: ACUTE PAIN;SURGICAL PAIN

## 2018-10-03 ASSESSMENT — PAIN DESCRIPTION - ONSET: ONSET: ON-GOING

## 2018-10-03 ASSESSMENT — PAIN DESCRIPTION - PROGRESSION: CLINICAL_PROGRESSION: NOT CHANGED

## 2018-10-03 ASSESSMENT — PAIN DESCRIPTION - FREQUENCY: FREQUENCY: INTERMITTENT

## 2018-10-03 ASSESSMENT — PAIN DESCRIPTION - DESCRIPTORS: DESCRIPTORS: ACHING

## 2018-10-03 NOTE — PROGRESS NOTES
unable to do so, required cueing in order to negotiate steps  non-reciprocally. Patient required cueing to ascend curb step with \"up with the good and down with the bad,\" as patient did not know which leg to use. Exercises  Heelslides: x15 bilaterally in supine with assistance. Gluteal Sets: x20   Hip Abduction: x15 bilaterally. In supine position  Knee Short Arc Quad: x15 bilaterally in supine position. Other exercises  Other exercises 1: Hip IR/ER in supine bilaterally x15 with assistance. Other exercises 2: Hip adduction sets with roll in between legs. x15 hold 5 seconds; with reproduction of groin pain. SECOND SESSION:  Subjective: Patient was able to state her precautions at the beginning of the session, except for external rotation. Objective:   Patient completed 12 6\" stairs in a nonreciprocal pattern without cueing to go up with the good leg and standby-contact guard assist. Patient ascended 6\" curb step twice without cueing for correct leg sequence with stand by assist. Then patient walked 180' feet with a reciprocal gait pattern and standby- contact guard assist with cues to stand up straight and decreased heel strike on the LLE. Patient was able to complete all of the above before a rest break was needed. Sit to Stand Transfer: Standby- contact guard assist; patient required cueing in order to scoot out and extend LLE to maintain hip precautions. Upon last sit to stand patient was not able to completely stand on the first attempt and required two attempts, likely due to fatigue. Exercises in sitting: Hip adductor hold 5 seconds x15. Long arc quat bIlat x15 reps. Glut sets x15. Ankle pumps x15 bilat. Hip abduction with green theraband x15.   180' ambulation with wheeled walker and stand by assist, patient required repeated verbal cueing in order to stay erect and not to let the walker get too far away from her.    Assessment Second Session:   Patient denied Physical Therapist's suggestion of home care due to dogs and not wanting anyone to come to the house. Patient will participate in outpatient therapy. Assessment   Body structures, Functions, Activity limitations: Decreased functional mobility ; Decreased ADL status; Decreased strength;Decreased safe awareness;Decreased endurance;Decreased sensation;Decreased balance;Decreased vision/visual deficit  Assessment: Per Dr. Nelia Sharp, Patient is a  female, with a past medical history significant for colorectal cancer with mets to the liver and lung, vesicovaginal fistula s/p bilateral percutaneous nephrostomy tubes and COPD, who presented to hospital after a fall one week earlier. The patient presented with L hip pain. Xrays revealed a L femoral fracture and was admitted for further treatment. Ortho was consulted and the patient underwent surgical repair with L hip hemiarthroplasty on 9/26/18. She was transfused 1 unit PRBC POD #1 due to hgb of 7.8 with hypotension. The patient was found to have UC + for Providencia rettgeri and Providencia stuartii. She was treated with Rocephin. She does have bilateral nephrostomy tubes and a hx of recurrent UTI. Abx are to be discontinued today after rocephin, as + Urine culture is suspected to be colonization. Patient was independent with no device prior to fall. Pt progressing well with functional mobility, requiring SBA for sit to stand transfers and ambulating 180' with RW this session. However, pt continues to require Esme for bed mobility. Pt ascended/descended 8 steps with bilat rails and SBA. Patient was able to remember sequencing for steps but did not know the correct sequencing for a curb step. She also required cueing for correct usage of hands and maintance of hip precautions during sit to stand transfer. Patient limited by pain, decreased strength, balance and cognition. Patient would benefit from inpatient rehab to improve overall mobility to be independent with AD.  Anticipate

## 2018-10-03 NOTE — PROGRESS NOTES
medication is keeping her left hip pain under fairly good control. Recheck of her labs today reveal improvement in her potassium level.       /61   Pulse 78   Temp 98.2 °F (36.8 °C) (Oral)   Resp 16   Ht 5' 5\" (1.651 m)   Wt 145 lb 15.1 oz (66.2 kg)   LMP 01/01/2005   SpO2 95%   BMI 24.29 kg/m²     Last 24 hour lab  Recent Results (from the past 24 hour(s))   Basic Metabolic Panel    Collection Time: 10/03/18  5:26 AM   Result Value Ref Range    Sodium 133 (L) 136 - 145 mmol/L    Potassium 5.0 3.5 - 5.1 mmol/L    Chloride 100 99 - 110 mmol/L    CO2 25 21 - 32 mmol/L    Anion Gap 8 3 - 16    Glucose 91 70 - 99 mg/dL    BUN 14 7 - 20 mg/dL    CREATININE 1.0 0.6 - 1.2 mg/dL    GFR Non- 56 (A) >60    GFR African American >60 >60    Calcium 8.5 8.3 - 10.6 mg/dL   CBC    Collection Time: 10/03/18  5:27 AM   Result Value Ref Range    WBC 4.7 4.0 - 11.0 K/uL    RBC 2.84 (L) 4.00 - 5.20 M/uL    Hemoglobin 9.0 (L) 12.0 - 16.0 g/dL    Hematocrit 26.8 (L) 36.0 - 48.0 %    MCV 94.4 80.0 - 100.0 fL    MCH 31.7 26.0 - 34.0 pg    MCHC 33.6 31.0 - 36.0 g/dL    RDW 16.1 (H) 12.4 - 15.4 %    Platelets 704 473 - 514 K/uL    MPV 7.2 5.0 - 10.5 fL       Therapy progress:  PT  Position Activity Restriction  Hip Precautions: No hip flexion > 90 degrees, No hip external rotation, No hip extension  Other position/activity restrictions: anteriolateral precautions     WBAT    left Hip; bilateral percutaneous nephrostomies, colostomy  Objective     Sit to Stand: Stand by assistance  Stand to sit: Stand by assistance  Bed to Chair: Stand by assistance  Device: Rolling Walker  Assistance: Stand by assistance  Distance: 160' with 2 turns; short distances in therapy gym  OT  PT Equipment Recommendations  Equipment Needed: No  Other: Patient reports has 2 wheeled walker  Toilet Transfers Comments: Planned to practice later this date - pt does report sitting on commode at times d/t sensation despite previous telling

## 2018-10-03 NOTE — PROGRESS NOTES
A&Ox4. Ambulated from chair to bed with SBA and front wheeled walker. Able to doff and don upper body clothes but needed assistance with pants and KEV hose. Also needed help lifting left leg into bed. Colostomy pouch intact, she states she changed appliance earlier today, RT and LT nephrostomy tubes  Intact with small amt urine in each pouch, denies the need to empty at this time. Resting in bed watching TV. She states pain is tolerable, denies need for pain med at this time.  Paul Matos RN

## 2018-10-04 PROCEDURE — 97530 THERAPEUTIC ACTIVITIES: CPT

## 2018-10-04 PROCEDURE — 2580000003 HC RX 258

## 2018-10-04 PROCEDURE — 1280000000 HC REHAB R&B

## 2018-10-04 PROCEDURE — 6370000000 HC RX 637 (ALT 250 FOR IP): Performed by: PHYSICAL MEDICINE & REHABILITATION

## 2018-10-04 PROCEDURE — 97110 THERAPEUTIC EXERCISES: CPT

## 2018-10-04 PROCEDURE — 97116 GAIT TRAINING THERAPY: CPT

## 2018-10-04 PROCEDURE — 97535 SELF CARE MNGMENT TRAINING: CPT

## 2018-10-04 PROCEDURE — 2580000003 HC RX 258: Performed by: PHYSICAL MEDICINE & REHABILITATION

## 2018-10-04 PROCEDURE — 6360000002 HC RX W HCPCS: Performed by: PHYSICAL MEDICINE & REHABILITATION

## 2018-10-04 RX ORDER — SODIUM CHLORIDE 0.9 % (FLUSH) 0.9 %
SYRINGE (ML) INJECTION
Status: COMPLETED
Start: 2018-10-04 | End: 2018-10-04

## 2018-10-04 RX ADMIN — METHADONE HYDROCHLORIDE 10 MG: 10 TABLET ORAL at 09:42

## 2018-10-04 RX ADMIN — HEPARIN SODIUM 5000 UNITS: 5000 INJECTION INTRAVENOUS; SUBCUTANEOUS at 21:32

## 2018-10-04 RX ADMIN — MIRTAZAPINE 15 MG: 15 TABLET, FILM COATED ORAL at 21:32

## 2018-10-04 RX ADMIN — Medication 10 ML: at 22:50

## 2018-10-04 RX ADMIN — OXYCODONE HYDROCHLORIDE 40 MG: 10 TABLET ORAL at 11:42

## 2018-10-04 RX ADMIN — CALCIUM CARBONATE-VITAMIN D TAB 500 MG-200 UNIT 1 TABLET: 500-200 TAB at 21:32

## 2018-10-04 RX ADMIN — OXYCODONE HYDROCHLORIDE 40 MG: 10 TABLET ORAL at 16:23

## 2018-10-04 RX ADMIN — Medication 400 MG: at 09:43

## 2018-10-04 RX ADMIN — Medication 400 MG: at 21:32

## 2018-10-04 RX ADMIN — CALCIUM CARBONATE-VITAMIN D TAB 500 MG-200 UNIT 1 TABLET: 500-200 TAB at 09:43

## 2018-10-04 RX ADMIN — ATORVASTATIN CALCIUM 20 MG: 20 TABLET, FILM COATED ORAL at 21:32

## 2018-10-04 RX ADMIN — HEPARIN SODIUM 5000 UNITS: 5000 INJECTION INTRAVENOUS; SUBCUTANEOUS at 05:47

## 2018-10-04 RX ADMIN — Medication 1 CAPSULE: at 09:43

## 2018-10-04 RX ADMIN — OXYCODONE HYDROCHLORIDE 40 MG: 10 TABLET ORAL at 02:14

## 2018-10-04 RX ADMIN — Medication 1 CAPSULE: at 16:26

## 2018-10-04 RX ADMIN — SODIUM CHLORIDE, PRESERVATIVE FREE: 5 INJECTION INTRAVENOUS at 22:50

## 2018-10-04 RX ADMIN — OXYCODONE HYDROCHLORIDE 40 MG: 10 TABLET ORAL at 21:32

## 2018-10-04 RX ADMIN — ASPIRIN 81 MG: 81 TABLET, COATED ORAL at 09:43

## 2018-10-04 RX ADMIN — HEPARIN SODIUM 5000 UNITS: 5000 INJECTION INTRAVENOUS; SUBCUTANEOUS at 13:41

## 2018-10-04 RX ADMIN — Medication 10 ML: at 09:38

## 2018-10-04 RX ADMIN — NORTRIPTYLINE HYDROCHLORIDE 75 MG: 25 CAPSULE ORAL at 21:32

## 2018-10-04 RX ADMIN — OXYCODONE HYDROCHLORIDE 40 MG: 10 TABLET ORAL at 06:03

## 2018-10-04 ASSESSMENT — PAIN DESCRIPTION - LOCATION
LOCATION: BUTTOCKS
LOCATION: BUTTOCKS;HIP
LOCATION: HIP
LOCATION: HIP
LOCATION: GROIN;HIP
LOCATION: HIP;BUTTOCKS
LOCATION: HIP

## 2018-10-04 ASSESSMENT — PAIN DESCRIPTION - PAIN TYPE
TYPE: SURGICAL PAIN
TYPE: ACUTE PAIN;SURGICAL PAIN
TYPE: CHRONIC PAIN;SURGICAL PAIN
TYPE: SURGICAL PAIN
TYPE: ACUTE PAIN
TYPE: SURGICAL PAIN
TYPE: SURGICAL PAIN
TYPE: ACUTE PAIN;SURGICAL PAIN;CHRONIC PAIN
TYPE: ACUTE PAIN;SURGICAL PAIN;CHRONIC PAIN

## 2018-10-04 ASSESSMENT — PAIN DESCRIPTION - FREQUENCY
FREQUENCY: INTERMITTENT
FREQUENCY: INTERMITTENT

## 2018-10-04 ASSESSMENT — PAIN DESCRIPTION - ORIENTATION
ORIENTATION: LEFT

## 2018-10-04 ASSESSMENT — PAIN DESCRIPTION - ONSET
ONSET: ON-GOING
ONSET: ON-GOING

## 2018-10-04 ASSESSMENT — PAIN DESCRIPTION - DESCRIPTORS
DESCRIPTORS: ACHING

## 2018-10-04 ASSESSMENT — PAIN SCALES - GENERAL
PAINLEVEL_OUTOF10: 4
PAINLEVEL_OUTOF10: 5
PAINLEVEL_OUTOF10: 3
PAINLEVEL_OUTOF10: 4
PAINLEVEL_OUTOF10: 6
PAINLEVEL_OUTOF10: 5
PAINLEVEL_OUTOF10: 4
PAINLEVEL_OUTOF10: 4
PAINLEVEL_OUTOF10: 3
PAINLEVEL_OUTOF10: 7
PAINLEVEL_OUTOF10: 4
PAINLEVEL_OUTOF10: 4
PAINLEVEL_OUTOF10: 6
PAINLEVEL_OUTOF10: 6
PAINLEVEL_OUTOF10: 5
PAINLEVEL_OUTOF10: 7
PAINLEVEL_OUTOF10: 7
PAINLEVEL_OUTOF10: 2

## 2018-10-04 ASSESSMENT — PAIN DESCRIPTION - PROGRESSION
CLINICAL_PROGRESSION: NOT CHANGED
CLINICAL_PROGRESSION: NOT CHANGED

## 2018-10-04 NOTE — CARE COORDINATION
[facility] received referral from  for 3-pc Hip Kit.   Item delivered to patient this AM.    Thank you for the referral.  Electronically signed by Kristie Darby on 10/4/2018 at 9:18 AM Cell ph# [phone]

## 2018-10-04 NOTE — PROGRESS NOTES
Department of [facility] Progress Note    Patient Identification:  Chele Land  [id number]  : 1955  Admit date: 2018      Diagnosis:   Patient Active Problem List   Diagnosis    Adjustment reaction with prolonged depressive reaction    Asthma    Radiation proctitis    Essential hypertension    Colostomy status ([address]    Cervical cancer ([address]    Proctodynia    Malignant neoplasm of sigmoid colon ([address]    Tobacco dependence    Asymptomatic gallstones    Metastasis to liver (HCC)    Insomnia    Urinary incontinence    Vaginal discharge    History of radiation therapy    Metastatic adenocarcinoma to lung (HCC)    Chemotherapy-induced neuropathy (HCC)    Constipation due to pain medication    Rectovaginal fistula    Vesico-vaginal fistula    Cancer associated pain    Nephrostomy status (HCC)    Chronic kidney disease, stage III (moderate) (HCC)    Mild protein-calorie malnutrition (HCC)    Anemia, iron deficiency    Right leg claudication ([address]    Right iliac artery stenosis (HCC)    History of hypertension resolved with weight loss    Hypokalemia    Amputated toe of right foot ([facility] 4th toe due to ischemia    Closed fracture of neck of left femur ([address]    Closed displaced fracture of left femoral neck (HCC)    Tachycardia    Hip fracture, left, closed, with routine healing, subsequent encounter           Subjective: Pt seen this AM. Patient was discussed yesterday in [address] with entire Rehab Team, and we think patient will be ready for discharge to back to home In 2 days, on Saturday with home therapies. Patient will have help at home at discharge. She is now tolerating going up and down 12 steps in PT. Patient Is feeling better this morning. Her pain medication is keeping her left hip pain under fairly good control. Will fill out her CALI and meds tomorrow for her discharge Saturday.       BP (!) 113/57   Pulse 86   Temp 98.1 °F (36.7 °C) (Oral)   Resp 19   Ht 5' 5\" (1.651 m)   Wt 146 lb 6.2 oz (66.4 kg)   LMP 01/01/2005   SpO2 96%   BMI 24.36 kg/m²     Last 24 hour lab  No results found for this or any previous visit (from the past 24 hour(s)). Therapy progress:  PT  Position Activity Restriction  Hip Precautions: No hip flexion > 90 degrees, No hip external rotation, No hip extension  Other position/activity restrictions: anteriolateral precautions     WBAT    left Hip; bilateral percutaneous nephrostomies, colostomy  Objective     Sit to Stand: Stand by assistance, Supervision  Stand to sit: Supervision, Stand by assistance  Bed to Chair: Stand by assistance  Device: [email or web address]  Assistance: Stand by assistance, Supervision  Distance: 400' with two turns. OT  PT Equipment Recommendations  Equipment Needed: No  Other: Patient reports has 2 wheeled walker and cane at home. Toilet Transfers Comments: Planned to practice later this date - pt does report sitting on commode at times d/t sensation despite previous telling therapist she does not use. Plan: Continue Rehab Unit treatment. Discharge to home on Saturday with home therapies of PT, OT, and visiting nurse.       Justus Schirmer, MD, 10/4/2018, 9:33 AM

## 2018-10-04 NOTE — PROGRESS NOTES
Removed dressing from left hip. Surgical glue and steri strips with dried blood are seen. No issues.

## 2018-10-04 NOTE — PROGRESS NOTES
external rotation, No hip extension  Other position/activity restrictions: anteriolateral precautions     WBAT    left Hip; bilateral percutaneous nephrostomies, colostomy  Subjective      Pain Assessment  Pain Assessment: 0-10  Pain Level: 4  Pain Type: Surgical pain       Orientation     Objective   Bed mobility  Supine to Sit: Supervision  Sit to Supine: Stand by assistance;Contact guard assistance  Comment: Patient was able to independently bring her LLE into the bed on this date. Transfers  Sit to Stand: Stand by assistance;Supervision  Stand to sit: Supervision;Stand by assistance  Ambulation  Ambulation?: Yes  WB Status: WBAT LLE  More Ambulation?: No  Ambulation 1  Surface: level tile  Device: Rolling Walker  Assistance: Stand by assistance;Supervision  Quality of Gait: Fair heel contact and weight bearing. Patient displayed a more upright position. Distance: 0' with two turns. Comments: Patient ambulated over the transition from tile to carpet and then over the carpet. Stairs/Curb  Stairs?: Yes  Stairs  # Steps : 12  Stairs Height: 6\"  Rails: Left ascending  Curbs: 6\" (one curb step)  Device: No Device;Rolling walker (no device steps, RW curb step)  Assistance: Contact guard assistance;Stand by assistance (CGA curb step; close SBA steps)  Comment: Patient was able to ascend/descend stairs with only L rail on this date, which she was not able to do before. At home patient has a rail only on the L side. Patient displayed L lateral lean when ascending stairs. Patient climbed a 6\" curb step twice with minimal cueing, except to bring her feet closer to the curb step. Exercises  Heelslides: x15 bilaterally in supine with orange tube. Pt required assistance in order to avoid ER on the LLE. Gluteal Sets: x15 in supine   Hip Abduction: x15 bilaterally. In supine position  Knee Short Arc Quad: x15 bilaterally in supine position.           Comment: Looked up and provided a printed copy of outpatient physical therapy services near the patient's home. Second Session  Subjective:   Patient stated that she was stiff this afternoon and reported a pain score of 3-4/10 in her hip and groin. Patient stated that she was concerned about getting in and out of bed at home. Objective:   Patient completed a sit to stand transfer with supervision and without cueing. She then performed a sit to supine transfer. She was able to complete this with supervision but had to use her hands to lift her LLE this session. Exercises in Supine: x20 bilat heel slides with assistance to avoid ER, x20 bilat short arc quad, x20 bilat hip abduction, x20 bilat ankle pumps, x20 glut sets. Patient completed supine to sit transfer with supervision with assistance to move sheets out from underneath her foot to help decrease friction. Patient completed sit to stand transfer with minimal assistance. Patient walked 80' with RW and supervision. Patient required minimal cueing to keep the walker close to her, especially during transitioning from tile to carpet. Patient traversed four floor transitions and over carpeted yoli. Patient exhibited fair heel contact on LLE. Patient completed stand to sit transfer with supervision. Exercises in Sitting: x20 bilat long arc quads, x20 bilat hip abduction with green theraband, x20 bilat knee flexion with green theraband,  x20 hip adduction with green roll. Patient was left with all safety precautions in place waiting for transport back to room. Assessment   Body structures, Functions, Activity limitations: Decreased functional mobility ; Decreased ADL status; Decreased strength;Decreased safe awareness;Decreased endurance;Decreased sensation;Decreased balance;Decreased vision/visual deficit  Assessment: Pt progressing well with functional mobility, requiring supervision- SBA for sit to stand transfers and ambulating 400' with RW this session.  Patient required supervison-SBA

## 2018-10-05 PROCEDURE — 97530 THERAPEUTIC ACTIVITIES: CPT

## 2018-10-05 PROCEDURE — 97110 THERAPEUTIC EXERCISES: CPT

## 2018-10-05 PROCEDURE — 97116 GAIT TRAINING THERAPY: CPT

## 2018-10-05 PROCEDURE — 2580000003 HC RX 258: Performed by: PHYSICAL MEDICINE & REHABILITATION

## 2018-10-05 PROCEDURE — 1280000000 HC REHAB R&B

## 2018-10-05 PROCEDURE — 6370000000 HC RX 637 (ALT 250 FOR IP): Performed by: PHYSICAL MEDICINE & REHABILITATION

## 2018-10-05 PROCEDURE — 6360000002 HC RX W HCPCS: Performed by: PHYSICAL MEDICINE & REHABILITATION

## 2018-10-05 PROCEDURE — 97535 SELF CARE MNGMENT TRAINING: CPT

## 2018-10-05 RX ORDER — FENTANYL 100 UG/H
1 PATCH TRANSDERMAL
Qty: 10 PATCH | Refills: 0 | Status: SHIPPED | OUTPATIENT
Start: 2018-10-06 | End: 2018-11-05

## 2018-10-05 RX ORDER — OXYCODONE HYDROCHLORIDE 20 MG/1
40 TABLET ORAL EVERY 4 HOURS PRN
Qty: 50 TABLET | Refills: 0 | Status: SHIPPED | OUTPATIENT
Start: 2018-10-05 | End: 2018-10-12

## 2018-10-05 RX ORDER — B-COMPLEX WITH VITAMIN C
1 TABLET ORAL 2 TIMES DAILY
Qty: 60 TABLET | Refills: 0 | Status: SHIPPED | OUTPATIENT
Start: 2018-10-05 | End: 2018-12-12 | Stop reason: ALTCHOICE

## 2018-10-05 RX ORDER — METHADONE HYDROCHLORIDE 10 MG/1
10 TABLET ORAL DAILY
Qty: 30 TABLET | Refills: 0 | Status: SHIPPED | OUTPATIENT
Start: 2018-10-06 | End: 2018-10-16

## 2018-10-05 RX ADMIN — HEPARIN SODIUM 5000 UNITS: 5000 INJECTION INTRAVENOUS; SUBCUTANEOUS at 21:05

## 2018-10-05 RX ADMIN — OXYCODONE HYDROCHLORIDE 40 MG: 10 TABLET ORAL at 01:52

## 2018-10-05 RX ADMIN — HEPARIN SODIUM 5000 UNITS: 5000 INJECTION INTRAVENOUS; SUBCUTANEOUS at 14:50

## 2018-10-05 RX ADMIN — METOPROLOL SUCCINATE 50 MG: 50 TABLET, EXTENDED RELEASE ORAL at 08:05

## 2018-10-05 RX ADMIN — Medication 10 ML: at 20:54

## 2018-10-05 RX ADMIN — HEPARIN SODIUM 5000 UNITS: 5000 INJECTION INTRAVENOUS; SUBCUTANEOUS at 05:53

## 2018-10-05 RX ADMIN — Medication 400 MG: at 20:51

## 2018-10-05 RX ADMIN — ATORVASTATIN CALCIUM 20 MG: 20 TABLET, FILM COATED ORAL at 20:51

## 2018-10-05 RX ADMIN — OXYCODONE HYDROCHLORIDE 40 MG: 10 TABLET ORAL at 21:09

## 2018-10-05 RX ADMIN — SENNOSIDES AND DOCUSATE SODIUM 1 TABLET: 8.6; 5 TABLET ORAL at 20:51

## 2018-10-05 RX ADMIN — CALCIUM CARBONATE-VITAMIN D TAB 500 MG-200 UNIT 1 TABLET: 500-200 TAB at 08:05

## 2018-10-05 RX ADMIN — NORTRIPTYLINE HYDROCHLORIDE 75 MG: 25 CAPSULE ORAL at 20:51

## 2018-10-05 RX ADMIN — MIRTAZAPINE 15 MG: 15 TABLET, FILM COATED ORAL at 20:51

## 2018-10-05 RX ADMIN — Medication 1 CAPSULE: at 17:13

## 2018-10-05 RX ADMIN — OXYCODONE HYDROCHLORIDE 40 MG: 10 TABLET ORAL at 14:50

## 2018-10-05 RX ADMIN — Medication 400 MG: at 08:05

## 2018-10-05 RX ADMIN — ASPIRIN 81 MG: 81 TABLET, COATED ORAL at 08:05

## 2018-10-05 RX ADMIN — Medication 1 CAPSULE: at 08:05

## 2018-10-05 RX ADMIN — METHADONE HYDROCHLORIDE 10 MG: 10 TABLET ORAL at 08:04

## 2018-10-05 RX ADMIN — Medication 10 ML: at 08:13

## 2018-10-05 RX ADMIN — OXYCODONE HYDROCHLORIDE 40 MG: 10 TABLET ORAL at 08:05

## 2018-10-05 RX ADMIN — CALCIUM CARBONATE-VITAMIN D TAB 500 MG-200 UNIT 1 TABLET: 500-200 TAB at 20:51

## 2018-10-05 ASSESSMENT — PAIN DESCRIPTION - ORIENTATION
ORIENTATION: LEFT

## 2018-10-05 ASSESSMENT — PAIN SCALES - GENERAL
PAINLEVEL_OUTOF10: 0
PAINLEVEL_OUTOF10: 7
PAINLEVEL_OUTOF10: 4
PAINLEVEL_OUTOF10: 6
PAINLEVEL_OUTOF10: 3
PAINLEVEL_OUTOF10: 7
PAINLEVEL_OUTOF10: 4
PAINLEVEL_OUTOF10: 7

## 2018-10-05 ASSESSMENT — PAIN DESCRIPTION - PAIN TYPE
TYPE: SURGICAL PAIN

## 2018-10-05 ASSESSMENT — PAIN DESCRIPTION - ONSET
ONSET: ON-GOING
ONSET: ON-GOING

## 2018-10-05 ASSESSMENT — PAIN DESCRIPTION - FREQUENCY
FREQUENCY: INTERMITTENT
FREQUENCY: INTERMITTENT

## 2018-10-05 ASSESSMENT — PAIN DESCRIPTION - PROGRESSION
CLINICAL_PROGRESSION: NOT CHANGED

## 2018-10-05 ASSESSMENT — PAIN DESCRIPTION - LOCATION
LOCATION: HIP
LOCATION: HIP
LOCATION: HIP;GROIN
LOCATION: HIP

## 2018-10-05 ASSESSMENT — PAIN DESCRIPTION - DESCRIPTORS
DESCRIPTORS: ACHING
DESCRIPTORS: ACHING

## 2018-10-05 NOTE — DISCHARGE SUMMARY
Occupational Therapy  Discharge Summary     Name:Sonya Green  IXJ:[id number]  :1955  Treatment Diagnosis: decreased functional mobility       Restrictions/Precautions  Restrictions/Precautions: Fall Risk, Weight Bearing   Lower Extremity Weight Bearing Restrictions  Left Lower Extremity Weight Bearing: Weight Bearing As Tolerated       Position Activity Restriction  Hip Precautions: No hip flexion > 90 degrees, No hip external rotation, No hip extension  Other position/activity restrictions: anteriolateral precautions     WBAT    left Hip; bilateral percutaneous nephrostomies, colostomy     Goals:   Short term goals  Time Frame for Short term goals: 5 days (from 18)   Short term goal 1: Pt will complete fxl mobility and fxl transfers to/from ADL surfaces with SBA and use of AD  Goal Met  Short term goal 2: Pt will complete toileting with SBA - pt completes seated with setup MET   Short term goal 3: Pt will complete LB bathing/dressing with Min A and use of AE prn MET   Short term goal 4: Pt will complete UB bathing/dressing tasks with SBA MET  Short term goal 5: Pt will tolerate 3-5 minutes of standing functional activity with SBA to increase activity tolerance for self-care and functional mobility   Goal Met   Long term goals  Time Frame for Long term goals : 7-10 days (from 18)   Long term goal 1: Pt will complete fxl mobility and fxl transfers to/from ADL surfaces with mod I.  Goal Met  Long term goal 2: Pt will complete toileting mod I   Goal Met - manages emptying urostomy and colostomy bags with nursing per patient report  Long term goal 3: Pt will complete LB bathing/dressing mod I  with use of AE prn  Goal Met  Long term goal 4: Pt will complete UB bathing/dressing tasks mod I   Goal Met  Long term goal 5: Pt will complete grooming mod I  Goal Met  Long term goals 6: Pt will complete light meal prep/IADL tasks with mod I Goal not met - supervision for safety  Long term goal 7: Pt will

## 2018-10-05 NOTE — PROGRESS NOTES
sit: Modified independent  Comment: Patient was able to maintain hip precautions and correctly sequence hands placement. Ambulation  Ambulation?: Yes  WB Status: WBAT LLE  More Ambulation?: No  Ambulation 1  Surface: level tile  Device: Rolling Walker  Assistance: Modified Independent  Quality of Gait: Fair heel contact and weight bearing. Patient displayed a more upright position. Distance: 80' with two turns. Comments: Patient ambulated over the transition from tile to carpet and then over the carpet. Stairs/Curb  Stairs?: Yes  Stairs  # Steps : 12  Stairs Height: 6\"  Rails: Left ascending  Curbs: 6\" (one curb step)  Device: No Device;Rolling walker (no device steps, RW curb step)  Assistance: Stand by assistance (CGA curb step; close SBA steps)  Comment: Patient was able to ascend/descend stairs with only L rail on this date. Patient displayed L lateral lean when ascending stairs. Patient climbed a 6\" curb step. Exercises  Heelslides: x20 bilaterally in supine with wedge and two pillows and orange tube. Pt required assistance in order to avoid ER on the LLE. Gluteal Sets: x20 in supine with wedge and two pillows. Hip Abduction: x20 bilaterally. In supine position with wedge and two pillows and orange tube. Knee Short Arc Quad: x20 bilaterally in supine position with wedge and two pillows   Ankle Pumps: x20 bilat in supine with wedge and two pillows. SECOND SESSION:   Subjective:   Patient stated that she was feeling stiff and had not taken her pain medication since the morning dose. Stated her pain was 5-6/10 in the hip and groin. Patient noted that she would like to practice a car transfer. Objective:   Patient was able to  an object from the floor using reacher while maintaining her hip precautions. Patient transferred from sit to supine with Mod I using a belt to lift her LLE.  Patient was educated on how to roll to her unaffected side while maintaining her hip precautions using activity as tolerated  Prognosis: Good  Patient Education: Educated patient on HEP, continuing to ice groin area, and benefits of outpatient PT.   REQUIRES PT FOLLOW UP: Yes  Activity Tolerance  Activity Tolerance: Patient Tolerated treatment well;Patient limited by pain  Activity Tolerance: Patient noted groin pain during exercsies in supine. Goals  Short term goals  Time Frame for Short term goals: 7-10 days  Short term goal 1: bed mobility MI -MET 10/5/18  Short term goal 2: transfers at MI-MET 10/5/18  Short term goal 3: ambulation with wheeled walker with ' -MET 10/5/18  Short term goal 4: ascend /descend curb step with wheeled walker with SBA - MET 10/5/18  Short term goal 5: ascend/descend 4 steps with L rail and AD with SBA -MET 10/4/18  Long term goals  Time Frame for Long term goals : STG=LTG  Patient Goals   Patient goals : To go home and be able to take care of self    Plan    Plan  Times per week: 5-6x  Times per day: Twice a day  Plan weeks: 7-10 days  Specific instructions for Next Treatment: increase activity as tolerated  Current Treatment Recommendations: Strengthening, ROM, Balance Training, Functional Mobility Training, Transfer Training, ADL/Self-care Training, Endurance Training, Gait Training, Stair training, Pain Management, Home Exercise Program, Safety Education & Training, Patient/Caregiver Education & Training, Equipment Evaluation, Education, & procurement, Positioning  Safety Devices  Type of devices:  All fall risk precautions in place, Gait belt (Left patient awaiting transport. )  Restraints  Initially in place: No     Therapy Time   Individual Concurrent Group Co-treatment   Time In 1115         Time Out 1200         Minutes 45         Timed Code Treatment Minutes: 39 Minutes     Second Session Therapy Time     Individual Co-treatment   Time In 1300     Time Out 1345     Minutes 39           Therapist was present, directed the patient's care, made skilled judgement, and was responsible for assessment and treatment of the patient.         Jasperlanie Guerrero, SPT

## 2018-10-05 NOTE — PROGRESS NOTES
items from chair, carried over walker without cues  Shower Transfers  Shower Transfers: Not tested  Shower Transfers Comments: pt declines shower due to multiple tubes - \"it's a hassle\"  Wheelchair Bed Transfers  Wheelchair/Bed - Technique: Ambulating  Equipment Used: Bed  Level of Asssistance: Modified independent                              Cognition  Overall Cognitive Status: Exceptions  Memory: Decreased short term memory               PM session  Pt met in dept, stated she wanted to purchase the leg  in addition to the 3 pc hip kit which she purchased earlier this week. Pt gave check to OT to be given to [facility] [address] - simple meal prep    Pt gathered items to prepare grilled cheese using walker basket to carry. Stated she would need to use basket in her kitchen due to table being across the room. She requires occassional cues for walker safety and did not turn off stove when grilled cheese was complete. Recommended pt use a timer at home especially if she is leaving the room - could carry small timer with her as a reminder. Pt stated she gets sleepy with her meds and sometimes lies down while eggs are boiling -burning the water out of the pan. This was a problem PTA, is not a new problem         Transfer to bed with rolling walker indep. Bed mobility indep without leg , although she did purchase one in case she needs it      Pt to room per transport at end of session      see discharge summary  Assessment   Performance deficits / Impairments: Decreased ADL status  Assessment: Pt is indep with bathing at sink using long sponge for LE, dresses UB indep LB with min assist for left KEV hose using sock aid, otherwise indep. Transfers and functional mobility are indep with rolling walker. Pt is safe for room indep, discussed with nursing and PT. Pt will be discharged tomorrow, Sat 10/6/18 with recommended home OT, but pt declines.   Do not feel out pt OT will be beneficial as she

## 2018-10-05 NOTE — PROGRESS NOTES
(36.6 °C) (Oral)   Resp 16   Ht 5' 5\" (1.651 m)   Wt 143 lb 1.3 oz (64.9 kg)   LMP 01/01/2005   SpO2 99%   BMI 23.81 kg/m²     Last 24 hour lab  No results found for this or any previous visit (from the past 24 hour(s)). Therapy progress:  PT  Position Activity Restriction  Hip Precautions: No hip flexion > 90 degrees, No hip external rotation, No hip extension  Other position/activity restrictions: anteriolateral precautions     WBAT    left Hip; bilateral percutaneous nephrostomies, colostomy  Objective     Sit to Stand: Stand by assistance, Supervision  Stand to sit: Supervision, Stand by assistance  Bed to Chair: Stand by assistance  Device: [employer]  Assistance: Stand by assistance, Supervision  Distance: 400' with two turns. OT  PT Equipment Recommendations  Equipment Needed: No  Other: Patient reports has 2 wheeled walker and cane at home. Toilet Transfers Comments: Planned to practice later this date - pt does report sitting on commode at times d/t sensation despite previous telling therapist she does not use.                      Plan: Discharge to home  tomorrow with continued therapies after discharge with outpatient Milton King MD, 10/5/2018, 8:34 AM

## 2018-10-06 VITALS
DIASTOLIC BLOOD PRESSURE: 73 MMHG | HEART RATE: 104 BPM | SYSTOLIC BLOOD PRESSURE: 116 MMHG | TEMPERATURE: 98 F | HEIGHT: 65 IN | RESPIRATION RATE: 17 BRPM | WEIGHT: 143.96 LBS | BODY MASS INDEX: 23.99 KG/M2 | OXYGEN SATURATION: 97 %

## 2018-10-06 PROCEDURE — 2580000003 HC RX 258: Performed by: PHYSICAL MEDICINE & REHABILITATION

## 2018-10-06 PROCEDURE — 6370000000 HC RX 637 (ALT 250 FOR IP): Performed by: PHYSICAL MEDICINE & REHABILITATION

## 2018-10-06 PROCEDURE — 94760 N-INVAS EAR/PLS OXIMETRY 1: CPT

## 2018-10-06 PROCEDURE — 6360000002 HC RX W HCPCS: Performed by: PHYSICAL MEDICINE & REHABILITATION

## 2018-10-06 RX ADMIN — METHADONE HYDROCHLORIDE 10 MG: 10 TABLET ORAL at 08:10

## 2018-10-06 RX ADMIN — METOPROLOL SUCCINATE 50 MG: 50 TABLET, EXTENDED RELEASE ORAL at 08:11

## 2018-10-06 RX ADMIN — Medication 1 CAPSULE: at 08:10

## 2018-10-06 RX ADMIN — Medication 10 ML: at 08:11

## 2018-10-06 RX ADMIN — HEPARIN SODIUM 5000 UNITS: 5000 INJECTION INTRAVENOUS; SUBCUTANEOUS at 06:04

## 2018-10-06 RX ADMIN — Medication 400 MG: at 08:11

## 2018-10-06 RX ADMIN — ASPIRIN 81 MG: 81 TABLET, COATED ORAL at 08:10

## 2018-10-06 RX ADMIN — OXYCODONE HYDROCHLORIDE 40 MG: 10 TABLET ORAL at 08:10

## 2018-10-06 RX ADMIN — OXYCODONE HYDROCHLORIDE 40 MG: 10 TABLET ORAL at 02:14

## 2018-10-06 RX ADMIN — CALCIUM CARBONATE-VITAMIN D TAB 500 MG-200 UNIT 1 TABLET: 500-200 TAB at 08:10

## 2018-10-06 ASSESSMENT — PAIN DESCRIPTION - PROGRESSION
CLINICAL_PROGRESSION: NOT CHANGED

## 2018-10-06 ASSESSMENT — PAIN DESCRIPTION - PAIN TYPE: TYPE: SURGICAL PAIN

## 2018-10-06 ASSESSMENT — PAIN SCALES - GENERAL
PAINLEVEL_OUTOF10: 6
PAINLEVEL_OUTOF10: 4
PAINLEVEL_OUTOF10: 6
PAINLEVEL_OUTOF10: 6
PAINLEVEL_OUTOF10: 3

## 2018-10-06 ASSESSMENT — PAIN DESCRIPTION - ORIENTATION: ORIENTATION: LEFT

## 2018-10-06 ASSESSMENT — PAIN DESCRIPTION - FREQUENCY: FREQUENCY: CONTINUOUS

## 2018-10-06 ASSESSMENT — PAIN DESCRIPTION - ONSET: ONSET: ON-GOING

## 2018-10-06 ASSESSMENT — PAIN DESCRIPTION - DESCRIPTORS: DESCRIPTORS: ACHING;DISCOMFORT

## 2018-10-06 ASSESSMENT — PAIN DESCRIPTION - LOCATION: LOCATION: HIP

## 2018-10-06 NOTE — DISCHARGE SUMMARY
Department of [facility] Discharge Summary     Patient Identification:  Soo Avila  : 1955  Admit date: 2018  Discharge date: 10/06/2018   Attending provider: Mary Rich MD        Primary care provider: Slime Biggs MD     Discharge Diagnoses:   Patient Active Problem List   Diagnosis    Adjustment reaction with prolonged depressive reaction    Asthma    Radiation proctitis    Essential hypertension    Colostomy status ([address]    Cervical cancer ([address]    Proctodynia    Malignant neoplasm of sigmoid colon ([address]    Tobacco dependence    Asymptomatic gallstones    Metastasis to liver ([address]    Insomnia    Urinary incontinence    Vaginal discharge    History of radiation therapy    Metastatic adenocarcinoma to lung ([address]    Chemotherapy-induced neuropathy ([address]    Constipation due to pain medication    Rectovaginal fistula    Vesico-vaginal fistula    Cancer associated pain    Nephrostomy status (HCC)    Chronic kidney disease, stage III (moderate) (HCC)    Mild protein-calorie malnutrition (HCC)    Anemia, iron deficiency    Right leg claudication ([address]    Right iliac artery stenosis (HCC)    History of hypertension resolved with weight loss    Hypokalemia    Amputated toe of right foot (HCC) 4th toe due to ischemia    Closed fracture of neck of left femur (HCC)    Closed displaced fracture of left femoral neck (HCC)    Tachycardia    Hip fracture, left, closed, with routine healing, subsequent encounter         Discharge Functional Status:    Physical therapy:  Bed Mobility: Scooting: Modified independent  Transfers: Sit to Stand: Modified independent  Stand to sit: Modified independent  Bed to Chair: Stand by assistance  Comment: Looked up and provided a printed copy of outpatient physical therapy services near the patient's home. , WB Status: WBAT LLE  Ambulation 1  Surface: level tile  Device: Rolling Walker  Assistance: Providencia rettgeri and Providencia stuartii. She was treated with Rocephin. She does have bilateral nephrostomy tubes and a hx of recurrent UTI. Abx are to be discontinued today after rocephin dose,  as + Urine culture is suspected to be colonization. Patient started in therapies, and tolerated them, and she has been cleared by orthopedics for admission to our rehabilitation unit today. Patient lives at home with her daughter, the daughter works. Patient is agreeable to rehabilitation unit treatment at this time. After admission to the Rehab Unit, she made steady progress in her therapies as listed above under each therapy section. Her upper and lower extremity coordination and strength did improve in therapy, and she improved with her endurance and functional status as she participated in her rehab therapies. Her left hip pain and blood pressure were monitored while on the rehabilitation unit. Patient Is feeling better this morning, and thinks she will be ready for discharge tomorrow to home. She is now thinking she would rather do outpatient therapies instead of home therapy. Will give her a script for outpatient PT at D/C. Patient will have help at home at discharge. She is now tolerating going up and down 12 steps in PT. Her pain medication is keeping her left hip pain under fairly good control. Will fill out her CALI and meds today for her discharge Saturday. She will follow up with Dr. Victor Hugo Gardiner in 3-4 weeks.        Condition at Discharge: Good    Significant Diagnostics:   Lab Results   Component Value Date     (L) 10/03/2018    K 5.0 10/03/2018     10/03/2018    BUN 14 10/03/2018    CREATININE 1.0 10/03/2018    GLUCOSE 91 10/03/2018    CALCIUM 8.5 10/03/2018     Lab Results   Component Value Date    WBC 4.7 10/03/2018    RBC 2.84 (L) 10/03/2018    HGB 9.0 (L) 10/03/2018    HCT 26.8 (L) 10/03/2018    MCV 94.4 10/03/2018    MCH 31.7 10/03/2018    MCHC 33.6 10/03/2018    RDW 16.1 (H) 10/03/2018 ZOFRAN  Take 1 tablet by mouth every 8 hours as needed for Nausea or Vomiting        STOP taking these medications    nicotine 14 MG/24HR  Commonly known as:  Js Miser  Replaced by:  nicotine 7 MG/24HR     tetracycline 250 MG capsule  Commonly known as:  ACHROMYCIN;SUMYCIN           Where to Get Your Medications      You can get these medications from any pharmacy    Bring a paper prescription for each of these medications  · apixaban 2.5 MG Tabs tablet  · fentaNYL 100 MCG/HR  · magnesium oxide 400 (241.3 Mg) MG Tabs tablet  · methadone 10 MG tablet  · nicotine 7 MG/24HR  · oxyCODONE 20 MG immediate release tablet  · Oyster Shell Calcium/D 500-200 MG-UNIT Tabs       I spent over 35 minutes on this discharge encounter between counseling, coordination of care, and medication reconciliation.         To comply with [facility] bylaw R.II.4.1:   Discharge order placed in advance to facilitate patients discharge needs         Dl Lundberg MD, 10/6/2018, 8:28 AM

## 2018-10-07 ENCOUNTER — CARE COORDINATION (OUTPATIENT)
Dept: CASE MANAGEMENT | Age: 63
End: 2018-10-07

## 2018-10-08 NOTE — CARE COORDINATION
[facility] Transitions Initial Follow Up Call    Call within 2 business days of discharge: Yes    Patient: Grant Nunes Patient : 1955   MRN: [id number]  Reason for Admission: There are no discharge diagnoses documented for the most recent discharge. Discharge Date: 10/6/18 RARS: Readmission Risk Score: 26     Spoke with: no one    Facility: [facility] Transitions 24 Hour Call    Do you have all of your prescriptions and are they filled?:  Yes  Care Transitions Interventions         Follow Up: 2nd and final attempt at CTC d/c phone call. Unable to reach patient. Left message. Informed Selvin Chick this would be the final CTC call. Encouraged her to call her PCP with any future issues or concerns.   Future Appointments  Date Time Provider Milena John   2018 10:50 AM MD EROS Lau  ROMINA Yun, RN

## 2018-10-10 ENCOUNTER — OFFICE VISIT (OUTPATIENT)
Dept: ORTHOPEDIC SURGERY | Age: 63
End: 2018-10-10

## 2018-10-10 VITALS
HEIGHT: 65 IN | WEIGHT: 140 LBS | SYSTOLIC BLOOD PRESSURE: 113 MMHG | DIASTOLIC BLOOD PRESSURE: 61 MMHG | HEART RATE: 112 BPM | BODY MASS INDEX: 23.32 KG/M2

## 2018-10-10 DIAGNOSIS — S72.002D HIP FRACTURE, LEFT, CLOSED, WITH ROUTINE HEALING, SUBSEQUENT ENCOUNTER: Primary | ICD-10-CM

## 2018-10-10 PROCEDURE — 99024 POSTOP FOLLOW-UP VISIT: CPT | Performed by: NURSE PRACTITIONER

## 2018-10-10 PROCEDURE — APPNB15 APP NON BILLABLE TIME 0-15 MINS: Performed by: NURSE PRACTITIONER

## 2018-10-16 DIAGNOSIS — R60.0 PEDAL EDEMA: ICD-10-CM

## 2018-10-16 DIAGNOSIS — N18.30 CHRONIC KIDNEY DISEASE, STAGE III (MODERATE) (HCC): ICD-10-CM

## 2018-10-16 RX ORDER — FUROSEMIDE 20 MG/1
TABLET ORAL
Qty: 180 TABLET | Refills: 0 | Status: SHIPPED | OUTPATIENT
Start: 2018-10-16 | End: 2018-11-05 | Stop reason: SDUPTHER

## 2018-10-24 ENCOUNTER — HOSPITAL ENCOUNTER (OUTPATIENT)
Dept: INTERVENTIONAL RADIOLOGY/VASCULAR | Age: 63
Discharge: HOME | End: 2018-10-24
Payer: MEDICARE

## 2018-10-24 VITALS
HEIGHT: 65 IN | SYSTOLIC BLOOD PRESSURE: 101 MMHG | WEIGHT: 150.68 LBS | BODY MASS INDEX: 25.11 KG/M2 | OXYGEN SATURATION: 97 % | TEMPERATURE: 97.5 F | DIASTOLIC BLOOD PRESSURE: 59 MMHG | HEART RATE: 76 BPM | RESPIRATION RATE: 17 BRPM

## 2018-10-24 DIAGNOSIS — N20.0 RENAL STONES: ICD-10-CM

## 2018-10-24 LAB
INR BLD: 1.73 (ref 0.86–1.14)
PROTHROMBIN TIME: 19.7 SEC (ref 9.8–13)

## 2018-10-24 PROCEDURE — 50435 EXCHANGE NEPHROSTOMY CATH: CPT

## 2018-10-24 PROCEDURE — 36415 COLL VENOUS BLD VENIPUNCTURE: CPT

## 2018-10-24 PROCEDURE — 99152 MOD SED SAME PHYS/QHP 5/>YRS: CPT

## 2018-10-24 PROCEDURE — 6360000002 HC RX W HCPCS: Performed by: RADIOLOGY

## 2018-10-24 PROCEDURE — 2709999900 IR GUIDED NEPHROSTOMY CATH EXCHANGE

## 2018-10-24 PROCEDURE — 7100000011 HC PHASE II RECOVERY - ADDTL 15 MIN

## 2018-10-24 PROCEDURE — 85610 PROTHROMBIN TIME: CPT

## 2018-10-24 PROCEDURE — 7100000010 HC PHASE II RECOVERY - FIRST 15 MIN

## 2018-10-24 RX ORDER — CIPROFLOXACIN 2 MG/ML
400 INJECTION, SOLUTION INTRAVENOUS
Status: COMPLETED | OUTPATIENT
Start: 2018-10-24 | End: 2018-10-24

## 2018-10-24 RX ORDER — CLINDAMYCIN PHOSPHATE 900 MG/50ML
900 INJECTION INTRAVENOUS
Status: DISCONTINUED | OUTPATIENT
Start: 2018-10-24 | End: 2018-10-24

## 2018-10-24 RX ORDER — FENTANYL CITRATE 50 UG/ML
INJECTION, SOLUTION INTRAMUSCULAR; INTRAVENOUS DAILY PRN
Status: COMPLETED | OUTPATIENT
Start: 2018-10-24 | End: 2018-10-24

## 2018-10-24 RX ORDER — MIDAZOLAM HYDROCHLORIDE 1 MG/ML
INJECTION INTRAMUSCULAR; INTRAVENOUS DAILY PRN
Status: COMPLETED | OUTPATIENT
Start: 2018-10-24 | End: 2018-10-24

## 2018-10-24 RX ADMIN — MIDAZOLAM 1 MG: 1 INJECTION INTRAMUSCULAR; INTRAVENOUS at 11:25

## 2018-10-24 RX ADMIN — FENTANYL CITRATE 25 MCG: 50 INJECTION INTRAMUSCULAR; INTRAVENOUS at 11:33

## 2018-10-24 RX ADMIN — CIPROFLOXACIN 400 MG: 2 INJECTION, SOLUTION INTRAVENOUS at 10:38

## 2018-10-24 RX ADMIN — FENTANYL CITRATE 50 MCG: 50 INJECTION INTRAMUSCULAR; INTRAVENOUS at 11:25

## 2018-10-24 ASSESSMENT — PAIN SCALES - GENERAL
PAINLEVEL_OUTOF10: 0

## 2018-10-24 ASSESSMENT — PAIN - FUNCTIONAL ASSESSMENT: PAIN_FUNCTIONAL_ASSESSMENT: 0-10

## 2018-10-24 NOTE — PRE SEDATION
Meds:   Home Meds:   Prior to Admission medications    Medication Sig Start Date End Date Taking? Authorizing Provider   furosemide (LASIX) 20 MG tablet TAKE 1 TO 2 TABLETS DAILY AS NEEDED FOR LEG SWELLING. 10/16/18   Severo Matias MD   fentaNYL (DURAGESIC) 100 MCG/HR Place 1 patch onto the skin every 72 hours for 30 days. . 10/6/18 11/5/18  Iris Hernandez MD   apixaban Tarun ) 2.5 MG TABS tablet Take 1 tablet by mouth 2 times daily for 21 days 10/6/18 10/27/18  Iris Hernandez MD   Calcium Carbonate-Vitamin D (OYSTER SHELL CALCIUM/D) 500-200 MG-UNIT TABS Take 1 tablet by mouth 2 times daily 10/5/18   Iris Hernandez MD   magnesium oxide (MAG-OX) 400 (241.3 Mg) MG TABS tablet Take 1 tablet by mouth 2 times daily 10/5/18   Iris Hernandez MD   nicotine (NICODERM CQ) 7 MG/24HR Place 1 patch onto the skin every 24 hours Then stop using this patch after 2 weeks, and do not resume smoking. 10/5/18 10/5/19  Iris Hernandez MD   metoprolol succinate (TOPROL XL) 50 MG extended release tablet Take 1 tablet by mouth daily 9/28/18   DOREEN Burks NP   albuterol sulfate HFA (VENTOLIN HFA) 108 (90 Base) MCG/ACT inhaler Inhale 2 puffs into the lungs every 6 hours as needed for Wheezing 8/24/18   Severo Matias MD   mirtazapine (REMERON) 15 MG tablet TAKE 1 TABLET BY MOUTH NIGHTLY 7/30/18   Severo Matias MD   atorvastatin (LIPITOR) 20 MG tablet TAKE 1 TABLET BY MOUTH DAILY 5/15/18   Pam Miller MD   nortriptyline (PAMELOR) 75 MG capsule TAKE 1 CAPSULE BY MOUTH NIGHTLY 4/24/18   Severo Matias MD   aspirin EC 81 MG EC tablet Take 1 tablet by mouth daily 3/31/17   Pam Miller MD   fluticasone-vilanterol (BREO ELLIPTA) 100-25 MCG/INH AEPB 1 inhalation once a day. Rinse mouth out well after each use.   Patient taking differently: Inhale 1 puff into the lungs daily as needed  10/7/16   Severo Matias MD   sennosides-docusate sodium (SENOKOT-S) 8.6-50 MG tablet Take 2 tablets by mouth daily  Patient taking differently: Take 2 tablets by mouth daily as needed  4/27/16   Lizeth Husain MD   ondansetron [facility] 8 MG tablet Take 1 tablet by mouth every 8 hours as needed for Nausea or Vomiting 1/5/16   Teresa Lopez MD     Coumadin Use Last 7 Days:  no  Antiplatelet drug therapy use last 7 days: yes - asa  Other anticoagulant use last 7 days: no  Additional Medication Information:  n/a      Pre-Sedation Documentation and Exam:   I have reviewed the patient's history and review of systems.     Mallampati Airway Assessment:  Mallampati Class II - (soft palate, fauces & uvula are visible)    Prior History of Anesthesia Complications:   none    ASA Classification:  Class 3 - A patient with severe systemic disease that limits activity but is not incapacitating    Sedation/ Anesthesia Plan:   intravenous sedation    Medications Planned:   midazolam (Versed) intravenously and fentanyl intravenously    Patient is an appropriate candidate for plan of sedation: yes    Electronically signed by Demetrio Frausto MD on 10/24/2018 at 11:26 AM

## 2018-10-29 ENCOUNTER — TELEPHONE (OUTPATIENT)
Dept: SURGERY | Age: 63
End: 2018-10-29

## 2018-11-02 ENCOUNTER — TELEPHONE (OUTPATIENT)
Dept: INTERNAL MEDICINE CLINIC | Age: 63
End: 2018-11-02

## 2018-11-02 DIAGNOSIS — T45.1X5A CHEMOTHERAPY-INDUCED NEUROPATHY (HCC): ICD-10-CM

## 2018-11-02 DIAGNOSIS — K62.89 PROCTODYNIA: ICD-10-CM

## 2018-11-02 DIAGNOSIS — G62.0 CHEMOTHERAPY-INDUCED NEUROPATHY (HCC): ICD-10-CM

## 2018-11-02 RX ORDER — NORTRIPTYLINE HYDROCHLORIDE 75 MG/1
CAPSULE ORAL
Qty: 90 CAPSULE | Refills: 1 | Status: SHIPPED | OUTPATIENT
Start: 2018-11-02 | End: 2019-03-29 | Stop reason: SDUPTHER

## 2018-11-05 DIAGNOSIS — R60.0 PEDAL EDEMA: ICD-10-CM

## 2018-11-05 DIAGNOSIS — N18.30 CHRONIC KIDNEY DISEASE, STAGE III (MODERATE) (HCC): ICD-10-CM

## 2018-11-06 ENCOUNTER — HOSPITAL ENCOUNTER (OUTPATIENT)
Dept: INTERVENTIONAL RADIOLOGY/VASCULAR | Age: 63
Discharge: HOME | End: 2018-11-06
Payer: MEDICARE

## 2018-11-06 VITALS
SYSTOLIC BLOOD PRESSURE: 132 MMHG | OXYGEN SATURATION: 98 % | BODY MASS INDEX: 23.32 KG/M2 | WEIGHT: 140 LBS | HEART RATE: 68 BPM | HEIGHT: 65 IN | DIASTOLIC BLOOD PRESSURE: 76 MMHG | RESPIRATION RATE: 14 BRPM | TEMPERATURE: 98 F

## 2018-11-06 DIAGNOSIS — N13.30 HYDRONEPHROSIS, UNSPECIFIED HYDRONEPHROSIS TYPE: ICD-10-CM

## 2018-11-06 LAB
INR BLD: 1.39 (ref 0.86–1.14)
PLATELET # BLD: 167 K/UL (ref 135–450)
PROTHROMBIN TIME: 15.8 SEC (ref 9.8–13)

## 2018-11-06 PROCEDURE — 85610 PROTHROMBIN TIME: CPT

## 2018-11-06 PROCEDURE — 2709999900 IR GUIDED NEPHROSTOMY CATH PLACEMENT

## 2018-11-06 PROCEDURE — 85049 AUTOMATED PLATELET COUNT: CPT

## 2018-11-06 PROCEDURE — 7100000010 HC PHASE II RECOVERY - FIRST 15 MIN

## 2018-11-06 PROCEDURE — 36415 COLL VENOUS BLD VENIPUNCTURE: CPT

## 2018-11-06 PROCEDURE — 6360000004 HC RX CONTRAST MEDICATION: Performed by: INTERNAL MEDICINE

## 2018-11-06 PROCEDURE — 7100000011 HC PHASE II RECOVERY - ADDTL 15 MIN

## 2018-11-06 PROCEDURE — 50432 PLMT NEPHROSTOMY CATHETER: CPT

## 2018-11-06 PROCEDURE — 6360000002 HC RX W HCPCS: Performed by: RADIOLOGY

## 2018-11-06 RX ORDER — SODIUM CHLORIDE 9 MG/ML
INJECTION, SOLUTION INTRAVENOUS CONTINUOUS
Status: DISCONTINUED | OUTPATIENT
Start: 2018-11-06 | End: 2018-11-07 | Stop reason: HOSPADM

## 2018-11-06 RX ORDER — ONDANSETRON 2 MG/ML
4 INJECTION INTRAMUSCULAR; INTRAVENOUS ONCE
Status: DISCONTINUED | OUTPATIENT
Start: 2018-11-06 | End: 2018-11-07 | Stop reason: HOSPADM

## 2018-11-06 RX ORDER — CIPROFLOXACIN 2 MG/ML
400 INJECTION, SOLUTION INTRAVENOUS
Status: COMPLETED | OUTPATIENT
Start: 2018-11-06 | End: 2018-11-06

## 2018-11-06 RX ORDER — FUROSEMIDE 20 MG/1
TABLET ORAL
Qty: 180 TABLET | Refills: 0 | Status: SHIPPED | OUTPATIENT
Start: 2018-11-06 | End: 2019-01-24 | Stop reason: SDUPTHER

## 2018-11-06 RX ADMIN — CIPROFLOXACIN 400 MG: 2 INJECTION, SOLUTION INTRAVENOUS at 12:12

## 2018-11-06 RX ADMIN — IOPAMIDOL 5 ML: 612 INJECTION, SOLUTION INTRAVENOUS at 12:22

## 2018-11-06 RX ADMIN — SODIUM CHLORIDE: 9 INJECTION, SOLUTION INTRAVENOUS at 10:59

## 2018-11-06 ASSESSMENT — PAIN DESCRIPTION - DESCRIPTORS: DESCRIPTORS: CRAMPING

## 2018-11-06 ASSESSMENT — PAIN - FUNCTIONAL ASSESSMENT: PAIN_FUNCTIONAL_ASSESSMENT: 0-10

## 2018-11-06 ASSESSMENT — PAIN SCALES - GENERAL: PAINLEVEL_OUTOF10: 0

## 2018-11-16 ENCOUNTER — HOSPITAL ENCOUNTER (OUTPATIENT)
Dept: VASCULAR LAB | Age: 63
Discharge: HOME | End: 2018-11-16
Payer: MEDICARE

## 2018-11-16 PROCEDURE — 93970 EXTREMITY STUDY: CPT

## 2018-11-19 RX ORDER — ATORVASTATIN CALCIUM 20 MG/1
20 TABLET, FILM COATED ORAL DAILY
Qty: 30 TABLET | Refills: 5 | Status: SHIPPED | OUTPATIENT
Start: 2018-11-19 | End: 2019-03-13 | Stop reason: SDUPTHER

## 2018-11-21 ENCOUNTER — HOSPITAL ENCOUNTER (OUTPATIENT)
Dept: INFUSION THERAPY | Age: 63
Setting detail: INFUSION SERIES
Discharge: HOME | End: 2018-11-21
Payer: MEDICARE

## 2018-11-21 VITALS
RESPIRATION RATE: 17 BRPM | TEMPERATURE: 97.7 F | DIASTOLIC BLOOD PRESSURE: 67 MMHG | SYSTOLIC BLOOD PRESSURE: 152 MMHG | HEART RATE: 87 BPM

## 2018-11-21 PROCEDURE — 36430 TRANSFUSION BLD/BLD COMPNT: CPT

## 2018-11-21 PROCEDURE — 2580000003 HC RX 258: Performed by: INTERNAL MEDICINE

## 2018-11-21 RX ORDER — METHADONE HYDROCHLORIDE 10 MG/1
TABLET ORAL
Status: ON HOLD | COMMUNITY
End: 2019-10-10 | Stop reason: HOSPADM

## 2018-11-21 RX ORDER — SODIUM CHLORIDE 0.9 % (FLUSH) 0.9 %
10 SYRINGE (ML) INJECTION PRN
Status: DISCONTINUED | OUTPATIENT
Start: 2018-11-21 | End: 2018-11-22 | Stop reason: HOSPADM

## 2018-11-21 RX ORDER — FENTANYL 100 UG/H
1 PATCH TRANSDERMAL
Status: ON HOLD | COMMUNITY
End: 2019-10-10 | Stop reason: SDUPTHER

## 2018-11-21 RX ORDER — 0.9 % SODIUM CHLORIDE 0.9 %
250 INTRAVENOUS SOLUTION INTRAVENOUS ONCE
Status: COMPLETED | OUTPATIENT
Start: 2018-11-21 | End: 2018-11-21

## 2018-11-21 RX ORDER — OXYCODONE HYDROCHLORIDE 30 MG/1
60-90 TABLET ORAL EVERY 4 HOURS PRN
Status: ON HOLD | COMMUNITY
End: 2019-10-10 | Stop reason: SDUPTHER

## 2018-11-21 RX ADMIN — SODIUM CHLORIDE 250 ML: 9 INJECTION, SOLUTION INTRAVENOUS at 08:59

## 2018-11-28 ENCOUNTER — OFFICE VISIT (OUTPATIENT)
Dept: ORTHOPEDIC SURGERY | Age: 63
End: 2018-11-28
Payer: MEDICARE

## 2018-11-28 VITALS — HEIGHT: 65 IN | WEIGHT: 140 LBS | RESPIRATION RATE: 16 BRPM | BODY MASS INDEX: 23.32 KG/M2

## 2018-11-28 DIAGNOSIS — S72.002A CLOSED DISPLACED FRACTURE OF LEFT FEMORAL NECK (HCC): ICD-10-CM

## 2018-11-28 DIAGNOSIS — M17.12 ARTHRITIS OF LEFT KNEE: ICD-10-CM

## 2018-11-28 DIAGNOSIS — S80.02XA CONTUSION OF LEFT KNEE, INITIAL ENCOUNTER: Primary | ICD-10-CM

## 2018-11-28 PROCEDURE — G8482 FLU IMMUNIZE ORDER/ADMIN: HCPCS | Performed by: ORTHOPAEDIC SURGERY

## 2018-11-28 PROCEDURE — 99214 OFFICE O/P EST MOD 30 MIN: CPT | Performed by: ORTHOPAEDIC SURGERY

## 2018-11-28 PROCEDURE — G8420 CALC BMI NORM PARAMETERS: HCPCS | Performed by: ORTHOPAEDIC SURGERY

## 2018-11-28 PROCEDURE — 3017F COLORECTAL CA SCREEN DOC REV: CPT | Performed by: ORTHOPAEDIC SURGERY

## 2018-11-28 PROCEDURE — 4004F PT TOBACCO SCREEN RCVD TLK: CPT | Performed by: ORTHOPAEDIC SURGERY

## 2018-11-28 PROCEDURE — G8427 DOCREV CUR MEDS BY ELIG CLIN: HCPCS | Performed by: ORTHOPAEDIC SURGERY

## 2018-11-28 PROCEDURE — 99024 POSTOP FOLLOW-UP VISIT: CPT | Performed by: ORTHOPAEDIC SURGERY

## 2018-11-28 RX ORDER — NAPROXEN 500 MG/1
500 TABLET ORAL 2 TIMES DAILY WITH MEALS
Qty: 60 TABLET | Refills: 0 | Status: SHIPPED | OUTPATIENT
Start: 2018-11-28 | End: 2019-03-01

## 2018-11-29 ENCOUNTER — TELEPHONE (OUTPATIENT)
Dept: ORTHOPEDIC SURGERY | Age: 63
End: 2018-11-29

## 2018-11-29 DIAGNOSIS — M17.12 ARTHRITIS OF LEFT KNEE: Primary | ICD-10-CM

## 2018-11-29 NOTE — TELEPHONE ENCOUNTER
Patient was seen in office yesterday and referred to outpatient PT for left knee    She states that she would like to go to [facility] for this instead of [facility] ( she already goes to this facilty for a different ortho issue)    Can we update PT orders and fax to [phone]

## 2018-12-07 ENCOUNTER — ANESTHESIA EVENT (OUTPATIENT)
Dept: OPERATING ROOM | Age: 63
End: 2018-12-07
Payer: MEDICARE

## 2018-12-13 ENCOUNTER — ANESTHESIA (OUTPATIENT)
Dept: OPERATING ROOM | Age: 63
End: 2018-12-13
Payer: MEDICARE

## 2018-12-13 ENCOUNTER — HOSPITAL ENCOUNTER (OUTPATIENT)
Age: 63
Setting detail: OUTPATIENT SURGERY
Discharge: HOME | End: 2018-12-13
Attending: OBSTETRICS & GYNECOLOGY | Admitting: OBSTETRICS & GYNECOLOGY
Payer: MEDICARE

## 2018-12-13 VITALS
BODY MASS INDEX: 25.53 KG/M2 | RESPIRATION RATE: 18 BRPM | HEART RATE: 75 BPM | DIASTOLIC BLOOD PRESSURE: 65 MMHG | TEMPERATURE: 97 F | OXYGEN SATURATION: 98 % | HEIGHT: 65 IN | WEIGHT: 153.22 LBS | SYSTOLIC BLOOD PRESSURE: 133 MMHG

## 2018-12-13 VITALS
OXYGEN SATURATION: 100 % | SYSTOLIC BLOOD PRESSURE: 104 MMHG | DIASTOLIC BLOOD PRESSURE: 55 MMHG | RESPIRATION RATE: 38 BRPM

## 2018-12-13 PROCEDURE — 3700000000 HC ANESTHESIA ATTENDED CARE: Performed by: OBSTETRICS & GYNECOLOGY

## 2018-12-13 PROCEDURE — 7100000010 HC PHASE II RECOVERY - FIRST 15 MIN: Performed by: OBSTETRICS & GYNECOLOGY

## 2018-12-13 PROCEDURE — 6360000002 HC RX W HCPCS: Performed by: NURSE ANESTHETIST, CERTIFIED REGISTERED

## 2018-12-13 PROCEDURE — 7100000001 HC PACU RECOVERY - ADDTL 15 MIN: Performed by: OBSTETRICS & GYNECOLOGY

## 2018-12-13 PROCEDURE — 3700000001 HC ADD 15 MINUTES (ANESTHESIA): Performed by: OBSTETRICS & GYNECOLOGY

## 2018-12-13 PROCEDURE — 3600000013 HC SURGERY LEVEL 3 ADDTL 15MIN: Performed by: OBSTETRICS & GYNECOLOGY

## 2018-12-13 PROCEDURE — 2580000003 HC RX 258: Performed by: OBSTETRICS & GYNECOLOGY

## 2018-12-13 PROCEDURE — 7100000011 HC PHASE II RECOVERY - ADDTL 15 MIN: Performed by: OBSTETRICS & GYNECOLOGY

## 2018-12-13 PROCEDURE — 2580000003 HC RX 258: Performed by: ANESTHESIOLOGY

## 2018-12-13 PROCEDURE — 3600000003 HC SURGERY LEVEL 3 BASE: Performed by: OBSTETRICS & GYNECOLOGY

## 2018-12-13 PROCEDURE — 2500000003 HC RX 250 WO HCPCS: Performed by: NURSE ANESTHETIST, CERTIFIED REGISTERED

## 2018-12-13 PROCEDURE — 2709999900 HC NON-CHARGEABLE SUPPLY: Performed by: OBSTETRICS & GYNECOLOGY

## 2018-12-13 PROCEDURE — 7100000000 HC PACU RECOVERY - FIRST 15 MIN: Performed by: OBSTETRICS & GYNECOLOGY

## 2018-12-13 PROCEDURE — 2500000003 HC RX 250 WO HCPCS: Performed by: OBSTETRICS & GYNECOLOGY

## 2018-12-13 RX ORDER — LIDOCAINE HYDROCHLORIDE 20 MG/ML
INJECTION, SOLUTION INFILTRATION; PERINEURAL PRN
Status: DISCONTINUED | OUTPATIENT
Start: 2018-12-13 | End: 2018-12-13 | Stop reason: SDUPTHER

## 2018-12-13 RX ORDER — FENTANYL CITRATE 50 UG/ML
25 INJECTION, SOLUTION INTRAMUSCULAR; INTRAVENOUS EVERY 5 MIN PRN
Status: DISCONTINUED | OUTPATIENT
Start: 2018-12-13 | End: 2018-12-13 | Stop reason: HOSPADM

## 2018-12-13 RX ORDER — FENTANYL CITRATE 50 UG/ML
50 INJECTION, SOLUTION INTRAMUSCULAR; INTRAVENOUS EVERY 5 MIN PRN
Status: DISCONTINUED | OUTPATIENT
Start: 2018-12-13 | End: 2018-12-13 | Stop reason: HOSPADM

## 2018-12-13 RX ORDER — GLYCOPYRROLATE 0.2 MG/ML
INJECTION INTRAMUSCULAR; INTRAVENOUS PRN
Status: DISCONTINUED | OUTPATIENT
Start: 2018-12-13 | End: 2018-12-13 | Stop reason: SDUPTHER

## 2018-12-13 RX ORDER — MEPERIDINE HYDROCHLORIDE 25 MG/ML
12.5 INJECTION INTRAMUSCULAR; INTRAVENOUS; SUBCUTANEOUS EVERY 5 MIN PRN
Status: DISCONTINUED | OUTPATIENT
Start: 2018-12-13 | End: 2018-12-13 | Stop reason: HOSPADM

## 2018-12-13 RX ORDER — FENTANYL CITRATE 50 UG/ML
INJECTION, SOLUTION INTRAMUSCULAR; INTRAVENOUS PRN
Status: DISCONTINUED | OUTPATIENT
Start: 2018-12-13 | End: 2018-12-13 | Stop reason: SDUPTHER

## 2018-12-13 RX ORDER — ONDANSETRON 2 MG/ML
INJECTION INTRAMUSCULAR; INTRAVENOUS PRN
Status: DISCONTINUED | OUTPATIENT
Start: 2018-12-13 | End: 2018-12-13 | Stop reason: SDUPTHER

## 2018-12-13 RX ORDER — SODIUM CHLORIDE 0.9 % (FLUSH) 0.9 %
10 SYRINGE (ML) INJECTION EVERY 12 HOURS SCHEDULED
Status: DISCONTINUED | OUTPATIENT
Start: 2018-12-13 | End: 2018-12-13 | Stop reason: HOSPADM

## 2018-12-13 RX ORDER — OXYCODONE HYDROCHLORIDE AND ACETAMINOPHEN 5; 325 MG/1; MG/1
1 TABLET ORAL PRN
Status: DISCONTINUED | OUTPATIENT
Start: 2018-12-13 | End: 2018-12-13 | Stop reason: HOSPADM

## 2018-12-13 RX ORDER — DEXAMETHASONE SODIUM PHOSPHATE 4 MG/ML
INJECTION, SOLUTION INTRA-ARTICULAR; INTRALESIONAL; INTRAMUSCULAR; INTRAVENOUS; SOFT TISSUE PRN
Status: DISCONTINUED | OUTPATIENT
Start: 2018-12-13 | End: 2018-12-13 | Stop reason: SDUPTHER

## 2018-12-13 RX ORDER — MORPHINE SULFATE 2 MG/ML
1 INJECTION, SOLUTION INTRAMUSCULAR; INTRAVENOUS EVERY 5 MIN PRN
Status: DISCONTINUED | OUTPATIENT
Start: 2018-12-13 | End: 2018-12-13 | Stop reason: HOSPADM

## 2018-12-13 RX ORDER — OXYCODONE HYDROCHLORIDE AND ACETAMINOPHEN 5; 325 MG/1; MG/1
2 TABLET ORAL PRN
Status: DISCONTINUED | OUTPATIENT
Start: 2018-12-13 | End: 2018-12-13 | Stop reason: HOSPADM

## 2018-12-13 RX ORDER — SODIUM CHLORIDE 0.9 % (FLUSH) 0.9 %
10 SYRINGE (ML) INJECTION PRN
Status: DISCONTINUED | OUTPATIENT
Start: 2018-12-13 | End: 2018-12-13 | Stop reason: HOSPADM

## 2018-12-13 RX ORDER — MORPHINE SULFATE 2 MG/ML
2 INJECTION, SOLUTION INTRAMUSCULAR; INTRAVENOUS EVERY 5 MIN PRN
Status: DISCONTINUED | OUTPATIENT
Start: 2018-12-13 | End: 2018-12-13 | Stop reason: HOSPADM

## 2018-12-13 RX ORDER — ONDANSETRON 2 MG/ML
4 INJECTION INTRAMUSCULAR; INTRAVENOUS
Status: DISCONTINUED | OUTPATIENT
Start: 2018-12-13 | End: 2018-12-13 | Stop reason: HOSPADM

## 2018-12-13 RX ORDER — SODIUM CHLORIDE 9 MG/ML
INJECTION, SOLUTION INTRAVENOUS CONTINUOUS
Status: DISCONTINUED | OUTPATIENT
Start: 2018-12-13 | End: 2018-12-13 | Stop reason: HOSPADM

## 2018-12-13 RX ORDER — MIDAZOLAM HYDROCHLORIDE 1 MG/ML
INJECTION INTRAMUSCULAR; INTRAVENOUS PRN
Status: DISCONTINUED | OUTPATIENT
Start: 2018-12-13 | End: 2018-12-13 | Stop reason: SDUPTHER

## 2018-12-13 RX ORDER — PROPOFOL 10 MG/ML
INJECTION, EMULSION INTRAVENOUS PRN
Status: DISCONTINUED | OUTPATIENT
Start: 2018-12-13 | End: 2018-12-13 | Stop reason: SDUPTHER

## 2018-12-13 RX ADMIN — MIDAZOLAM 1 MG: 1 INJECTION INTRAMUSCULAR; INTRAVENOUS at 10:52

## 2018-12-13 RX ADMIN — FENTANYL CITRATE 50 MCG: 50 INJECTION INTRAMUSCULAR; INTRAVENOUS at 10:52

## 2018-12-13 RX ADMIN — GLYCOPYRROLATE 0.1 MG: 0.2 INJECTION, SOLUTION INTRAMUSCULAR; INTRAVENOUS at 10:52

## 2018-12-13 RX ADMIN — DEXAMETHASONE SODIUM PHOSPHATE 6 MG: 4 INJECTION, SOLUTION INTRAMUSCULAR; INTRAVENOUS at 11:03

## 2018-12-13 RX ADMIN — FENTANYL CITRATE 25 MCG: 50 INJECTION INTRAMUSCULAR; INTRAVENOUS at 11:08

## 2018-12-13 RX ADMIN — ONDANSETRON 4 MG: 2 INJECTION INTRAMUSCULAR; INTRAVENOUS at 11:03

## 2018-12-13 RX ADMIN — LIDOCAINE HYDROCHLORIDE 4 ML: 20 INJECTION, SOLUTION INFILTRATION; PERINEURAL at 10:58

## 2018-12-13 RX ADMIN — MIDAZOLAM 1 MG: 1 INJECTION INTRAMUSCULAR; INTRAVENOUS at 10:58

## 2018-12-13 RX ADMIN — CIPROFLOXACIN 400 MG: 2 INJECTION, SOLUTION INTRAVENOUS at 11:05

## 2018-12-13 RX ADMIN — SODIUM CHLORIDE: 9 INJECTION, SOLUTION INTRAVENOUS at 10:43

## 2018-12-13 RX ADMIN — PROPOFOL 175 MG: 10 INJECTION, EMULSION INTRAVENOUS at 10:58

## 2018-12-13 ASSESSMENT — PULMONARY FUNCTION TESTS
PIF_VALUE: 14
PIF_VALUE: 10
PIF_VALUE: 9
PIF_VALUE: 1
PIF_VALUE: 0
PIF_VALUE: 14
PIF_VALUE: 1
PIF_VALUE: 10
PIF_VALUE: 5
PIF_VALUE: 10
PIF_VALUE: 9
PIF_VALUE: 10
PIF_VALUE: 14
PIF_VALUE: 14
PIF_VALUE: 9
PIF_VALUE: 14
PIF_VALUE: 1
PIF_VALUE: 14
PIF_VALUE: 10
PIF_VALUE: 9
PIF_VALUE: 9
PIF_VALUE: 14
PIF_VALUE: 10
PIF_VALUE: 1
PIF_VALUE: 9

## 2018-12-13 ASSESSMENT — LIFESTYLE VARIABLES: SMOKING_STATUS: 1

## 2018-12-13 ASSESSMENT — PAIN - FUNCTIONAL ASSESSMENT: PAIN_FUNCTIONAL_ASSESSMENT: 0-10

## 2018-12-13 ASSESSMENT — PAIN SCALES - GENERAL: PAINLEVEL_OUTOF10: 0

## 2018-12-13 ASSESSMENT — ENCOUNTER SYMPTOMS: SHORTNESS OF BREATH: 1

## 2019-01-01 DIAGNOSIS — F32.9 REACTIVE DEPRESSION (SITUATIONAL): ICD-10-CM

## 2019-01-01 DIAGNOSIS — R63.4 ABNORMAL WEIGHT LOSS: ICD-10-CM

## 2019-01-02 RX ORDER — MIRTAZAPINE 15 MG/1
TABLET, FILM COATED ORAL
Qty: 30 TABLET | Refills: 5 | Status: SHIPPED | OUTPATIENT
Start: 2019-01-02 | End: 2019-05-26 | Stop reason: SDUPTHER

## 2019-01-04 ENCOUNTER — HOSPITAL ENCOUNTER (OUTPATIENT)
Dept: INTERVENTIONAL RADIOLOGY/VASCULAR | Age: 64
Discharge: HOME | End: 2019-01-04
Payer: MEDICARE

## 2019-01-04 VITALS
OXYGEN SATURATION: 99 % | TEMPERATURE: 97 F | WEIGHT: 163.14 LBS | BODY MASS INDEX: 27.18 KG/M2 | HEART RATE: 100 BPM | SYSTOLIC BLOOD PRESSURE: 136 MMHG | RESPIRATION RATE: 16 BRPM | HEIGHT: 65 IN | DIASTOLIC BLOOD PRESSURE: 76 MMHG

## 2019-01-04 DIAGNOSIS — N13.30 HYDRONEPHROSIS, UNSPECIFIED HYDRONEPHROSIS TYPE: ICD-10-CM

## 2019-01-04 LAB
INR BLD: 1.35 (ref 0.86–1.14)
PLATELET # BLD: 175 K/UL (ref 135–450)
PROTHROMBIN TIME: 15.4 SEC (ref 9.8–13)

## 2019-01-04 PROCEDURE — 6360000002 HC RX W HCPCS: Performed by: RADIOLOGY

## 2019-01-04 PROCEDURE — 7100000011 HC PHASE II RECOVERY - ADDTL 15 MIN

## 2019-01-04 PROCEDURE — C1769 GUIDE WIRE: HCPCS

## 2019-01-04 PROCEDURE — 50435 EXCHANGE NEPHROSTOMY CATH: CPT

## 2019-01-04 PROCEDURE — 85049 AUTOMATED PLATELET COUNT: CPT

## 2019-01-04 PROCEDURE — 6360000004 HC RX CONTRAST MEDICATION: Performed by: INTERNAL MEDICINE

## 2019-01-04 PROCEDURE — 7100000010 HC PHASE II RECOVERY - FIRST 15 MIN

## 2019-01-04 PROCEDURE — 99152 MOD SED SAME PHYS/QHP 5/>YRS: CPT

## 2019-01-04 PROCEDURE — 36415 COLL VENOUS BLD VENIPUNCTURE: CPT

## 2019-01-04 PROCEDURE — 85610 PROTHROMBIN TIME: CPT

## 2019-01-04 RX ORDER — 0.9 % SODIUM CHLORIDE 0.9 %
250 INTRAVENOUS SOLUTION INTRAVENOUS ONCE
Status: CANCELLED | OUTPATIENT
Start: 2019-01-04 | End: 2019-01-04

## 2019-01-04 RX ORDER — MIDAZOLAM HYDROCHLORIDE 1 MG/ML
INJECTION INTRAMUSCULAR; INTRAVENOUS DAILY PRN
Status: COMPLETED | OUTPATIENT
Start: 2019-01-04 | End: 2019-01-04

## 2019-01-04 RX ORDER — CLINDAMYCIN PHOSPHATE 600 MG/50ML
600 INJECTION INTRAVENOUS
Status: ACTIVE | OUTPATIENT
Start: 2019-01-04 | End: 2019-01-04

## 2019-01-04 RX ORDER — FENTANYL CITRATE 50 UG/ML
INJECTION, SOLUTION INTRAMUSCULAR; INTRAVENOUS DAILY PRN
Status: COMPLETED | OUTPATIENT
Start: 2019-01-04 | End: 2019-01-04

## 2019-01-04 RX ORDER — SODIUM CHLORIDE 0.9 % (FLUSH) 0.9 %
10 SYRINGE (ML) INJECTION PRN
Status: CANCELLED | OUTPATIENT
Start: 2019-01-04

## 2019-01-04 RX ADMIN — MIDAZOLAM 2 MG: 1 INJECTION INTRAMUSCULAR; INTRAVENOUS at 09:54

## 2019-01-04 RX ADMIN — IOPAMIDOL 5 ML: 755 INJECTION, SOLUTION INTRAVENOUS at 10:00

## 2019-01-04 RX ADMIN — FENTANYL CITRATE 50 MCG: 50 INJECTION INTRAMUSCULAR; INTRAVENOUS at 09:55

## 2019-01-04 ASSESSMENT — PAIN - FUNCTIONAL ASSESSMENT: PAIN_FUNCTIONAL_ASSESSMENT: 0-10

## 2019-01-04 ASSESSMENT — PAIN SCALES - GENERAL
PAINLEVEL_OUTOF10: 0
PAINLEVEL_OUTOF10: 0

## 2019-01-07 ENCOUNTER — HOSPITAL ENCOUNTER (OUTPATIENT)
Dept: INFUSION THERAPY | Age: 64
Setting detail: INFUSION SERIES
Discharge: HOME | End: 2019-01-07
Payer: MEDICARE

## 2019-01-07 LAB
ABO/RH: NORMAL
ANTIBODY SCREEN: NORMAL

## 2019-01-07 PROCEDURE — 86901 BLOOD TYPING SEROLOGIC RH(D): CPT

## 2019-01-07 PROCEDURE — 86923 COMPATIBILITY TEST ELECTRIC: CPT

## 2019-01-07 PROCEDURE — 86900 BLOOD TYPING SEROLOGIC ABO: CPT

## 2019-01-07 PROCEDURE — 36591 DRAW BLOOD OFF VENOUS DEVICE: CPT

## 2019-01-07 PROCEDURE — 36415 COLL VENOUS BLD VENIPUNCTURE: CPT

## 2019-01-07 PROCEDURE — 86850 RBC ANTIBODY SCREEN: CPT

## 2019-01-07 NOTE — PROGRESS NOTES
[facility] Access for Labs    NAME:  Viraj Briggs OF BIRTH:  1955  MEDICAL RECORD NUMBER:  [id number]  DATE:  1/7/2019    Patient arrived to [facility]   [] per wheelchair   [x] ambulatory     Alert and oriented X4, ambulatory with cane. States is sore secondary to falling out of bed and has had some falls due to dizziness. Post op hip replacement in October 2018. Has follow up with ortho on Wednesday 1/9/19. [facility] was unable to receive the blood last week because scheduled for nephrostomy tube change. Port Site Location:  right chest    Port Site:  Redness: No  Bruising: No   Edema: No  Pain: No     Port Site cleansed with:  Chloroprep Scrub for 30 seconds and air dried? Yes    Port Accessed with: 20 Gauge 1 inch Power Port needle using sterile technique. Blood return obtained: Yes    Labs:  Blood wasted: 10 ml  Labs drawn using a Syringe: Yes  Flushed with 20 ml Normal Saline using push-pause method. Port flushes without resistance. Port Deaccessed:  Yes covered with guaze and small tegaderm    Response to treatment:  Well tolerated by patient.     Education:    Verbalized understanding, scheduled to return tomorrow at 11 am for 1 unit PRBC    Electronically signed by Jocy Atkinson RN on 1/7/2019 at 12:46 PM

## 2019-01-08 ENCOUNTER — HOSPITAL ENCOUNTER (OUTPATIENT)
Dept: INFUSION THERAPY | Age: 64
Setting detail: INFUSION SERIES
Discharge: HOME | End: 2019-01-08
Payer: MEDICARE

## 2019-01-08 VITALS
OXYGEN SATURATION: 99 % | DIASTOLIC BLOOD PRESSURE: 64 MMHG | HEART RATE: 93 BPM | RESPIRATION RATE: 17 BRPM | TEMPERATURE: 98.4 F | SYSTOLIC BLOOD PRESSURE: 132 MMHG

## 2019-01-08 PROCEDURE — P9016 RBC LEUKOCYTES REDUCED: HCPCS

## 2019-01-08 PROCEDURE — 36430 TRANSFUSION BLD/BLD COMPNT: CPT

## 2019-01-08 PROCEDURE — 2580000003 HC RX 258: Performed by: INTERNAL MEDICINE

## 2019-01-08 RX ORDER — 0.9 % SODIUM CHLORIDE 0.9 %
250 INTRAVENOUS SOLUTION INTRAVENOUS ONCE
Status: COMPLETED | OUTPATIENT
Start: 2019-01-08 | End: 2019-01-08

## 2019-01-08 RX ORDER — SODIUM CHLORIDE 9 MG/ML
INJECTION, SOLUTION INTRAVENOUS
Status: DISPENSED
Start: 2019-01-08 | End: 2019-01-08

## 2019-01-08 RX ORDER — SODIUM CHLORIDE 0.9 % (FLUSH) 0.9 %
10 SYRINGE (ML) INJECTION PRN
Status: DISCONTINUED | OUTPATIENT
Start: 2019-01-08 | End: 2019-01-09 | Stop reason: HOSPADM

## 2019-01-08 RX ADMIN — Medication 10 ML: at 14:22

## 2019-01-08 RX ADMIN — SODIUM CHLORIDE 250 ML: 9 INJECTION, SOLUTION INTRAVENOUS at 12:00

## 2019-01-08 NOTE — PROGRESS NOTES
[address] [facility]    Blood Transfusion    NAME:  Marcelle Mcdonald OF BIRTH:  1955  MEDICAL RECORD NUMBER:  [id number]  DATE:  1/8/2019     Patient arrived to [facility]   [] per wheelchair   [x] ambulatory     With a cane    Consent Obtained: Yes  Is this the patient's first Transfusion? No    Did the patient experience any adverse reactions to previous Transfusion? No  Patient Active Problem List   Diagnosis    Adjustment reaction with prolonged depressive reaction    Asthma    Radiation proctitis    Essential hypertension    Colostomy status ([address]    Cervical cancer ([address]    Proctodynia    Malignant neoplasm of sigmoid colon ([address]    Tobacco dependence    Asymptomatic gallstones    Metastasis to liver (HCC)    Insomnia    Urinary incontinence    Vaginal discharge    History of radiation therapy    Metastatic adenocarcinoma to lung (HCC)    Chemotherapy-induced neuropathy (HCC)    Constipation due to pain medication    Rectovaginal fistula    Vesico-vaginal fistula    Cancer associated pain    Nephrostomy status (HCC)    Chronic kidney disease, stage III (moderate) (HCC)    Mild protein-calorie malnutrition (HCC)    Anemia, iron deficiency    Right leg claudication ([address]    Right iliac artery stenosis (HCC)    History of hypertension resolved with weight loss    Hypokalemia    Amputated toe of right foot (HCC) 4th toe due to ischemia    Closed fracture of neck of left femur (HCC)    Closed displaced fracture of left femoral neck (HCC)    Tachycardia    Hip fracture, left, closed, with routine healing, subsequent encounter    Arthritis of left knee     Patient with Bone Marrow Suppression due to chemotherapy? Yes    Patient with history of GI bleeding? No  Patient with history of CHF? No  Patient with history of COPD?  No    Hemoglobin/Hematocrit:      Special Transfusion Products Requested: No  Blood was:  []  Irradiated [] Washed    [] Other    Is the patient experiencing any:  Fatigue:   []   None  []   Increase over baseline but not altering normal activities  [x]   Moderate of causing difficulty performing some activities  []   Severe or loss of ability to perform some activities  []   Bedridden or disabling    Dizziness or Lightheadedness:   [x]   None  []   No Interference  []   Interferes with functioning but not activities of daily living  []   Interferes with daily activies  []   Bedridden or disabling    Shortness of Breath:   []   None   [x]   Dyspneic on exertion   []   Dyspnea with normal activities  []   Dyspnea at rest    Breath Sounds: No increased work of breathing, Breath sounds clear to auscultation bilaterally and Good air exchange    Edema: 4+ Location of edema: bilateral feet to thighs . ..wears support hose. Tachycardia: No    Heart Palpitations: No      Chest Pain: No    Premedicated: none ordered    [] Tylenol 325 mg oral  [] Tylenol 650 mg oral    [] Benadryl 25 mg oral   [] Benadryl 50 mg oral  [] Other    Administered Blood via: [] Peripheral access    [] PICC access    [x] Port access    Numbers of units transfused 1 over 2 hours    Monitoring of vital signs and rate changes are documented in flow sheets. Response to treatment:  Well tolerated by patient.     Education:    Verbalized understanding      Electronically signed by Larissa Banegas RN on 1/8/2019 at 11:56 AM

## 2019-01-09 ENCOUNTER — OFFICE VISIT (OUTPATIENT)
Dept: ORTHOPEDIC SURGERY | Age: 64
End: 2019-01-09
Payer: MEDICARE

## 2019-01-09 VITALS — WEIGHT: 163 LBS | HEIGHT: 65 IN | BODY MASS INDEX: 27.16 KG/M2 | RESPIRATION RATE: 16 BRPM

## 2019-01-09 DIAGNOSIS — S72.002A CLOSED FRACTURE OF NECK OF LEFT FEMUR, INITIAL ENCOUNTER (HCC): Primary | ICD-10-CM

## 2019-01-09 PROCEDURE — 99213 OFFICE O/P EST LOW 20 MIN: CPT | Performed by: ORTHOPAEDIC SURGERY

## 2019-01-09 PROCEDURE — G8419 CALC BMI OUT NRM PARAM NOF/U: HCPCS | Performed by: ORTHOPAEDIC SURGERY

## 2019-01-09 PROCEDURE — 4004F PT TOBACCO SCREEN RCVD TLK: CPT | Performed by: ORTHOPAEDIC SURGERY

## 2019-01-09 PROCEDURE — G8482 FLU IMMUNIZE ORDER/ADMIN: HCPCS | Performed by: ORTHOPAEDIC SURGERY

## 2019-01-09 PROCEDURE — G8427 DOCREV CUR MEDS BY ELIG CLIN: HCPCS | Performed by: ORTHOPAEDIC SURGERY

## 2019-01-09 PROCEDURE — 3017F COLORECTAL CA SCREEN DOC REV: CPT | Performed by: ORTHOPAEDIC SURGERY

## 2019-01-24 ENCOUNTER — TELEPHONE (OUTPATIENT)
Dept: INTERNAL MEDICINE CLINIC | Age: 64
End: 2019-01-24

## 2019-01-24 DIAGNOSIS — N18.30 CHRONIC KIDNEY DISEASE, STAGE III (MODERATE) (HCC): ICD-10-CM

## 2019-01-24 DIAGNOSIS — R60.0 PEDAL EDEMA: ICD-10-CM

## 2019-01-24 RX ORDER — FUROSEMIDE 20 MG/1
TABLET ORAL
Qty: 180 TABLET | Refills: 0 | Status: SHIPPED | OUTPATIENT
Start: 2019-01-24 | End: 2019-04-17

## 2019-02-01 LAB
ALBUMIN SERPL-MCNC: 2.5 G/DL
ALP BLD-CCNC: 209 U/L
ALT SERPL-CCNC: 12 U/L
ANION GAP SERPL CALCULATED.3IONS-SCNC: NORMAL MMOL/L
AST SERPL-CCNC: 18 U/L
BILIRUB SERPL-MCNC: 0.3 MG/DL (ref 0.1–1.4)
BUN BLDV-MCNC: 30 MG/DL
CALCIUM SERPL-MCNC: 8.3 MG/DL
CHLORIDE BLD-SCNC: 106 MMOL/L
CO2: 25 MMOL/L
CREAT SERPL-MCNC: 1.4 MG/DL
GFR CALCULATED: 38.2
GLUCOSE BLD-MCNC: 106 MG/DL
POTASSIUM SERPL-SCNC: 4 MMOL/L
SODIUM BLD-SCNC: 136 MMOL/L
TOTAL PROTEIN: 5.6

## 2019-02-28 ENCOUNTER — HOSPITAL ENCOUNTER (OUTPATIENT)
Dept: INFUSION THERAPY | Age: 64
Setting detail: INFUSION SERIES
Discharge: HOME | End: 2019-02-28
Payer: MEDICARE

## 2019-02-28 LAB
ABO/RH: NORMAL
ANTIBODY SCREEN: NORMAL
BASOPHILS ABSOLUTE: ABNORMAL /ΜL
BASOPHILS RELATIVE PERCENT: ABNORMAL %
EOSINOPHILS ABSOLUTE: ABNORMAL /ΜL
EOSINOPHILS RELATIVE PERCENT: ABNORMAL %
HCT VFR BLD CALC: 24.1 % (ref 36–46)
HEMOGLOBIN: 7.5 G/DL (ref 12–16)
LYMPHOCYTES ABSOLUTE: ABNORMAL /ΜL
LYMPHOCYTES RELATIVE PERCENT: ABNORMAL %
MCH RBC QN AUTO: ABNORMAL PG
MCHC RBC AUTO-ENTMCNC: ABNORMAL G/DL
MCV RBC AUTO: ABNORMAL FL
MONOCYTES ABSOLUTE: ABNORMAL /ΜL
MONOCYTES RELATIVE PERCENT: ABNORMAL %
NEUTROPHILS ABSOLUTE: ABNORMAL /ΜL
NEUTROPHILS RELATIVE PERCENT: ABNORMAL %
PDW BLD-RTO: ABNORMAL %
PLATELET # BLD: 160 K/ΜL
PMV BLD AUTO: ABNORMAL FL
RBC # BLD: 2.55 10^6/ΜL
WBC # BLD: 4.4 10^3/ML

## 2019-02-28 PROCEDURE — 36591 DRAW BLOOD OFF VENOUS DEVICE: CPT

## 2019-02-28 PROCEDURE — 86900 BLOOD TYPING SEROLOGIC ABO: CPT

## 2019-02-28 PROCEDURE — 86923 COMPATIBILITY TEST ELECTRIC: CPT

## 2019-02-28 PROCEDURE — 36415 COLL VENOUS BLD VENIPUNCTURE: CPT

## 2019-02-28 PROCEDURE — 86901 BLOOD TYPING SEROLOGIC RH(D): CPT

## 2019-02-28 PROCEDURE — 86850 RBC ANTIBODY SCREEN: CPT

## 2019-02-28 RX ORDER — SODIUM CHLORIDE 0.9 % (FLUSH) 0.9 %
10 SYRINGE (ML) INJECTION PRN
Status: CANCELLED | OUTPATIENT
Start: 2019-02-28

## 2019-02-28 RX ORDER — 0.9 % SODIUM CHLORIDE 0.9 %
250 INTRAVENOUS SOLUTION INTRAVENOUS ONCE
Status: CANCELLED | OUTPATIENT
Start: 2019-02-28 | End: 2019-02-28

## 2019-02-28 NOTE — PROGRESS NOTES
[facility] [facility] Access for Labs    NAME:  Christy Check OF BIRTH:  1955  MEDICAL RECORD NUMBER:  [id number]  DATE:  2/28/2019    Patient arrived to [facility] 58   [] per wheelchair   [x] ambulatory     Alert and oriented X4, saw Dr. Shanelle George today and HGB 7.5, c/o fatigue and SOB on exertion. Scheduled to receive 1 unit PRBC tomorrow    Port Site Location:  right chest    Port Site:  Redness: No  Bruising: No   Edema: No  Pain: No     Port Site cleansed with:  Chloroprep Scrub for 30 seconds and air dried? Yes    Port Accessed with: 20 Gauge 1 inch Power Port needle using sterile technique. Blood return obtained: Yes    Labs:  Blood wasted: 10 ml  Labs drawn using a Vacutainer: Yes  Flushed with 20 ml Normal Saline using push-pause method. Port flushes without resistance. Port Deaccessed:  Yes    Response to treatment:  Well tolerated by patient.     Education:    Verbalized understanding, due to return at 9:30 AM tomorrow    Electronically signed by Champ Gonzalez RN on 2/28/2019 at 3:27 PM

## 2019-03-01 ENCOUNTER — HOSPITAL ENCOUNTER (OUTPATIENT)
Dept: INFUSION THERAPY | Age: 64
Setting detail: INFUSION SERIES
Discharge: HOME | End: 2019-03-01
Payer: MEDICARE

## 2019-03-01 VITALS
DIASTOLIC BLOOD PRESSURE: 54 MMHG | OXYGEN SATURATION: 98 % | TEMPERATURE: 97.9 F | RESPIRATION RATE: 17 BRPM | HEART RATE: 86 BPM | SYSTOLIC BLOOD PRESSURE: 90 MMHG

## 2019-03-01 PROCEDURE — 2580000003 HC RX 258: Performed by: INTERNAL MEDICINE

## 2019-03-01 PROCEDURE — 2580000003 HC RX 258

## 2019-03-01 PROCEDURE — P9016 RBC LEUKOCYTES REDUCED: HCPCS

## 2019-03-01 PROCEDURE — 36430 TRANSFUSION BLD/BLD COMPNT: CPT

## 2019-03-01 RX ORDER — SODIUM CHLORIDE 0.9 % (FLUSH) 0.9 %
10 SYRINGE (ML) INJECTION PRN
Status: DISCONTINUED | OUTPATIENT
Start: 2019-03-01 | End: 2019-03-02 | Stop reason: HOSPADM

## 2019-03-01 RX ORDER — 0.9 % SODIUM CHLORIDE 0.9 %
250 INTRAVENOUS SOLUTION INTRAVENOUS ONCE
Status: COMPLETED | OUTPATIENT
Start: 2019-03-01 | End: 2019-03-01

## 2019-03-01 RX ORDER — SODIUM CHLORIDE 9 MG/ML
INJECTION, SOLUTION INTRAVENOUS
Status: COMPLETED
Start: 2019-03-01 | End: 2019-03-01

## 2019-03-01 RX ADMIN — Medication 20 ML: at 13:10

## 2019-03-01 RX ADMIN — Medication 250 ML: at 10:08

## 2019-03-01 RX ADMIN — SODIUM CHLORIDE 250 ML: 9 INJECTION, SOLUTION INTRAVENOUS at 10:08

## 2019-03-01 ASSESSMENT — PAIN SCALES - GENERAL
PAINLEVEL_OUTOF10: 0
PAINLEVEL_OUTOF10: 0

## 2019-03-01 NOTE — PROGRESS NOTES
Outpatient [address] Access    NAME:  Roman Rule OF BIRTH:  1955  MEDICAL RECORD NUMBER:  [id number]  DATE:  3/1/2019    Patient arrived to [place] [address]   [] per wheelchair   [x] ambulatory     Port Site Location:  right chest  Port Site:  Redness: No  Bruising: No   Edema: No  Pain: No     Port Site cleansed with:  Chloroprep Scrub for 30 seconds and air dried? Yes     Port Accessed with: 20 Gauge 3/4 inch  Power Port non coring needle using sterile technique. Blood return obtained: Yes  Flushed with 10 ml Normal Saline using push-pause method. Port flushes without resistance. Response to treatment:  Well tolerated by patient. Electronically signed by Neal Peterson RN on 3/1/2019 at 9:51 AM    Outpatient [place] [address]    Blood Transfusion    NAME:  Ethel Brush:  1955  MEDICAL RECORD NUMBER:  [id number]  DATE:  3/1/2019     Patient arrived to [place] [address]   [] per wheelchair   [x] ambulatory         Patient with past history of Cervical cancer in 2010 which was treated with chemo and radiation which caused vesicovaginal fistula and rectal pain. Presently has bilateral percutaneous nephrostomy tubes and colostomy. Developed secondary colon cancer with lung and liver mets. Currently not on chemo. Has vaginal bleeding due to past radiation treatment. Consent Obtained: Yes  Is this the patient's first Transfusion? No    Did the patient experience any adverse reactions to previous Transfusion?  No  Patient Active Problem List   Diagnosis    Adjustment reaction with prolonged depressive reaction    Asthma    Radiation proctitis    Essential hypertension    [facility]ostomy status ([facility]    Cervical cancer ([facility]    Proctodynia    Malignant neoplasm of sigmoid colon ([address]    Tobacco dependence    Asymptomatic gallstones    Metastasis to liver ([address]    Insomnia    Urinary incontinence    Vaginal discharge    History of radiation therapy    Metastatic adenocarcinoma to lung (HCC)    Chemotherapy-induced neuropathy (HCC)    Constipation due to pain medication    Rectovaginal fistula    Vesico-vaginal fistula    Cancer associated pain    Nephrostomy status (HCC)    Chronic kidney disease, stage III (moderate) (HCC)    Mild protein-calorie malnutrition (HCC)    Anemia, iron deficiency    Right leg claudication ([address]    Right iliac artery stenosis (HCC)    History of hypertension resolved with weight loss    Hypokalemia    Amputated toe of right foot (HCC) 4th toe due to ischemia    Closed fracture of neck of left femur (HCC)    Closed displaced fracture of left femoral neck (HCC)    Tachycardia    Hip fracture, left, closed, with routine healing, subsequent encounter    Arthritis of left knee     Patient with Bone Marrow Suppression due to chemotherapy? Yes    Patient with history of GI bleeding? No  Patient with history of CHF? No  Patient with history of COPD?  No    Hemoglobin/Hematocrit:    Lab Results   Component Value Date    HGB 9.0 10/03/2018    HCT 26.8 10/03/2018       Special Transfusion Products Requested: No  Blood was:  []  Irradiated    [] Washed    [] Other    Is the patient experiencing any:  Fatigue:   []   None  []   Increase over baseline but not altering normal activities  [x]   Moderate of causing difficulty performing some activities  []   Severe or loss of ability to perform some activities  []   Bedridden or disabling    Dizziness or Lightheadedness:   [x]   None  []   No Interference  []   Interferes with functioning but not activities of daily living  []   Interferes with daily activies  []   Bedridden or disabling    Shortness of Breath:   []   None   [x]   Dyspneic on exertion   []   Dyspnea with normal activities  []   Dyspnea at rest    Breath Sounds: No increased work of breathing, Breath sounds clear to auscultation bilaterally, Good air exchange and No crackles    Edema: 2+ Location of edema: left leg and right leg    Tachycardia: No    Heart Palpitations: No      Chest Pain: No    Premedicated: No premeds ordered.   [] Tylenol 325 mg oral  [] Tylenol 650 mg oral    [] Benadryl 25 mg oral   [] Benadryl 50 mg oral  [] Other    Patient care handed off to Geraldo Vides    Electronically signed by Penelope Brody RN on 3/1/2019 at 9:51 AM

## 2019-03-07 ENCOUNTER — HOSPITAL ENCOUNTER (OUTPATIENT)
Dept: INTERVENTIONAL RADIOLOGY/VASCULAR | Age: 64
Discharge: HOME | End: 2019-03-07
Payer: MEDICARE

## 2019-03-07 VITALS
HEART RATE: 95 BPM | DIASTOLIC BLOOD PRESSURE: 74 MMHG | OXYGEN SATURATION: 96 % | SYSTOLIC BLOOD PRESSURE: 145 MMHG | RESPIRATION RATE: 16 BRPM | TEMPERATURE: 98.2 F

## 2019-03-07 DIAGNOSIS — C18.7 PRIMARY MALIGNANT NEOPLASM OF SIGMOID COLON (HCC): ICD-10-CM

## 2019-03-07 LAB
INR BLD: 1.38 (ref 0.86–1.14)
PLATELET # BLD: 163 K/UL (ref 135–450)
PROTHROMBIN TIME: 15.7 SEC (ref 9.8–13)

## 2019-03-07 PROCEDURE — 7100000010 HC PHASE II RECOVERY - FIRST 15 MIN

## 2019-03-07 PROCEDURE — 36415 COLL VENOUS BLD VENIPUNCTURE: CPT

## 2019-03-07 PROCEDURE — 85610 PROTHROMBIN TIME: CPT

## 2019-03-07 PROCEDURE — 2500000003 HC RX 250 WO HCPCS: Performed by: RADIOLOGY

## 2019-03-07 PROCEDURE — C1729 CATH, DRAINAGE: HCPCS

## 2019-03-07 PROCEDURE — 2580000003 HC RX 258: Performed by: RADIOLOGY

## 2019-03-07 PROCEDURE — 6360000002 HC RX W HCPCS: Performed by: RADIOLOGY

## 2019-03-07 PROCEDURE — 85049 AUTOMATED PLATELET COUNT: CPT

## 2019-03-07 PROCEDURE — 7100000011 HC PHASE II RECOVERY - ADDTL 15 MIN

## 2019-03-07 PROCEDURE — 50435 EXCHANGE NEPHROSTOMY CATH: CPT

## 2019-03-07 PROCEDURE — 6360000004 HC RX CONTRAST MEDICATION: Performed by: RADIOLOGY

## 2019-03-07 RX ORDER — SODIUM CHLORIDE 9 MG/ML
INJECTION, SOLUTION INTRAVENOUS CONTINUOUS
Status: DISCONTINUED | OUTPATIENT
Start: 2019-03-07 | End: 2019-03-08 | Stop reason: HOSPADM

## 2019-03-07 RX ORDER — FENTANYL CITRATE 50 UG/ML
INJECTION, SOLUTION INTRAMUSCULAR; INTRAVENOUS DAILY PRN
Status: COMPLETED | OUTPATIENT
Start: 2019-03-07 | End: 2019-03-07

## 2019-03-07 RX ORDER — MIDAZOLAM HYDROCHLORIDE 1 MG/ML
INJECTION INTRAMUSCULAR; INTRAVENOUS DAILY PRN
Status: COMPLETED | OUTPATIENT
Start: 2019-03-07 | End: 2019-03-07

## 2019-03-07 RX ORDER — CLINDAMYCIN PHOSPHATE 600 MG/50ML
600 INJECTION INTRAVENOUS
Status: COMPLETED | OUTPATIENT
Start: 2019-03-07 | End: 2019-03-07

## 2019-03-07 RX ORDER — TETRACYCLINE HYDROCHLORIDE 500 MG/1
500 CAPSULE ORAL 2 TIMES DAILY
COMMUNITY
End: 2019-06-25

## 2019-03-07 RX ADMIN — FENTANYL CITRATE 50 MCG: 50 INJECTION INTRAMUSCULAR; INTRAVENOUS at 09:02

## 2019-03-07 RX ADMIN — CLINDAMYCIN PHOSPHATE 600 MG: 600 INJECTION, SOLUTION INTRAVENOUS at 08:47

## 2019-03-07 RX ADMIN — SODIUM CHLORIDE: 9 INJECTION, SOLUTION INTRAVENOUS at 08:53

## 2019-03-07 RX ADMIN — MIDAZOLAM 1 MG: 1 INJECTION INTRAMUSCULAR; INTRAVENOUS at 09:02

## 2019-03-07 RX ADMIN — IOVERSOL 20 ML: 678 INJECTION INTRA-ARTERIAL; INTRAVENOUS at 09:25

## 2019-03-07 ASSESSMENT — PAIN SCALES - GENERAL
PAINLEVEL_OUTOF10: 0
PAINLEVEL_OUTOF10: 0

## 2019-03-13 ENCOUNTER — OFFICE VISIT (OUTPATIENT)
Dept: INTERNAL MEDICINE CLINIC | Age: 64
End: 2019-03-13
Payer: MEDICARE

## 2019-03-13 VITALS
BODY MASS INDEX: 28.82 KG/M2 | HEART RATE: 88 BPM | DIASTOLIC BLOOD PRESSURE: 80 MMHG | WEIGHT: 173 LBS | SYSTOLIC BLOOD PRESSURE: 160 MMHG | HEIGHT: 65 IN

## 2019-03-13 DIAGNOSIS — I77.1 RIGHT ILIAC ARTERY STENOSIS (HCC): ICD-10-CM

## 2019-03-13 DIAGNOSIS — C18.7 MALIGNANT NEOPLASM OF SIGMOID COLON (HCC): Primary | ICD-10-CM

## 2019-03-13 DIAGNOSIS — D64.9 ANEMIA, UNSPECIFIED TYPE: ICD-10-CM

## 2019-03-13 DIAGNOSIS — Z93.6 NEPHROSTOMY STATUS (HCC): ICD-10-CM

## 2019-03-13 DIAGNOSIS — S98.131A AMPUTATED TOE OF RIGHT FOOT (HCC): ICD-10-CM

## 2019-03-13 DIAGNOSIS — F17.200 TOBACCO DEPENDENCE: ICD-10-CM

## 2019-03-13 DIAGNOSIS — I73.9 RIGHT LEG CLAUDICATION (HCC): ICD-10-CM

## 2019-03-13 DIAGNOSIS — E44.1 MILD PROTEIN-CALORIE MALNUTRITION (HCC): ICD-10-CM

## 2019-03-13 DIAGNOSIS — Z93.3 COLOSTOMY STATUS (HCC): ICD-10-CM

## 2019-03-13 DIAGNOSIS — I89.0 LYMPHEDEMA OF BOTH LOWER EXTREMITIES: ICD-10-CM

## 2019-03-13 PROCEDURE — 99214 OFFICE O/P EST MOD 30 MIN: CPT | Performed by: FAMILY MEDICINE

## 2019-03-13 PROCEDURE — G8427 DOCREV CUR MEDS BY ELIG CLIN: HCPCS | Performed by: FAMILY MEDICINE

## 2019-03-13 PROCEDURE — G8482 FLU IMMUNIZE ORDER/ADMIN: HCPCS | Performed by: FAMILY MEDICINE

## 2019-03-13 PROCEDURE — 4004F PT TOBACCO SCREEN RCVD TLK: CPT | Performed by: FAMILY MEDICINE

## 2019-03-13 PROCEDURE — 3017F COLORECTAL CA SCREEN DOC REV: CPT | Performed by: FAMILY MEDICINE

## 2019-03-13 PROCEDURE — G8419 CALC BMI OUT NRM PARAM NOF/U: HCPCS | Performed by: FAMILY MEDICINE

## 2019-03-13 RX ORDER — ATORVASTATIN CALCIUM 20 MG/1
20 TABLET, FILM COATED ORAL DAILY
Qty: 30 TABLET | Refills: 5 | Status: SHIPPED | OUTPATIENT
Start: 2019-03-13

## 2019-03-28 RX ORDER — 0.9 % SODIUM CHLORIDE 0.9 %
250 INTRAVENOUS SOLUTION INTRAVENOUS ONCE
Status: CANCELLED | OUTPATIENT
Start: 2019-03-28 | End: 2019-03-28

## 2019-03-28 RX ORDER — SODIUM CHLORIDE 0.9 % (FLUSH) 0.9 %
10 SYRINGE (ML) INJECTION PRN
Status: CANCELLED | OUTPATIENT
Start: 2019-03-28

## 2019-03-29 ENCOUNTER — HOSPITAL ENCOUNTER (OUTPATIENT)
Dept: INFUSION THERAPY | Age: 64
Setting detail: INFUSION SERIES
Discharge: HOME | End: 2019-03-29
Payer: MEDICARE

## 2019-03-29 VITALS
HEART RATE: 88 BPM | RESPIRATION RATE: 17 BRPM | TEMPERATURE: 97.8 F | OXYGEN SATURATION: 97 % | SYSTOLIC BLOOD PRESSURE: 166 MMHG | DIASTOLIC BLOOD PRESSURE: 75 MMHG

## 2019-03-29 DIAGNOSIS — K62.89 PROCTODYNIA: ICD-10-CM

## 2019-03-29 DIAGNOSIS — G62.0 CHEMOTHERAPY-INDUCED NEUROPATHY (HCC): ICD-10-CM

## 2019-03-29 DIAGNOSIS — T45.1X5A CHEMOTHERAPY-INDUCED NEUROPATHY (HCC): ICD-10-CM

## 2019-03-29 LAB
ABO/RH: NORMAL
ANTIBODY SCREEN: NORMAL

## 2019-03-29 PROCEDURE — 86923 COMPATIBILITY TEST ELECTRIC: CPT

## 2019-03-29 PROCEDURE — 86900 BLOOD TYPING SEROLOGIC ABO: CPT

## 2019-03-29 PROCEDURE — 86850 RBC ANTIBODY SCREEN: CPT

## 2019-03-29 PROCEDURE — 96523 IRRIG DRUG DELIVERY DEVICE: CPT

## 2019-03-29 PROCEDURE — 86901 BLOOD TYPING SEROLOGIC RH(D): CPT

## 2019-03-29 RX ORDER — NORTRIPTYLINE HYDROCHLORIDE 75 MG/1
CAPSULE ORAL
Qty: 90 CAPSULE | Refills: 1 | Status: SHIPPED | OUTPATIENT
Start: 2019-03-29 | End: 2019-08-20 | Stop reason: SDUPTHER

## 2019-03-29 NOTE — PROGRESS NOTES
Unable to contact pt. Regarding her labwork for type and screen today. Phone lines not working.     Electronically signed by Mirtha Maharaj RN on 3/29/2019 at 2:19 PM

## 2019-03-29 NOTE — PROGRESS NOTES
Outpatient [address] Access    NAME:  Gaby Chaudhry OF BIRTH:  1955  MEDICAL RECORD NUMBER:  [id number]  DATE:  3/29/2019    Patient arrived to [facility]   [] per wheelchair   [x] ambulatory     Port Site Location:  right chest  Port Site:  Redness: No  Bruising: No   Edema: No  Pain: No     Port Site cleansed with:  Chloroprep Scrub for 30 seconds and air dried? Yes     Port Accessed with: [address] non coring needle using sterile technique. Blood return obtained: Yes  Flushed with 10 ml Normal Saline using push-pause method. Port flushes without resistance. Type and screen done for Monday 4/1/19 RBC transfusion. de-access of PAC  Tolerated both procedures well and small gauze applied at right chest with a small tegaderm drsg. Blood bank aware of 1 unit to be done this Monday.     Electronically signed by Ritesh Bazan RN on 3/29/2019 at 3:22 PM

## 2019-04-01 ENCOUNTER — HOSPITAL ENCOUNTER (OUTPATIENT)
Dept: INFUSION THERAPY | Age: 64
Setting detail: INFUSION SERIES
Discharge: HOME | End: 2019-04-01
Payer: MEDICARE

## 2019-04-01 VITALS
OXYGEN SATURATION: 100 % | DIASTOLIC BLOOD PRESSURE: 71 MMHG | TEMPERATURE: 97.6 F | HEIGHT: 65 IN | RESPIRATION RATE: 17 BRPM | WEIGHT: 165 LBS | SYSTOLIC BLOOD PRESSURE: 122 MMHG | BODY MASS INDEX: 27.49 KG/M2 | HEART RATE: 84 BPM

## 2019-04-01 PROCEDURE — 2580000003 HC RX 258

## 2019-04-01 PROCEDURE — 36430 TRANSFUSION BLD/BLD COMPNT: CPT

## 2019-04-01 PROCEDURE — 2580000003 HC RX 258: Performed by: INTERNAL MEDICINE

## 2019-04-01 PROCEDURE — P9016 RBC LEUKOCYTES REDUCED: HCPCS

## 2019-04-01 RX ORDER — SODIUM CHLORIDE 0.9 % (FLUSH) 0.9 %
10 SYRINGE (ML) INJECTION PRN
Status: DISCONTINUED | OUTPATIENT
Start: 2019-04-01 | End: 2019-04-02 | Stop reason: HOSPADM

## 2019-04-01 RX ORDER — 0.9 % SODIUM CHLORIDE 0.9 %
250 INTRAVENOUS SOLUTION INTRAVENOUS ONCE
Status: COMPLETED | OUTPATIENT
Start: 2019-04-01 | End: 2019-04-01

## 2019-04-01 RX ORDER — SODIUM CHLORIDE 9 MG/ML
INJECTION, SOLUTION INTRAVENOUS
Status: COMPLETED
Start: 2019-04-01 | End: 2019-04-01

## 2019-04-01 RX ADMIN — Medication 20 ML: at 09:21

## 2019-04-01 RX ADMIN — Medication 30 ML: at 12:08

## 2019-04-01 RX ADMIN — Medication 250 ML: at 09:24

## 2019-04-01 RX ADMIN — SODIUM CHLORIDE 250 ML: 9 INJECTION, SOLUTION INTRAVENOUS at 09:24

## 2019-04-01 ASSESSMENT — PAIN SCALES - GENERAL: PAINLEVEL_OUTOF10: 0

## 2019-04-01 NOTE — PROGRESS NOTES
Outpatient [address] [facility]    Blood Transfusion    NAME:  Woody Hurt OF BIRTH:  1955  MEDICAL RECORD NUMBER:  [id number]  DATE:  4/1/2019     Patient arrived to [facility]   [] per wheelchair   [x] ambulatory         Consent Obtained: Yes  Is this the patient's first Transfusion? No    Did the patient experience any adverse reactions to previous Transfusion? No  Patient Active Problem List   Diagnosis    Adjustment reaction with prolonged depressive reaction    Asthma    Radiation proctitis    Essential hypertension    Colostomy status ([facility]    Cervical cancer ([facility]    Proctodynia    Malignant neoplasm of sigmoid colon ([facility]    Tobacco dependence    Asymptomatic gallstones    Metastasis to liver (HCC)    Insomnia    Urinary incontinence    Vaginal discharge    History of radiation therapy    Metastatic adenocarcinoma to lung (HCC)    Chemotherapy-induced neuropathy (HCC)    Constipation due to pain medication    Rectovaginal fistula    Vesico-vaginal fistula    Cancer associated pain    Nephrostomy status (HCC)    Chronic kidney disease, stage III (moderate) (HCC)    Mild protein-calorie malnutrition (HCC)    Anemia, iron deficiency    Right leg claudication ([facility]    Right iliac artery stenosis (HCC)    History of hypertension resolved with weight loss    Hypokalemia    Amputated toe of right foot (HCC) 4th toe due to ischemia    Closed fracture of neck of left femur (HCC)    Closed displaced fracture of left femoral neck (HCC)    Tachycardia    Hip fracture, left, closed, with routine healing, subsequent encounter    Arthritis of left knee    Lymphedema of both lower extremities     Patient with Bone Marrow Suppression due to chemotherapy? Yes    Patient with history of GI bleeding? No  Patient with history of CHF? No  Patient with history of COPD?  No    Hemoglobin/Hematocrit:    Lab Results   Component Value Date    HGB 7.5 02/28/2019    HCT 24.1 02/28/2019       Special Transfusion Products Requested: No  Blood was:  []  Irradiated    [] Washed    [] Other    Is the patient experiencing any:  Fatigue: yes  []   None  []   Increase over baseline but not altering normal activities  [x]   Moderate of causing difficulty performing some activities  []   Severe or loss of ability to perform some activities  []   Bedridden or disabling    Dizziness or Lightheadedness: no  [x]   None  []   No Interference  []   Interferes with functioning but not activities of daily living  []   Interferes with daily activies  []   Bedridden or disabling  Shortness of Breath: yes  []   None   [x]   Dyspneic on exertion   []   Dyspnea with normal activities  []   Dyspnea at rest    Breath Sounds: No increased work of breathing, Breath sounds clear to auscultation bilaterally and Good air exchange    Edema: 4+ Location of edema: left leg and right leg    Tachycardia: No    Heart Palpitations: No      Chest Pain: No    Premedicated:  [] Tylenol 325 mg oral  [] Tylenol 650 mg oral    [] Benadryl 25 mg oral   [] Benadryl 50 mg oral  [] Other    Administered Blood via: [] Peripheral access    [] PICC access    [x] Port access    Numbers of units transfused 1 over 3 hours    Monitoring of vital signs and rate changes are documented in flow sheets. Response to treatment:  Well tolerated by patient.     Education:    Indicates understanding      Electronically signed by Radha Ortiz RN on 4/1/2019 at 9:28 AM

## 2019-04-10 ENCOUNTER — OFFICE VISIT (OUTPATIENT)
Dept: ORTHOPEDIC SURGERY | Age: 64
End: 2019-04-10
Payer: MEDICARE

## 2019-04-10 VITALS
SYSTOLIC BLOOD PRESSURE: 131 MMHG | RESPIRATION RATE: 16 BRPM | WEIGHT: 163 LBS | BODY MASS INDEX: 27.16 KG/M2 | DIASTOLIC BLOOD PRESSURE: 74 MMHG | HEIGHT: 65 IN | HEART RATE: 87 BPM

## 2019-04-10 DIAGNOSIS — S72.002A CLOSED FRACTURE OF NECK OF LEFT FEMUR, INITIAL ENCOUNTER (HCC): Primary | ICD-10-CM

## 2019-04-10 PROCEDURE — G8427 DOCREV CUR MEDS BY ELIG CLIN: HCPCS | Performed by: ORTHOPAEDIC SURGERY

## 2019-04-10 PROCEDURE — 4004F PT TOBACCO SCREEN RCVD TLK: CPT | Performed by: ORTHOPAEDIC SURGERY

## 2019-04-10 PROCEDURE — G8419 CALC BMI OUT NRM PARAM NOF/U: HCPCS | Performed by: ORTHOPAEDIC SURGERY

## 2019-04-10 PROCEDURE — 99213 OFFICE O/P EST LOW 20 MIN: CPT | Performed by: ORTHOPAEDIC SURGERY

## 2019-04-10 PROCEDURE — 3017F COLORECTAL CA SCREEN DOC REV: CPT | Performed by: ORTHOPAEDIC SURGERY

## 2019-04-10 RX ORDER — SULFAMETHOXAZOLE AND TRIMETHOPRIM 800; 160 MG/1; MG/1
1 TABLET ORAL 2 TIMES DAILY
Status: ON HOLD | COMMUNITY
Start: 2019-04-08 | End: 2019-10-10 | Stop reason: HOSPADM

## 2019-04-14 NOTE — PROGRESS NOTES
(peripheral vascular disease) ([facility]     Radiation proctitis 2011    radiation for cervical cancer    Stress incontinence     Wears glasses     reading       Past Surgical History:   Procedure Laterality Date    BREAST BIOPSY      benign     SECTION  1981    Breech    COLONOSCOPY      COLOSTOMY  fall     due to rectovaginal fistula     CYSTOSCOPY  EUS    LEG SURGERY  03/15/2017    2 stents in right leg and 1 stent in left leg    NEPHROSTOMY Bilateral 2019    10 FR - DR. Zelda Calloway    PARTIAL HIP ARTHROPLASTY Left     SD OFFICE/OUTPT VISIT,PROCEDURE ONLY Left 2018    LEFT HIP HEMIARTHROPLASTY performed by Kevin Joshua MD at [place] [address].  2014    adenocarcinoma    TOE AMPUTATION N/A 2017    4th toe right foot    TUNNELED VENOUS PORT PLACEMENT      VAGINA SURGERY  2016    Exam under anesthesia, vaginal washout    VASCULAR SURGERY      week of 3/13/17 by dr Blair Certain History     Socioeconomic History    Marital status: Single     Spouse name: Not on file    Number of children: 1    Years of education: Not on file    Highest education level: Not on file   Occupational History    Occupation: Unemployed     Comment:    Social Needs    Financial resource strain: Not on file    Food insecurity:     Worry: Not on file     Inability: Not on file    Transportation needs:     Medical: Not on file     Non-medical: Not on file   Tobacco Use    Smoking status: Current Every Day Smoker     Packs/day: 0.50     Years: 37.00     Pack years: 18.50     Types: Cigarettes    Smokeless tobacco: Never Used   Substance and Sexual Activity    Alcohol use: No     Alcohol/week: 0.0 oz    Drug use: No     Comment: hx of marijuana use about 30 years ago    Sexual activity: Not Currently   Lifestyle    Physical activity:     Days per week: Not on file     Minutes per session: Not on file    Stress: Not on file   Relationships    Social connections:     Talks on phone: Not on file     Gets together: Not on file     Attends Rastafarian service: Not on file     Active member of club or organization: Not on file     Attends meetings of clubs or organizations: Not on file     Relationship status: Not on file    Intimate partner violence:     Fear of current or ex partner: Not on file     Emotionally abused: Not on file     Physically abused: Not on file     Forced sexual activity: Not on file   Other Topics Concern    Not on file   Social History Narrative    She is now back in her own home and her daughter lives with her. Her sister and ROJAS are living with her mother. Family History   Problem Relation Age of Onset    Heart Failure Father     Other Father         DVT    Prostate Cancer Father     Other Mother         spinal myelitis    Anesth Problems Neg Hx        Current Outpatient Medications on File Prior to Visit   Medication Sig Dispense Refill    sulfamethoxazole-trimethoprim (BACTRIM DS;SEPTRA DS) 800-160 MG per tablet       nortriptyline (PAMELOR) 75 MG capsule TAKE 1 CAPSULE BY MOUTH NIGHTLY 90 capsule 1    atorvastatin (LIPITOR) 20 MG tablet Take 1 tablet by mouth daily 30 tablet 5    silver sulfADIAZINE (SILVADENE) 1 % cream Apply topically daily to any open skin ulcers 50 g 1    furosemide (LASIX) 20 MG tablet TAKE 1 TO 2 TABLETS DAILY AS NEEDED FOR LEG SWELLING. 180 tablet 0    mirtazapine (REMERON) 15 MG tablet TAKE 1 TABLET BY MOUTH NIGHTLY 30 tablet 5    methadone (DOLOPHINE) 10 MG tablet Take 10 mg by mouth every 8 hours as needed for Pain. Takes 2 3 X aday .  oxyCODONE (OXY-IR) 30 MG immediate release tablet Take 30 mg by mouth every 4 hours as needed for Pain. Katrin Gutrhie fentaNYL (DURAGESIC) 100 MCG/HR Place 1 patch onto the skin every 72 hours. Put on today .       aspirin EC 81 MG EC tablet Take 1 tablet by mouth daily 30 tablet 3    sennosides-docusate sodium (SENOKOT-S) 8.6-50 MG tablet Take 2 tablets by mouth daily (Patient taking differently: Take 2 tablets by mouth daily as needed ) 60 tablet 11    ondansetron (ZOFRAN) 8 MG tablet Take 1 tablet by mouth every 8 hours as needed for Nausea or Vomiting 30 tablet 5    tetracycline (ACHROMYCIN;SUMYCIN) 500 MG capsule Take 500 mg by mouth 2 times daily       No current facility-administered medications on file prior to visit. Pertinent items are noted in HPI  Review of systems reviewed from Patient History Form dated on 4/10/2019 and available in the patient's chart under the Media tab. No change noted. PHYSICAL EXAMINATION:  Ms. Topher Peter is a very pleasant 66 y.o.  female who presents today in no acute distress, awake, alert, and oriented. She is well dressed, nourished and  groomed. Patient with normal affect. Height is  5' 5\" (1.651 m), weight is 163 lb (73.9 kg), Body mass index is 27.12 kg/m². Resting respiratory rate is 16. The patient walks with mild limp using a cane. The incision is completely healed left hip. No signs of any erythema or drainage. She has no pain with the active or passive range of motion of the right hip. She has intact sensation, distally, and she is neurovascularly intact. Ankle reflex 1+ bilaterally. Good strength, and no instability both upper and lower extremities. She has bilateral lower extremity edema. IMAGING:  X-rays were taken in the office today, AP pelvis and 2 views of the left hip and femur, and showed the Press-Fit hemiarthroplasty in good position. No signs of any lucency. X-rays 3 views of the left knee taken 11/28/2018 in office were reviewed and showed no acute displaced fracture. These demonstrate mild degenerative changes with narrowing of the joint space in the medial joint space compartment, subchondral sclerosis, and marginal osteophytosis. IMPRESSION:    1-Left hip hemiarthroplasty and doing very well.   2-Left knee pain, new fall/contusion, arthritis-better    PLAN:  I discussed again with the patient that the left knee x-rays are negative for acute fracture. She can be weightbearing as tolerated. She can take NSAIDs as needed. I discussed with the patient that I think that she would really benefit from a course of physical therapy for further strengthening and stretching. An Rx for physical therapy was given to the patient. The patient will come back for a follow up in 6 months. At that time, we will take AP pelvis and 2 views of the affected hip. If her knee pain is not improved she may benefit from a cortisone injection. As this patient has demonstrated risk factors for osteoporosis, such as age greater than [de-identified] years and evidence of a fracture, I have referred the patient back to the primary care physician for evaluation for osteoporosis, including consideration for DEXA scanning, if this is felt to be clinically indicated. The patient is advised to contact the primary care physician to follow-up for further evaluation.        Bj Chung MD

## 2019-04-25 ENCOUNTER — HOSPITAL ENCOUNTER (OUTPATIENT)
Dept: INFUSION THERAPY | Age: 64
Setting detail: INFUSION SERIES
Discharge: HOME | End: 2019-04-25
Payer: MEDICARE

## 2019-04-25 LAB
ABO/RH: NORMAL
ANTIBODY SCREEN: NORMAL

## 2019-04-25 PROCEDURE — 86850 RBC ANTIBODY SCREEN: CPT

## 2019-04-25 PROCEDURE — 86900 BLOOD TYPING SEROLOGIC ABO: CPT

## 2019-04-25 PROCEDURE — 86901 BLOOD TYPING SEROLOGIC RH(D): CPT

## 2019-04-25 PROCEDURE — 86923 COMPATIBILITY TEST ELECTRIC: CPT

## 2019-04-25 PROCEDURE — 36415 COLL VENOUS BLD VENIPUNCTURE: CPT

## 2019-04-25 PROCEDURE — 36591 DRAW BLOOD OFF VENOUS DEVICE: CPT

## 2019-04-25 RX ORDER — SODIUM CHLORIDE 0.9 % (FLUSH) 0.9 %
10 SYRINGE (ML) INJECTION PRN
Status: CANCELLED | OUTPATIENT
Start: 2019-04-25

## 2019-04-25 RX ORDER — 0.9 % SODIUM CHLORIDE 0.9 %
250 INTRAVENOUS SOLUTION INTRAVENOUS ONCE
Status: CANCELLED | OUTPATIENT
Start: 2019-04-25 | End: 2019-04-25

## 2019-04-25 NOTE — PROGRESS NOTES
[address] [facility] Access for Labs    NAME:  Zena Figueroa OF BIRTH:  1955  MEDICAL RECORD NUMBER:  [id number]  DATE:  4/25/2019    Patient arrived to [facility]   [] per wheelchair   [x] ambulatory      Patient here from [facility] in [facility]. Being treated for metastatic colon cancer with liver and lung mets. HGB 8.0 today and orders given for 1 unit packed cells. Has a port. Port Site Location:  right chest    Port Site:  Redness: No  Bruising: No   Edema: No  Pain: No     Port Site cleansed with:  Chloroprep Scrub for 30 seconds and air dried? Yes    Port Accessed with: 20 Gauge 1 inch Power Port needle using sterile technique. Blood return obtained: Yes    Labs:  Blood wasted: 10 ml  Labs drawn using a Vacutainer: Yes  Flushed with 30 ml Normal Saline using push-pause method. Port flushes without resistance. Port Deaccessed:  Yes    Response to treatment:  Well tolerated by patient. Education:    Indicates understanding    Patient to return tomorrow for blood transfusion.     Electronically signed by Dinah Ball RN on 4/25/2019 at 3:37 PM

## 2019-04-26 ENCOUNTER — HOSPITAL ENCOUNTER (OUTPATIENT)
Dept: INFUSION THERAPY | Age: 64
Setting detail: INFUSION SERIES
Discharge: HOME | End: 2019-04-26
Payer: MEDICARE

## 2019-04-26 VITALS
SYSTOLIC BLOOD PRESSURE: 152 MMHG | DIASTOLIC BLOOD PRESSURE: 79 MMHG | OXYGEN SATURATION: 97 % | TEMPERATURE: 97.8 F | RESPIRATION RATE: 17 BRPM | HEART RATE: 78 BPM

## 2019-04-26 PROCEDURE — 36430 TRANSFUSION BLD/BLD COMPNT: CPT

## 2019-04-26 PROCEDURE — 2580000003 HC RX 258: Performed by: INTERNAL MEDICINE

## 2019-04-26 PROCEDURE — 2580000003 HC RX 258

## 2019-04-26 PROCEDURE — P9016 RBC LEUKOCYTES REDUCED: HCPCS

## 2019-04-26 RX ORDER — SODIUM CHLORIDE 0.9 % (FLUSH) 0.9 %
10 SYRINGE (ML) INJECTION PRN
Status: DISCONTINUED | OUTPATIENT
Start: 2019-04-26 | End: 2019-04-27 | Stop reason: HOSPADM

## 2019-04-26 RX ORDER — 0.9 % SODIUM CHLORIDE 0.9 %
250 INTRAVENOUS SOLUTION INTRAVENOUS ONCE
Status: DISCONTINUED | OUTPATIENT
Start: 2019-04-26 | End: 2019-04-27 | Stop reason: HOSPADM

## 2019-04-26 RX ORDER — SODIUM CHLORIDE 9 MG/ML
INJECTION, SOLUTION INTRAVENOUS
Status: COMPLETED
Start: 2019-04-26 | End: 2019-04-26

## 2019-04-26 RX ADMIN — SODIUM CHLORIDE: 9 INJECTION, SOLUTION INTRAVENOUS at 10:10

## 2019-04-26 RX ADMIN — Medication 20 ML: at 13:02

## 2019-04-26 RX ADMIN — Medication 20 ML: at 10:11

## 2019-04-26 ASSESSMENT — PAIN - FUNCTIONAL ASSESSMENT
PAIN_FUNCTIONAL_ASSESSMENT: PREVENTS OR INTERFERES SOME ACTIVE ACTIVITIES AND ADLS
PAIN_FUNCTIONAL_ASSESSMENT: PREVENTS OR INTERFERES SOME ACTIVE ACTIVITIES AND ADLS

## 2019-04-26 ASSESSMENT — PAIN SCALES - GENERAL
PAINLEVEL_OUTOF10: 2
PAINLEVEL_OUTOF10: 0
PAINLEVEL_OUTOF10: 2

## 2019-04-26 ASSESSMENT — PAIN DESCRIPTION - ORIENTATION
ORIENTATION: LEFT
ORIENTATION: LEFT

## 2019-04-26 ASSESSMENT — PAIN DESCRIPTION - PROGRESSION
CLINICAL_PROGRESSION: NOT CHANGED
CLINICAL_PROGRESSION: NOT CHANGED

## 2019-04-26 ASSESSMENT — PAIN DESCRIPTION - DESCRIPTORS
DESCRIPTORS: SORE
DESCRIPTORS: SORE

## 2019-04-26 ASSESSMENT — PAIN DESCRIPTION - PAIN TYPE
TYPE: CHRONIC PAIN
TYPE: CHRONIC PAIN

## 2019-04-26 ASSESSMENT — PAIN DESCRIPTION - LOCATION
LOCATION: KNEE
LOCATION: KNEE

## 2019-04-26 ASSESSMENT — PAIN DESCRIPTION - FREQUENCY
FREQUENCY: INTERMITTENT
FREQUENCY: INTERMITTENT

## 2019-04-26 ASSESSMENT — PAIN DESCRIPTION - ONSET
ONSET: GRADUAL
ONSET: GRADUAL

## 2019-04-26 NOTE — PROGRESS NOTES
Outpatient [facility] 1976    Blood Transfusion    NAME:  Hayden Orona OF BIRTH:  1955  MEDICAL RECORD NUMBER:  [id number]  DATE:  4/26/2019     Pt heavily sedated from taking antidepressants late at 1 am with her pain meds. Easily arouses to name,aware of procedure / blood transfusion , consent signed. Has some knee pain in left knee but is tolerable. No pre-meds given     Patient arrived to [place] [address]   [] per wheelchair   [x] ambulatory         Consent Obtained: Yes  Is this the patient's first Transfusion? No    Did the patient experience any adverse reactions to previous Transfusion?  No  Patient Active Problem List   Diagnosis    Adjustment reaction with prolonged depressive reaction    Asthma    Radiation proctitis    Essential hypertension    Colostomy status ([address]    Cervical cancer ([address]    Proctodynia    Malignant neoplasm of sigmoid colon ([address]    Tobacco dependence    Asymptomatic gallstones    Metastasis to liver (HCC)    Insomnia    Urinary incontinence    Vaginal discharge    History of radiation therapy    Metastatic adenocarcinoma to lung (HCC)    Chemotherapy-induced neuropathy (HCC)    Constipation due to pain medication    Rectovaginal fistula    Vesico-vaginal fistula    Cancer associated pain    Nephrostomy status (HCC)    Chronic kidney disease, stage III (moderate) (HCC)    Mild protein-calorie malnutrition (HCC)    Anemia, iron deficiency    Right leg claudication ([address]    Right iliac artery stenosis (HCC)    History of hypertension resolved with weight loss    Hypokalemia    Amputated toe of right foot (HCC) 4th toe due to ischemia    Closed fracture of neck of left femur (HCC)    Closed displaced fracture of left femoral neck (HCC)    Tachycardia    Hip fracture, left, closed, with routine healing, subsequent encounter    Arthritis of left knee    Lymphedema of both lower extremities Patient with Bone Marrow Suppression due to chemotherapy? Yes    Patient with history of GI bleeding? No.   Patient with history of CHF? No  Patient with history of COPD? No    Hemoglobin/Hematocrit:    Lab Results   Component Value Date    HGB 7.5 02/28/2019    HCT 24.1 02/28/2019     Special Transfusion Products Requested: No  Blood was:  []  Irradiated    [] Washed    [] Other    Is the patient experiencing any:  Fatigue:   []   None  []   Increase over baseline but not altering normal activities  [x]   Moderate of causing difficulty performing some activities  []   Severe or loss of ability to perform some activities  []   Bedridden or disabling    Dizziness or Lightheadedness: [x]   None  []   No Interference  []   Interferes with functioning but not activities of daily living  []   Interferes with daily activies  []   Bedridden or disabling    Shortness of Breath: [x]   None   []   Dyspneic on exertion   []   Dyspnea with normal activities  []   Dyspnea at rest    Breath Sounds: No increased work of breathing, Breath sounds clear to auscultation bilaterally and Good air exchange    Edema: none. Tachycardia: No    Heart Palpitations: No      Chest Pain: No    Administered Blood via: [] Peripheral access    [] PICC access    [x] Port access    Numbers of units transfused 1 over 2 hours. Monitoring of vital signs and rate changes are documented in flow sheets. Response to treatment:  Well tolerated by patient. Education:    Needs reinforcement      Follows up with her doctor in 2 weeks. Supervised walk to the [address] [facility] door, all saline infused after blood infused.   Electronically signed by Harley Holland RN on 4/26/2019 at 6:20 PM

## 2019-05-06 ENCOUNTER — HOSPITAL ENCOUNTER (OUTPATIENT)
Dept: INTERVENTIONAL RADIOLOGY/VASCULAR | Age: 64
Discharge: HOME | End: 2019-05-06
Payer: MEDICARE

## 2019-05-06 VITALS
BODY MASS INDEX: 25.33 KG/M2 | TEMPERATURE: 98.1 F | RESPIRATION RATE: 12 BRPM | SYSTOLIC BLOOD PRESSURE: 136 MMHG | DIASTOLIC BLOOD PRESSURE: 74 MMHG | HEART RATE: 80 BPM | WEIGHT: 152.01 LBS | HEIGHT: 65 IN | OXYGEN SATURATION: 98 %

## 2019-05-06 DIAGNOSIS — N13.39 OTHER HYDRONEPHROSIS: ICD-10-CM

## 2019-05-06 LAB
INR BLD: 1.48 (ref 0.86–1.14)
PLATELET # BLD: 128 K/UL (ref 135–450)
PROTHROMBIN TIME: 16.9 SEC (ref 9.8–13)

## 2019-05-06 PROCEDURE — 85610 PROTHROMBIN TIME: CPT

## 2019-05-06 PROCEDURE — 85049 AUTOMATED PLATELET COUNT: CPT

## 2019-05-06 PROCEDURE — 50435 EXCHANGE NEPHROSTOMY CATH: CPT

## 2019-05-06 PROCEDURE — 6360000002 HC RX W HCPCS: Performed by: RADIOLOGY

## 2019-05-06 PROCEDURE — 7100000010 HC PHASE II RECOVERY - FIRST 15 MIN

## 2019-05-06 PROCEDURE — 7100000011 HC PHASE II RECOVERY - ADDTL 15 MIN

## 2019-05-06 PROCEDURE — 2580000003 HC RX 258: Performed by: RADIOLOGY

## 2019-05-06 PROCEDURE — 99152 MOD SED SAME PHYS/QHP 5/>YRS: CPT

## 2019-05-06 PROCEDURE — C1769 GUIDE WIRE: HCPCS

## 2019-05-06 PROCEDURE — 36415 COLL VENOUS BLD VENIPUNCTURE: CPT

## 2019-05-06 PROCEDURE — 6360000004 HC RX CONTRAST MEDICATION: Performed by: INTERNAL MEDICINE

## 2019-05-06 RX ORDER — SODIUM CHLORIDE 9 MG/ML
INJECTION, SOLUTION INTRAVENOUS ONCE
Status: COMPLETED | OUTPATIENT
Start: 2019-05-06 | End: 2019-05-06

## 2019-05-06 RX ORDER — CIPROFLOXACIN 2 MG/ML
400 INJECTION, SOLUTION INTRAVENOUS
Status: COMPLETED | OUTPATIENT
Start: 2019-05-06 | End: 2019-05-06

## 2019-05-06 RX ORDER — MIDAZOLAM HYDROCHLORIDE 1 MG/ML
INJECTION INTRAMUSCULAR; INTRAVENOUS DAILY PRN
Status: COMPLETED | OUTPATIENT
Start: 2019-05-06 | End: 2019-05-06

## 2019-05-06 RX ORDER — IODIXANOL 320 MG/ML
100 INJECTION, SOLUTION INTRAVASCULAR
Status: COMPLETED | OUTPATIENT
Start: 2019-05-06 | End: 2019-05-06

## 2019-05-06 RX ORDER — FENTANYL CITRATE 50 UG/ML
INJECTION, SOLUTION INTRAMUSCULAR; INTRAVENOUS DAILY PRN
Status: COMPLETED | OUTPATIENT
Start: 2019-05-06 | End: 2019-05-06

## 2019-05-06 RX ADMIN — IODIXANOL 20 ML: 320 INJECTION, SOLUTION INTRAVASCULAR at 10:14

## 2019-05-06 RX ADMIN — FENTANYL CITRATE 25 MCG: 50 INJECTION INTRAMUSCULAR; INTRAVENOUS at 08:22

## 2019-05-06 RX ADMIN — MIDAZOLAM 0.5 MG: 1 INJECTION INTRAMUSCULAR; INTRAVENOUS at 08:16

## 2019-05-06 RX ADMIN — CIPROFLOXACIN 400 MG: 2 INJECTION, SOLUTION INTRAVENOUS at 07:35

## 2019-05-06 RX ADMIN — MIDAZOLAM 0.5 MG: 1 INJECTION INTRAMUSCULAR; INTRAVENOUS at 08:22

## 2019-05-06 RX ADMIN — SODIUM CHLORIDE: 9 INJECTION, SOLUTION INTRAVENOUS at 07:34

## 2019-05-06 RX ADMIN — FENTANYL CITRATE 25 MCG: 50 INJECTION INTRAMUSCULAR; INTRAVENOUS at 08:15

## 2019-05-06 ASSESSMENT — PAIN - FUNCTIONAL ASSESSMENT
PAIN_FUNCTIONAL_ASSESSMENT: PREVENTS OR INTERFERES SOME ACTIVE ACTIVITIES AND ADLS
PAIN_FUNCTIONAL_ASSESSMENT: 0-10

## 2019-05-06 ASSESSMENT — PAIN SCALES - GENERAL
PAINLEVEL_OUTOF10: 0
PAINLEVEL_OUTOF10: 0

## 2019-05-06 ASSESSMENT — PAIN DESCRIPTION - DESCRIPTORS: DESCRIPTORS: CRAMPING

## 2019-05-06 NOTE — PRE SEDATION
Sedation Pre-Procedure Note    Patient Name: Leander Gaffney   YOB: 1955  Room/Bed: Room/bed info not found  Medical Record Number: [id number]  Date: 2019   Time: 8:39 AM       Indication:  Nephrostomy tube change    Consent: I have discussed with the patient and/or the patient representative the indication, alternatives, and the possible risks and/or complications of the planned procedure and the anesthesia methods. The patient and/or patient representative appear to understand and agree to proceed. Vital Signs:   Vitals:    19 0831   BP:    Pulse: 93   Resp:    Temp:    SpO2:        Past Medical History:   has a past medical history of Acid reflux, Anemia, Anxiety, Arterial embolism and thrombosis of lower extremity (HCC), Arthritis, Asthma, Asymptomatic gallstones, Bacterial infection, Benign essential hypertension, Cataract, Cervical cancer ([address] Chemotherapy induced neutropenia ([address] Chronic insomnia, Closed fracture of right proximal humerus, Colon carcinoma metastatic to multiple sites [facility]), Colostomy status ([address] Depression, History of blood transfusion, Humerus fracture, Liver disease, Nephrostomy status ([address] Neuropathy, Pneumonia, Proctodynia, PVD (peripheral vascular disease) ([address] Radiation proctitis, Stress incontinence, and Wears glasses. Past Surgical History:   has a past surgical history that includes colostomy (fall ); Rectal surgery (2014);  section (); Colonoscopy; Cystocopy (EUS); Vagina surgery (2016); Leg Surgery (03/15/2017); vascular surgery; Breast biopsy (); Toe amputation (N/A, 2017); Tunneled venous port placement; pr office/outpt visit,procedure only (Left, 2018); Nephrostomy (Bilateral, 2019); Partial hip arthroplasty (Left); and Nephrostomy (Bilateral, 2019).     Medications:   Scheduled Meds:   Continuous Infusions:   PRN Meds:   Home Meds:   Prior to Admission medications    Medication Sig Start Date End Date Taking? Authorizing Provider   furosemide (LASIX) 20 MG tablet TAKE 1 TO 2 TABLETS DAILY AS NEEDED FOR LEG SWELLING. 4/17/19  Yes Rohan Siu MD   sulfamethoxazole-trimethoprim (BACTRIM DS;SEPTRA DS) 800-160 MG per tablet  4/8/19  Yes Historical Provider, MD   nortriptyline (PAMELOR) 75 MG capsule TAKE 1 CAPSULE BY MOUTH NIGHTLY 3/29/19  Yes Rohan Siu MD   atorvastatin (LIPITOR) 20 MG tablet Take 1 tablet by mouth daily 3/13/19  Yes Rohan Siu MD   silver sulfADIAZINE (SILVADENE) 1 % cream Apply topically daily to any open skin ulcers 3/13/19  Yes Rohan Siu MD   mirtazapine (REMERON) 15 MG tablet TAKE 1 TABLET BY MOUTH NIGHTLY 1/2/19  Yes Rohan Siu MD   methadone (DOLOPHINE) 10 MG tablet Take 10 mg by mouth every 8 hours as needed for Pain. Takes 2 3 X aday . Yes Historical Provider, MD   oxyCODONE (OXY-IR) 30 MG immediate release tablet Take 30 mg by mouth every 4 hours as needed for Pain. .   Yes Historical Provider, MD   fentaNYL (DURAGESIC) 100 MCG/HR Place 1 patch onto the skin every 72 hours. Put on today . Yes Historical Provider, MD   aspirin EC 81 MG EC tablet Take 1 tablet by mouth daily 3/31/17  Yes Catarino Chan MD   sennosides-docusate sodium (SENOKOT-S) 8.6-50 MG tablet Take 2 tablets by mouth daily  Patient taking differently: Take 2 tablets by mouth daily as needed  4/27/16  Yes Rohan Siu MD   tetracycline (ACHROMYCIN;SUMYCIN) 500 MG capsule Take 500 mg by mouth 2 times daily    Historical Provider, MD   ondansetron (ZOFRAN) 8 MG tablet Take 1 tablet by mouth every 8 hours as needed for Nausea or Vomiting 1/5/16   Paresh Lowry MD     Coumadin Use Last 7 Days:  no  Antiplatelet drug therapy use last 7 days: no  Other anticoagulant use last 7 days: no  Additional Medication Information:        Pre-Sedation Documentation and Exam:   I have reviewed the patient's history and review of systems.     Mallampati Airway Assessment:  Mallampati Class II - (soft palate, fauces & uvula are visible)    Prior History of Anesthesia Complications:   none    ASA Classification:  Class 2 - A normal healthy patient with mild systemic disease    Sedation/ Anesthesia Plan:   intravenous sedation    Medications Planned:   midazolam (Versed) intravenously and fentanyl intravenously    Patient is an appropriate candidate for plan of sedation: yes    Electronically signed by Britt Ford MD on 5/6/2019 at 8:39 AM

## 2019-05-06 NOTE — BRIEF OP NOTE
Brief Postoperative Note    Roxanne Dove  YOB: 1955  [id number]    Pre-operative Diagnosis: Nephrostomy tube change, bilateral     Post-operative Diagnosis: Same    Procedure: Same    Anesthesia: Moderate Sedation    Surgeons/Assistants: Phalen    Estimated Blood Loss: less than 50     Complications: None    Specimens: Was Not Obtained    Findings:   Successful bilateral nephrostomy tube changed.         Electronically signed by Jerilyn Hawkins MD on 5/6/2019 at 8:40 AM

## 2019-05-23 ENCOUNTER — HOSPITAL ENCOUNTER (OUTPATIENT)
Dept: INFUSION THERAPY | Age: 64
Setting detail: INFUSION SERIES
Discharge: HOME | End: 2019-05-23
Payer: MEDICARE

## 2019-05-23 LAB
ABO/RH: NORMAL
ANTIBODY SCREEN: NORMAL

## 2019-05-23 PROCEDURE — 86850 RBC ANTIBODY SCREEN: CPT

## 2019-05-23 PROCEDURE — 36415 COLL VENOUS BLD VENIPUNCTURE: CPT

## 2019-05-23 PROCEDURE — 86901 BLOOD TYPING SEROLOGIC RH(D): CPT

## 2019-05-23 PROCEDURE — 86923 COMPATIBILITY TEST ELECTRIC: CPT

## 2019-05-23 PROCEDURE — 86900 BLOOD TYPING SEROLOGIC ABO: CPT

## 2019-05-23 PROCEDURE — 36591 DRAW BLOOD OFF VENOUS DEVICE: CPT

## 2019-05-23 RX ORDER — SODIUM CHLORIDE 0.9 % (FLUSH) 0.9 %
10 SYRINGE (ML) INJECTION PRN
Status: CANCELLED | OUTPATIENT
Start: 2019-05-23

## 2019-05-23 RX ORDER — 0.9 % SODIUM CHLORIDE 0.9 %
250 INTRAVENOUS SOLUTION INTRAVENOUS ONCE
Status: CANCELLED | OUTPATIENT
Start: 2019-05-23 | End: 2019-05-23

## 2019-05-23 NOTE — PROGRESS NOTES
DREW To blood bank , spoke to Mac on evening shift. Informed him that T/CM specimens were sent to lab and that pt will be seen in am @ 1100am for 2 units of PC's .   Electronically signed by Onofre Harley RN on 5/23/2019 at 1515pm

## 2019-05-23 NOTE — PROGRESS NOTES
[address] Access for Labs    NAME:  Niurka Larson OF BIRTH:  1955  MEDICAL RECORD NUMBER:  [id number]  DATE:  5/23/2019    Patient arrived to [facility] [address]   [] per wheelchair   [x] ambulatory with cane, gait very slow   Color pale , pt in good spirits, talkativ e. Pt seen by her MARLI TRUONG MD this am , had lab work done via [employer] . Pt's Hemoglobin was 7.8. Pt needs 2 units of PC's in am, this amount of blood units clarified with Oncologist .   Color pale, pt wears a diaper or pad due to constant bloody oozing from perineal area. Changes pad or diaper daily. Port Site Location:  right chest    [employer]:  Redness: No  Bruising: No   Edema: No  Pain: No     Port Site cleansed with:  Chloroprep Scrub x 2  for 60 seconds and air dried x 3 mis ? Yes    [facility] with: 20 Gauge ,75 inch Power Port needle using sterile technique. Blood return obtained: Yes    Labs:  Blood wasted: 10 ml  Labs drawn using a Vacutainer/Syringe: Yes, obtained a T/CM specimen and a small purple tube. Flushed with 30 ml Normal Saline using push-pause method. Port flushes without resistance. Port Deaccessed---non coring needle intact --:  Yes--DSD to site     Response to treatment:  Well tolerated by patient. Education:  Re s/s of anemia and when to call MD   Verbalized understanding  Pt to return in am @ 1100am for 2 units of PC's .    Electronically signed by Harmony Le RN on 5/23/2019 at 1445pm

## 2019-05-24 ENCOUNTER — HOSPITAL ENCOUNTER (OUTPATIENT)
Dept: INFUSION THERAPY | Age: 64
Setting detail: INFUSION SERIES
Discharge: HOME | End: 2019-05-24
Payer: MEDICARE

## 2019-05-24 VITALS
RESPIRATION RATE: 16 BRPM | DIASTOLIC BLOOD PRESSURE: 56 MMHG | OXYGEN SATURATION: 98 % | HEART RATE: 72 BPM | TEMPERATURE: 97.4 F | SYSTOLIC BLOOD PRESSURE: 130 MMHG

## 2019-05-24 PROCEDURE — P9016 RBC LEUKOCYTES REDUCED: HCPCS

## 2019-05-24 PROCEDURE — 2580000003 HC RX 258: Performed by: INTERNAL MEDICINE

## 2019-05-24 PROCEDURE — 36430 TRANSFUSION BLD/BLD COMPNT: CPT

## 2019-05-24 PROCEDURE — 2580000003 HC RX 258

## 2019-05-24 RX ORDER — SODIUM CHLORIDE 9 MG/ML
INJECTION, SOLUTION INTRAVENOUS
Status: COMPLETED
Start: 2019-05-24 | End: 2019-05-25

## 2019-05-24 RX ORDER — 0.9 % SODIUM CHLORIDE 0.9 %
250 INTRAVENOUS SOLUTION INTRAVENOUS ONCE
Status: COMPLETED | OUTPATIENT
Start: 2019-05-24 | End: 2019-05-24

## 2019-05-24 RX ORDER — SODIUM CHLORIDE 0.9 % (FLUSH) 0.9 %
10 SYRINGE (ML) INJECTION PRN
Status: DISCONTINUED | OUTPATIENT
Start: 2019-05-24 | End: 2019-05-25 | Stop reason: HOSPADM

## 2019-05-24 RX ADMIN — SODIUM CHLORIDE 250 ML: 9 INJECTION, SOLUTION INTRAVENOUS at 12:26

## 2019-05-24 RX ADMIN — Medication 250 ML: at 12:26

## 2019-05-24 RX ADMIN — Medication 30 ML: at 17:28

## 2019-05-24 ASSESSMENT — PAIN SCALES - GENERAL: PAINLEVEL_OUTOF10: 0
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
## 2019-05-26 DIAGNOSIS — F32.9 REACTIVE DEPRESSION (SITUATIONAL): ICD-10-CM

## 2019-05-26 DIAGNOSIS — R63.4 ABNORMAL WEIGHT LOSS: ICD-10-CM

## 2019-05-28 RX ORDER — MIRTAZAPINE 15 MG/1
TABLET, FILM COATED ORAL
Qty: 30 TABLET | Refills: 5 | Status: SHIPPED | OUTPATIENT
Start: 2019-05-28 | End: 2019-10-25 | Stop reason: SDUPTHER

## 2019-06-24 ENCOUNTER — HOSPITAL ENCOUNTER (OUTPATIENT)
Dept: INFUSION THERAPY | Age: 64
Setting detail: INFUSION SERIES
Discharge: HOME | End: 2019-06-24
Payer: MEDICARE

## 2019-06-24 LAB
ABO/RH: NORMAL
ANTIBODY SCREEN: NORMAL

## 2019-06-24 PROCEDURE — 86850 RBC ANTIBODY SCREEN: CPT

## 2019-06-24 PROCEDURE — 86900 BLOOD TYPING SEROLOGIC ABO: CPT

## 2019-06-24 PROCEDURE — 86901 BLOOD TYPING SEROLOGIC RH(D): CPT

## 2019-06-24 PROCEDURE — 36591 DRAW BLOOD OFF VENOUS DEVICE: CPT

## 2019-06-24 PROCEDURE — 86923 COMPATIBILITY TEST ELECTRIC: CPT

## 2019-06-24 PROCEDURE — 36415 COLL VENOUS BLD VENIPUNCTURE: CPT

## 2019-06-24 RX ORDER — SODIUM CHLORIDE 0.9 % (FLUSH) 0.9 %
10 SYRINGE (ML) INJECTION PRN
Status: CANCELLED | OUTPATIENT
Start: 2019-06-24

## 2019-06-24 RX ORDER — 0.9 % SODIUM CHLORIDE 0.9 %
250 INTRAVENOUS SOLUTION INTRAVENOUS ONCE
Status: CANCELLED | OUTPATIENT
Start: 2019-06-24 | End: 2019-06-24

## 2019-06-25 ENCOUNTER — HOSPITAL ENCOUNTER (OUTPATIENT)
Dept: INFUSION THERAPY | Age: 64
Setting detail: INFUSION SERIES
Discharge: HOME | End: 2019-06-25
Payer: MEDICARE

## 2019-06-25 VITALS
SYSTOLIC BLOOD PRESSURE: 131 MMHG | HEART RATE: 70 BPM | DIASTOLIC BLOOD PRESSURE: 70 MMHG | TEMPERATURE: 98 F | OXYGEN SATURATION: 99 % | RESPIRATION RATE: 17 BRPM

## 2019-06-25 PROCEDURE — 36430 TRANSFUSION BLD/BLD COMPNT: CPT

## 2019-06-25 PROCEDURE — P9016 RBC LEUKOCYTES REDUCED: HCPCS

## 2019-06-25 PROCEDURE — 2580000003 HC RX 258: Performed by: INTERNAL MEDICINE

## 2019-06-25 RX ORDER — 0.9 % SODIUM CHLORIDE 0.9 %
250 INTRAVENOUS SOLUTION INTRAVENOUS ONCE
Status: COMPLETED | OUTPATIENT
Start: 2019-06-25 | End: 2019-06-25

## 2019-06-25 RX ORDER — SODIUM CHLORIDE 0.9 % (FLUSH) 0.9 %
10 SYRINGE (ML) INJECTION PRN
Status: DISCONTINUED | OUTPATIENT
Start: 2019-06-25 | End: 2019-06-26 | Stop reason: HOSPADM

## 2019-06-25 RX ORDER — SODIUM CHLORIDE 9 MG/ML
INJECTION, SOLUTION INTRAVENOUS
Status: DISPENSED
Start: 2019-06-25 | End: 2019-06-25

## 2019-06-25 RX ADMIN — SODIUM CHLORIDE 250 ML: 9 INJECTION, SOLUTION INTRAVENOUS at 11:52

## 2019-06-25 RX ADMIN — Medication 10 ML: at 11:30

## 2019-06-25 NOTE — PROGRESS NOTES
Outpatient [address] [facility]    Blood Transfusion    NAME:  Damir Scruggs OF BIRTH:  1955  MEDICAL RECORD NUMBER:  [id number]  DATE:  6/25/2019     Patient arrived to [facility] [address]   [] per wheelchair   [x] ambulatory   With cane     Color pale, pt only sl. Sob with walking to dept. Pt had T/CM done yesterday, here for one unit of PC's today. Has c/o of fatigue and weakness. Pt very pleasant and cooperative . Consent Obtained: Yes  Is this the patient's first Transfusion? No    Did the patient experience any adverse reactions to previous Transfusion?  No  Patient Active Problem List   Diagnosis    Adjustment reaction with prolonged depressive reaction    Asthma    Radiation proctitis    Essential hypertension    Colostomy status ([address]    Cervical cancer ([address]    Proctodynia    Malignant neoplasm of sigmoid colon ([address]    Tobacco dependence    Asymptomatic gallstones    Metastasis to liver (HCC)    Insomnia    Urinary incontinence    Vaginal discharge    History of radiation therapy    Metastatic adenocarcinoma to lung (HCC)    Chemotherapy-induced neuropathy (HCC)    Constipation due to pain medication    Rectovaginal fistula    Vesico-vaginal fistula    Cancer associated pain    Nephrostomy status (HCC)    Chronic kidney disease, stage III (moderate) (HCC)    Mild protein-calorie malnutrition (HCC)    Anemia, iron deficiency    Right leg claudication ([address]    Right iliac artery stenosis (HCC)    History of hypertension resolved with weight loss    Hypokalemia    Amputated toe of right foot (HCC) 4th toe due to ischemia    Closed fracture of neck of left femur (HCC)    Closed displaced fracture of left femoral neck (HCC)    Tachycardia    Hip fracture, left, closed, with routine healing, subsequent encounter    Arthritis of left knee    Lymphedema of both lower extremities     Patient with Bone Marrow Suppression due to chemotherapy? No , pt has had chemotx in past , is on none at present. Patient with history of GI bleeding? No  Patient with history of CHF? No  Patient with history of COPD? No    Hemoglobin/Hematocrit:    8.0 on Thursday 6/20/19 at [facility] office     Special Transfusion Products Requested: No  Blood was:  []  Irradiated    [] Washed    [] Other    Is the patient experiencing any:  Fatigue:  []   None  []   Increase over baseline but not altering normal activities  [x]   Moderate of causing difficulty performing some activities  []   Severe or loss of ability to perform some activities  []   Bedridden or disabling    Dizziness or Lightheadedness: [x]   None  []   No Interference  []   Interferes with functioning but not activities of daily living  []   Interferes with daily activies  []   Bedridden or disabling    Shortness of Breath:   []   None   [x]   Dyspneic on exertion with long walks                    []   Dyspnea with normal activities  []   Dyspnea at rest      Edema: 2+ Location of edema:lower legs to knees, bilaterally , pt has dx of lymphadema , pt has compression hose on. Tachycardia: No    Heart Palpitations: No      Chest Pain: No    Premedicated:no premeds ordered                      [] Tylenol 325 mg oral  [] Tylenol 650 mg oral    [] Benadryl 25 mg oral   [] Benadryl 50 mg oral  [] Other    Administered Blood via: [] Peripheral access    [] PICC access    [x] Port access    Numbers of units transfused 1 over  2 hours and 15mis. Monitoring of vital signs and rate changes are documented in flow sheets. Response to treatment:  Well tolerated by patient. Education:  Re possible s.e.  From Blood transfusion and when to call MD, instructed to rest today at home   Verbalized understanding    Pt has no return appnt here, has an appnt with Derrek Navarro at [facility] in approx 4 weeks,  Electronically signed by Omar Berrios RN on 6/25/2019 at 1600pm

## 2019-06-28 ENCOUNTER — HOSPITAL ENCOUNTER (OUTPATIENT)
Dept: INTERVENTIONAL RADIOLOGY/VASCULAR | Age: 64
Discharge: HOME | End: 2019-06-28
Payer: MEDICARE

## 2019-06-28 VITALS
HEIGHT: 65 IN | RESPIRATION RATE: 16 BRPM | WEIGHT: 139.77 LBS | DIASTOLIC BLOOD PRESSURE: 67 MMHG | OXYGEN SATURATION: 97 % | TEMPERATURE: 98 F | BODY MASS INDEX: 23.29 KG/M2 | SYSTOLIC BLOOD PRESSURE: 118 MMHG | HEART RATE: 73 BPM

## 2019-06-28 DIAGNOSIS — C18.7 PRIMARY MALIGNANT NEOPLASM OF SIGMOID COLON (HCC): ICD-10-CM

## 2019-06-28 LAB
INR BLD: 1.37 (ref 0.86–1.14)
PLATELET # BLD: 139 K/UL (ref 135–450)
PROTHROMBIN TIME: 15.6 SEC (ref 9.8–13)

## 2019-06-28 PROCEDURE — 36415 COLL VENOUS BLD VENIPUNCTURE: CPT

## 2019-06-28 PROCEDURE — 2580000003 HC RX 258: Performed by: RADIOLOGY

## 2019-06-28 PROCEDURE — 7100000010 HC PHASE II RECOVERY - FIRST 15 MIN

## 2019-06-28 PROCEDURE — 85610 PROTHROMBIN TIME: CPT

## 2019-06-28 PROCEDURE — 2500000003 HC RX 250 WO HCPCS: Performed by: RADIOLOGY

## 2019-06-28 PROCEDURE — 50435 EXCHANGE NEPHROSTOMY CATH: CPT

## 2019-06-28 PROCEDURE — 99153 MOD SED SAME PHYS/QHP EA: CPT

## 2019-06-28 PROCEDURE — 2709999900 IR GUIDED NEPHROSTOMY CATH EXCHANGE

## 2019-06-28 PROCEDURE — 7100000011 HC PHASE II RECOVERY - ADDTL 15 MIN

## 2019-06-28 PROCEDURE — 6360000004 HC RX CONTRAST MEDICATION: Performed by: INTERNAL MEDICINE

## 2019-06-28 PROCEDURE — 85049 AUTOMATED PLATELET COUNT: CPT

## 2019-06-28 PROCEDURE — 99152 MOD SED SAME PHYS/QHP 5/>YRS: CPT

## 2019-06-28 PROCEDURE — 6360000002 HC RX W HCPCS: Performed by: RADIOLOGY

## 2019-06-28 RX ORDER — SODIUM CHLORIDE 9 MG/ML
INJECTION, SOLUTION INTRAVENOUS CONTINUOUS
Status: DISCONTINUED | OUTPATIENT
Start: 2019-06-28 | End: 2019-06-29 | Stop reason: HOSPADM

## 2019-06-28 RX ORDER — FENTANYL CITRATE 50 UG/ML
INJECTION, SOLUTION INTRAMUSCULAR; INTRAVENOUS DAILY PRN
Status: COMPLETED | OUTPATIENT
Start: 2019-06-28 | End: 2019-06-28

## 2019-06-28 RX ORDER — SODIUM CHLORIDE 0.9 % (FLUSH) 0.9 %
10 SYRINGE (ML) INJECTION PRN
Status: DISCONTINUED | OUTPATIENT
Start: 2019-06-28 | End: 2019-06-29 | Stop reason: HOSPADM

## 2019-06-28 RX ORDER — CLINDAMYCIN PHOSPHATE 900 MG/50ML
900 INJECTION INTRAVENOUS
Status: COMPLETED | OUTPATIENT
Start: 2019-06-28 | End: 2019-06-28

## 2019-06-28 RX ORDER — MIDAZOLAM HYDROCHLORIDE 1 MG/ML
INJECTION INTRAMUSCULAR; INTRAVENOUS DAILY PRN
Status: COMPLETED | OUTPATIENT
Start: 2019-06-28 | End: 2019-06-28

## 2019-06-28 RX ADMIN — MIDAZOLAM 1 MG: 1 INJECTION INTRAMUSCULAR; INTRAVENOUS at 11:08

## 2019-06-28 RX ADMIN — CLINDAMYCIN PHOSPHATE 900 MG: 18 INJECTION, SOLUTION INTRAMUSCULAR; INTRAVENOUS at 10:52

## 2019-06-28 RX ADMIN — SODIUM CHLORIDE: 9 INJECTION, SOLUTION INTRAVENOUS at 09:34

## 2019-06-28 RX ADMIN — IOHEXOL 11 ML: 240 INJECTION, SOLUTION INTRATHECAL; INTRAVASCULAR; INTRAVENOUS; ORAL at 11:38

## 2019-06-28 RX ADMIN — FENTANYL CITRATE 50 MCG: 50 INJECTION INTRAMUSCULAR; INTRAVENOUS at 11:16

## 2019-06-28 RX ADMIN — FENTANYL CITRATE 50 MCG: 50 INJECTION INTRAMUSCULAR; INTRAVENOUS at 11:08

## 2019-06-28 ASSESSMENT — PAIN - FUNCTIONAL ASSESSMENT: PAIN_FUNCTIONAL_ASSESSMENT: 0-10

## 2019-06-28 ASSESSMENT — PAIN SCALES - GENERAL
PAINLEVEL_OUTOF10: 0
PAINLEVEL_OUTOF10: 0

## 2019-06-28 NOTE — PROGRESS NOTES
Alert and oriented. No c/o. Vss. Nephrostomy insertion sites are clean dry intact. Bags draining clear yellow urine. Patient tolerated sitting up and po fluids well. Family at bedside. Verbalizes understanding of discharge instructions. IV stopped and port infused with 20 cc saline flush easily, without difficulty. Port site appears clean dry intact without swelling or bruising.

## 2019-06-28 NOTE — BRIEF OP NOTE
Brief Postoperative Note  ______________________________________________________________    Patient: Collin Vanegas  YOB: 1955  MRN: [id number]  Date of Procedure: 6/28/2019    Pre-Op Diagnosis: History of cervical cancer with long term percutaneous nephrostomy tubes    Post-Op Diagnosis: Same       Bilateral 10 Fr percutaneous nephrostomy tube replacement    Anesthesia: Moderate sedation    Latrell Gsos DO     Estimated Blood Loss (mL): less than 50 mL     Complications: None      Drains:   Nephrostomy 1 Left (Active)       Nephrostomy 2 Right (Active)       Colostomy LLQ (Active)       Colostomy LLQ (Active)       Findings:     Bilateral 10 Fr percutaneous nephrostomy tube replacement    Behzad Rajput MD  Date: 6/28/2019  Time: 11:34 AM

## 2019-06-28 NOTE — PRE SEDATION
Sedation Pre-Procedure Note    Patient Name: Chaparro Nevarez   YOB: 1955  Room/Bed: Room/bed info not found  Medical Record Number: [id number]  Date: 2019   Time: 9:58 AM       Indication:  67 yo female presents for routine exchange of bilateral percutaneous nephrostomy catheters. Consent: I have discussed with the patient and/or the patient representative the indication, alternatives, and the possible risks and/or complications of the planned procedure and the anesthesia methods. The patient and/or patient representative appear to understand and agree to proceed. Vital Signs:   Vitals:    19 0916   BP: 127/69   Pulse: 86   Resp: 14   Temp: 98.4 °F (36.9 °C)   SpO2: 96%       Past Medical History:   has a past medical history of Acid reflux, Anemia, Anxiety, Arterial embolism and thrombosis of lower extremity (HCC), Arthritis, Asthma, Asymptomatic gallstones, Bacterial infection, Benign essential hypertension, Cataract, Cervical cancer ([address] Chemotherapy induced neutropenia ([address] Chronic insomnia, Closed fracture of right proximal humerus, Colon carcinoma metastatic to multiple sites [facility]), Colostomy status ([address] Depression, History of blood transfusion, Humerus fracture, Liver disease, Nephrostomy status ([address] Neuropathy, Pneumonia, Proctodynia, PVD (peripheral vascular disease) ([address] Radiation proctitis, Stress incontinence, and Wears glasses. Past Surgical History:   has a past surgical history that includes colostomy (fall ); Rectal surgery (2014);  section (); Colonoscopy; Cystocopy (EUS); Vagina surgery (2016); Leg Surgery (03/15/2017); Breast biopsy (); Toe amputation (N/A, 2017); Tunneled venous port placement; pr office/outpt visit,procedure only (Left, 2018); Nephrostomy (Bilateral, 2019); Partial hip arthroplasty (Left); Nephrostomy (Bilateral, 2019); and vascular surgery.     Medications:   Scheduled Meds:   

## 2019-07-18 ENCOUNTER — HOSPITAL ENCOUNTER (OUTPATIENT)
Dept: INFUSION THERAPY | Age: 64
Setting detail: INFUSION SERIES
Discharge: HOME | End: 2019-07-18
Payer: MEDICARE

## 2019-07-18 LAB
ABO/RH: NORMAL
ANTIBODY SCREEN: NORMAL

## 2019-07-18 PROCEDURE — 86900 BLOOD TYPING SEROLOGIC ABO: CPT

## 2019-07-18 PROCEDURE — 36591 DRAW BLOOD OFF VENOUS DEVICE: CPT

## 2019-07-18 PROCEDURE — 86850 RBC ANTIBODY SCREEN: CPT

## 2019-07-18 PROCEDURE — 86901 BLOOD TYPING SEROLOGIC RH(D): CPT

## 2019-07-18 PROCEDURE — 36415 COLL VENOUS BLD VENIPUNCTURE: CPT

## 2019-07-18 PROCEDURE — 86923 COMPATIBILITY TEST ELECTRIC: CPT

## 2019-07-18 RX ORDER — 0.9 % SODIUM CHLORIDE 0.9 %
250 INTRAVENOUS SOLUTION INTRAVENOUS ONCE
Status: CANCELLED | OUTPATIENT
Start: 2019-07-18 | End: 2019-07-18

## 2019-07-18 RX ORDER — SODIUM CHLORIDE 0.9 % (FLUSH) 0.9 %
10 SYRINGE (ML) INJECTION PRN
Status: CANCELLED | OUTPATIENT
Start: 2019-07-18

## 2019-07-18 NOTE — PROGRESS NOTES
[facility] Access for Labs    NAME:  Ever Art OF BIRTH:  1955  MEDICAL RECORD NUMBER:  [id number]  DATE:  7/18/2019    Patient arrived to [facility] [address]   [] per wheelchair   [x] ambulatory walking with cane    Patient with history of metastatic colon cancer with liver and lung mets. She is bleeding from vesicovaginal fistula for several weeks now. Needs 2 units pc and will be transfused tomorrow    Port Site Location:  right chest    Port Site:  Redness: No  Bruising: No   Edema: No  Pain: No     Port Site cleansed with:  Chloroprep Scrub for 30 seconds and air dried? Yes    Port Accessed with: 20 Gauge 1 inch Power Port needle using sterile technique. Blood return obtained: Yes    Labs:  Blood wasted: 10 ml  Labs drawn using a Vacutainer/Syringe: Yes  Flushed with 30 ml Normal Saline using push-pause method. Port flushes without resistance. Port Deaccessed:  Yes    Response to treatment:  Well tolerated by patient.     Education:    Indicates understanding    Patient will return tomorrow for transfusion    Electronically signed by Korin Stanton RN on 7/18/2019 at 11:29 AM

## 2019-07-19 ENCOUNTER — HOSPITAL ENCOUNTER (OUTPATIENT)
Dept: INFUSION THERAPY | Age: 64
Setting detail: INFUSION SERIES
Discharge: HOME | End: 2019-07-19
Payer: MEDICARE

## 2019-07-19 VITALS
HEART RATE: 81 BPM | OXYGEN SATURATION: 100 % | SYSTOLIC BLOOD PRESSURE: 122 MMHG | TEMPERATURE: 97.7 F | RESPIRATION RATE: 16 BRPM | DIASTOLIC BLOOD PRESSURE: 72 MMHG

## 2019-07-19 PROCEDURE — P9016 RBC LEUKOCYTES REDUCED: HCPCS

## 2019-07-19 PROCEDURE — 36430 TRANSFUSION BLD/BLD COMPNT: CPT

## 2019-07-19 RX ORDER — SODIUM CHLORIDE 9 MG/ML
INJECTION, SOLUTION INTRAVENOUS
Status: DISPENSED
Start: 2019-07-19 | End: 2019-07-19

## 2019-07-19 RX ORDER — 0.9 % SODIUM CHLORIDE 0.9 %
250 INTRAVENOUS SOLUTION INTRAVENOUS ONCE
Status: DISCONTINUED | OUTPATIENT
Start: 2019-07-19 | End: 2019-07-20 | Stop reason: HOSPADM

## 2019-07-19 RX ORDER — SODIUM CHLORIDE 0.9 % (FLUSH) 0.9 %
10 SYRINGE (ML) INJECTION PRN
Status: DISCONTINUED | OUTPATIENT
Start: 2019-07-19 | End: 2019-07-20 | Stop reason: HOSPADM

## 2019-08-19 ENCOUNTER — HOSPITAL ENCOUNTER (OUTPATIENT)
Dept: INFUSION THERAPY | Age: 64
Setting detail: INFUSION SERIES
Discharge: HOME | End: 2019-08-19
Payer: MEDICARE

## 2019-08-19 VITALS
HEART RATE: 73 BPM | DIASTOLIC BLOOD PRESSURE: 71 MMHG | TEMPERATURE: 97.9 F | SYSTOLIC BLOOD PRESSURE: 150 MMHG | OXYGEN SATURATION: 96 % | RESPIRATION RATE: 16 BRPM

## 2019-08-19 LAB
ABO/RH: NORMAL
ANTIBODY SCREEN: NORMAL
BLOOD BANK DISPENSE STATUS: NORMAL
BLOOD BANK PRODUCT CODE: NORMAL
BPU ID: NORMAL
DESCRIPTION BLOOD BANK: NORMAL

## 2019-08-19 PROCEDURE — 2580000003 HC RX 258

## 2019-08-19 PROCEDURE — P9016 RBC LEUKOCYTES REDUCED: HCPCS

## 2019-08-19 PROCEDURE — 86850 RBC ANTIBODY SCREEN: CPT

## 2019-08-19 PROCEDURE — 86900 BLOOD TYPING SEROLOGIC ABO: CPT

## 2019-08-19 PROCEDURE — 86923 COMPATIBILITY TEST ELECTRIC: CPT

## 2019-08-19 PROCEDURE — 36430 TRANSFUSION BLD/BLD COMPNT: CPT

## 2019-08-19 PROCEDURE — 86901 BLOOD TYPING SEROLOGIC RH(D): CPT

## 2019-08-19 PROCEDURE — 2580000003 HC RX 258: Performed by: INTERNAL MEDICINE

## 2019-08-19 PROCEDURE — 36415 COLL VENOUS BLD VENIPUNCTURE: CPT

## 2019-08-19 RX ORDER — SODIUM CHLORIDE 9 MG/ML
INJECTION, SOLUTION INTRAVENOUS
Status: COMPLETED
Start: 2019-08-19 | End: 2019-08-19

## 2019-08-19 RX ORDER — SODIUM CHLORIDE 0.9 % (FLUSH) 0.9 %
10 SYRINGE (ML) INJECTION PRN
Status: DISCONTINUED | OUTPATIENT
Start: 2019-08-19 | End: 2019-08-20 | Stop reason: HOSPADM

## 2019-08-19 RX ORDER — 0.9 % SODIUM CHLORIDE 0.9 %
250 INTRAVENOUS SOLUTION INTRAVENOUS ONCE
Status: COMPLETED | OUTPATIENT
Start: 2019-08-19 | End: 2019-08-19

## 2019-08-19 RX ADMIN — SODIUM CHLORIDE 250 ML: 9 INJECTION, SOLUTION INTRAVENOUS at 12:45

## 2019-08-19 RX ADMIN — Medication 20 ML: at 15:38

## 2019-08-19 RX ADMIN — Medication 250 ML: at 12:45

## 2019-08-19 RX ADMIN — Medication 20 ML: at 11:35

## 2019-08-19 NOTE — PROGRESS NOTES
[address] [facility] Access for Labs    NAME:  Sullivan Goodpasture OF BIRTH:  1955  MEDICAL RECORD NUMBER:  [id number]  DATE:  8/19/2019    Patient arrived to [place] [address]   [] per wheelchair   [x] ambulatory with cane     Port Site Location:  right chest    Port Site:  Redness: No  Bruising: No   Edema: No  Pain: No     Port Site cleansed with:  Chloroprep Scrub for 30 seconds and air dried? Yes    Port Accessed with: 20 Gauge 1 inch Power Port needle using sterile technique. Blood return obtained: Yes    Labs:  Blood wasted: 15 ml  Labs drawn using a Vacutainer: Yes obtained Type and screen and extra lavender to verify HGB  Flushed with 20 ml Normal Saline using push-pause method. Port flushes without resistance. Port Deaccessed:  No: scheduled for 1 unit of PRBC    Response to treatment:  Well tolerated by patient. Education:    Verbalized understanding    Electronically signed by Coleen Nelson RN on 8/19/2019 at 12:07 PM            Outpatient [id number] [facility]    Blood Transfusion    NAME:  Sullivan Goodpasture OF BIRTH:  1955  MEDICAL RECORD NUMBER:  [id number]  DATE:  8/19/2019     Patient arrived to [place] [address]   [] per wheelchair   [x] ambulatory with cane    HGB per [facility] on 8/15/19= 7.0. Scheduled for T&S today followed by transfusion of 1 unit PRBC         Consent Obtained: Yes  Is this the patient's first Transfusion? No    Did the patient experience any adverse reactions to previous Transfusion?  No  Patient Active Problem List   Diagnosis    Adjustment reaction with prolonged depressive reaction    Asthma    Radiation proctitis    Essential hypertension    Colostomy status ([facility]    Cervical cancer ([facility]    Proctodynia    Malignant neoplasm of sigmoid colon ([facility]    Tobacco dependence    Asymptomatic gallstones    Metastasis to liver (HCC)    Insomnia    Urinary incontinence    Vaginal discharge    History of radiation therapy    Metastatic adenocarcinoma to lung (HCC)    Chemotherapy-induced neuropathy (HCC)    Constipation due to pain medication    Rectovaginal fistula    Vesico-vaginal fistula    Cancer associated pain    Nephrostomy status (HCC)    Chronic kidney disease, stage III (moderate) (HCC)    Mild protein-calorie malnutrition (HCC)    Anemia, iron deficiency    Right leg claudication ([address]    Right iliac artery stenosis (HCC)    History of hypertension resolved with weight loss    Hypokalemia    Amputated toe of right foot (HCC) 4th toe due to ischemia    Closed fracture of neck of left femur (HCC)    Closed displaced fracture of left femoral neck (HCC)    Tachycardia    Hip fracture, left, closed, with routine healing, subsequent encounter    Arthritis of left knee    Lymphedema of both lower extremities     Patient with Bone Marrow Suppression due to chemotherapy? Yes, also active bleeding secondary to vesicovaginal fistula. Patient with history of GI bleeding? No  Patient with history of CHF? No  Patient with history of COPD?  No    Hemoglobin/Hematocrit:    Lab Results   Component Value Date    HGB 7.5 02/28/2019    HCT 24.1 02/28/2019       Special Transfusion Products Requested: No  Blood was:  []  Irradiated    [] Washed    [] Other    Is the patient experiencing any:  Fatigue:  Falls asleep easily in chair, states does not sleep at night, usually sleeps during the day  []   None  [x]   Increase over baseline but not altering normal activities  []   Moderate of causing difficulty performing some activities  []   Severe or loss of ability to perform some activities  []   Bedridden or disabling    Dizziness or Lightheadedness:   [x]   None  []   No Interference  []   Interferes with functioning but not activities of daily living  []   Interferes with daily activies  []   Bedridden or disabling    Shortness of Breath:   []   None   [x]   Dyspneic on exertion   []   Dyspnea with normal activities  []   Dyspnea at rest    Breath Sounds: No increased work of breathing, Breath sounds clear to auscultation bilaterally and Good air exchange    Edema: none Location of edema: nothing    Tachycardia: No    Heart Palpitations: No      Chest Pain: No    Premedicated: None  [] Tylenol 325 mg oral  [] Tylenol 650 mg oral    [] Benadryl 25 mg oral   [] Benadryl 50 mg oral  [] Other    Administered Blood via: [] Peripheral access    [] PICC access    [x] Port access    Numbers of units transfused 1 over 2 hours    Monitoring of vital signs and rate changes are documented in flow sheets. Response to treatment:  Well tolerated by patient.     Education:    Verbalized understanding      Electronically signed by Em Nichols RN on 8/19/2019 at 12:07 PM

## 2019-08-20 DIAGNOSIS — G62.0 CHEMOTHERAPY-INDUCED NEUROPATHY (HCC): ICD-10-CM

## 2019-08-20 DIAGNOSIS — T45.1X5A CHEMOTHERAPY-INDUCED NEUROPATHY (HCC): ICD-10-CM

## 2019-08-20 DIAGNOSIS — K62.89 PROCTODYNIA: ICD-10-CM

## 2019-08-21 RX ORDER — NORTRIPTYLINE HYDROCHLORIDE 75 MG/1
CAPSULE ORAL
Qty: 90 CAPSULE | Refills: 1 | Status: SHIPPED | OUTPATIENT
Start: 2019-08-21

## 2019-08-29 ENCOUNTER — HOSPITAL ENCOUNTER (OUTPATIENT)
Dept: INTERVENTIONAL RADIOLOGY/VASCULAR | Age: 64
Discharge: HOME | End: 2019-08-29
Payer: MEDICARE

## 2019-08-29 VITALS
SYSTOLIC BLOOD PRESSURE: 104 MMHG | RESPIRATION RATE: 16 BRPM | BODY MASS INDEX: 21.67 KG/M2 | TEMPERATURE: 97.8 F | HEIGHT: 65 IN | OXYGEN SATURATION: 100 % | DIASTOLIC BLOOD PRESSURE: 56 MMHG | HEART RATE: 73 BPM | WEIGHT: 130.07 LBS

## 2019-08-29 DIAGNOSIS — C18.7 PRIMARY MALIGNANT NEOPLASM OF SIGMOID COLON (HCC): ICD-10-CM

## 2019-08-29 LAB
INR BLD: 1.42 (ref 0.86–1.14)
PLATELET # BLD: 130 K/UL (ref 135–450)
PROTHROMBIN TIME: 16.2 SEC (ref 9.8–13)

## 2019-08-29 PROCEDURE — 6360000004 HC RX CONTRAST MEDICATION: Performed by: INTERNAL MEDICINE

## 2019-08-29 PROCEDURE — 36415 COLL VENOUS BLD VENIPUNCTURE: CPT

## 2019-08-29 PROCEDURE — 2500000003 HC RX 250 WO HCPCS: Performed by: RADIOLOGY

## 2019-08-29 PROCEDURE — 2580000003 HC RX 258: Performed by: RADIOLOGY

## 2019-08-29 PROCEDURE — C1729 CATH, DRAINAGE: HCPCS

## 2019-08-29 PROCEDURE — 6360000002 HC RX W HCPCS: Performed by: RADIOLOGY

## 2019-08-29 PROCEDURE — 85610 PROTHROMBIN TIME: CPT

## 2019-08-29 PROCEDURE — 50435 EXCHANGE NEPHROSTOMY CATH: CPT

## 2019-08-29 PROCEDURE — 99152 MOD SED SAME PHYS/QHP 5/>YRS: CPT

## 2019-08-29 PROCEDURE — 7100000010 HC PHASE II RECOVERY - FIRST 15 MIN

## 2019-08-29 PROCEDURE — 85049 AUTOMATED PLATELET COUNT: CPT

## 2019-08-29 PROCEDURE — 7100000011 HC PHASE II RECOVERY - ADDTL 15 MIN

## 2019-08-29 RX ORDER — MIDAZOLAM HYDROCHLORIDE 1 MG/ML
INJECTION INTRAMUSCULAR; INTRAVENOUS DAILY PRN
Status: COMPLETED | OUTPATIENT
Start: 2019-08-29 | End: 2019-08-29

## 2019-08-29 RX ORDER — SODIUM CHLORIDE 9 MG/ML
INJECTION, SOLUTION INTRAVENOUS ONCE
Status: COMPLETED | OUTPATIENT
Start: 2019-08-29 | End: 2019-08-29

## 2019-08-29 RX ORDER — ONDANSETRON 2 MG/ML
4 INJECTION INTRAMUSCULAR; INTRAVENOUS EVERY 8 HOURS PRN
Status: DISCONTINUED | OUTPATIENT
Start: 2019-08-29 | End: 2019-08-30 | Stop reason: HOSPADM

## 2019-08-29 RX ORDER — CLINDAMYCIN PHOSPHATE 900 MG/50ML
900 INJECTION INTRAVENOUS
Status: COMPLETED | OUTPATIENT
Start: 2019-08-29 | End: 2019-08-29

## 2019-08-29 RX ORDER — FENTANYL CITRATE 50 UG/ML
INJECTION, SOLUTION INTRAMUSCULAR; INTRAVENOUS DAILY PRN
Status: COMPLETED | OUTPATIENT
Start: 2019-08-29 | End: 2019-08-29

## 2019-08-29 RX ADMIN — CLINDAMYCIN PHOSPHATE 900 MG: 900 INJECTION, SOLUTION INTRAVENOUS at 08:27

## 2019-08-29 RX ADMIN — MIDAZOLAM 1 MG: 1 INJECTION INTRAMUSCULAR; INTRAVENOUS at 09:02

## 2019-08-29 RX ADMIN — IOPAMIDOL 5 ML: 612 INJECTION, SOLUTION INTRAVENOUS at 09:10

## 2019-08-29 RX ADMIN — FENTANYL CITRATE 50 MCG: 50 INJECTION INTRAMUSCULAR; INTRAVENOUS at 09:02

## 2019-08-29 RX ADMIN — SODIUM CHLORIDE: 9 INJECTION, SOLUTION INTRAVENOUS at 08:25

## 2019-08-29 ASSESSMENT — PAIN SCALES - GENERAL
PAINLEVEL_OUTOF10: 0

## 2019-08-29 ASSESSMENT — PAIN - FUNCTIONAL ASSESSMENT: PAIN_FUNCTIONAL_ASSESSMENT: 0-10

## 2019-08-29 NOTE — PRE SEDATION
Meds:   Home Meds:   Prior to Admission medications    Medication Sig Start Date End Date Taking? Authorizing Provider   nortriptyline (PAMELOR) 75 MG capsule TAKE 1 CAPSULE BY MOUTH NIGHTLY 8/21/19  Yes Tobin Medley MD   mirtazapine (REMERON) 15 MG tablet TAKE 1 TABLET BY MOUTH NIGHTLY 5/28/19  Yes Tobin Medley MD   furosemide (LASIX) 20 MG tablet TAKE 1 TO 2 TABLETS DAILY AS NEEDED FOR LEG SWELLING. 4/17/19  Yes Tobin Medley MD   sulfamethoxazole-trimethoprim (BACTRIM DS;SEPTRA DS) 800-160 MG per tablet  4/8/19  Yes Historical Provider, MD   atorvastatin (LIPITOR) 20 MG tablet Take 1 tablet by mouth daily 3/13/19  Yes Tobin Medley MD   silver sulfADIAZINE (SILVADENE) 1 % cream Apply topically daily to any open skin ulcers 3/13/19  Yes Tobin Medley MD   methadone (DOLOPHINE) 10 MG tablet Take 10 mg by mouth every 8 hours as needed for Pain. Takes 2 3 X aday . Yes Historical Provider, MD   oxyCODONE (OXY-IR) 30 MG immediate release tablet Take 30 mg by mouth every 4 hours as needed for Pain. .   Yes Historical Provider, MD   fentaNYL (DURAGESIC) 100 MCG/HR Place 1 patch onto the skin every 72 hours. Put on today . Yes Historical Provider, MD   aspirin EC 81 MG EC tablet Take 1 tablet by mouth daily 3/31/17  Yes Nikia Gonzalez MD   ondansetron [facility]) 8 MG tablet Take 1 tablet by mouth every 8 hours as needed for Nausea or Vomiting 1/5/16  Yes Dina Zaldivar MD   sennosides-docusate sodium (SENOKOT-S) 8.6-50 MG tablet Take 2 tablets by mouth daily  Patient taking differently: Take 2 tablets by mouth daily as needed  4/27/16   Tobin Medley MD     Coumadin Use Last 7 Days:  no  Antiplatelet drug therapy use last 7 days: yes - asa  Other anticoagulant use last 7 days: no  Additional Medication Information:        Pre-Sedation Documentation and Exam:   I have reviewed the patient's history and review of systems.     Mallampati Airway Assessment:  Mallampati Class II - (soft palate, fauces & uvula are visible)    Prior History of Anesthesia Complications:   none    ASA Classification:  Class 3 - A patient with severe systemic disease that limits activity but is not incapacitating    Sedation/ Anesthesia Plan:   intravenous sedation    Medications Planned:   midazolam (Versed) intravenously and fentanyl intravenously    Patient is an appropriate candidate for plan of sedation: yes    Electronically signed by Carly Mckeon MD on 8/29/2019 at 9:00 AM

## 2019-08-29 NOTE — PROGRESS NOTES
Discharge instructions given to pt and sister. Both express an understanding of instructions. Aspirated, flushed and deaccessed PAC per protocol. VSS.

## 2019-08-29 NOTE — H&P
VISIT,PROCEDURE ONLY Left 9/26/2018    LEFT HIP HEMIARTHROPLASTY performed by Jacquelyn Darnell MD at [place] [address].  4/2014    adenocarcinoma    TOE AMPUTATION N/A 04/07/2017    4th toe right foot    TUNNELED VENOUS PORT PLACEMENT      VAGINA SURGERY  12/08/2016    Exam under anesthesia, vaginal washout    VASCULAR SURGERY      week of 3/13/17 by dr Jj Cabrera     Current Outpatient Medications   Medication Sig Dispense Refill    nortriptyline (PAMELOR) 75 MG capsule TAKE 1 CAPSULE BY MOUTH NIGHTLY 90 capsule 1    mirtazapine (REMERON) 15 MG tablet TAKE 1 TABLET BY MOUTH NIGHTLY 30 tablet 5    furosemide (LASIX) 20 MG tablet TAKE 1 TO 2 TABLETS DAILY AS NEEDED FOR LEG SWELLING. 180 tablet 1    sulfamethoxazole-trimethoprim (BACTRIM DS;SEPTRA DS) 800-160 MG per tablet       atorvastatin (LIPITOR) 20 MG tablet Take 1 tablet by mouth daily 30 tablet 5    silver sulfADIAZINE (SILVADENE) 1 % cream Apply topically daily to any open skin ulcers 50 g 1    methadone (DOLOPHINE) 10 MG tablet Take 10 mg by mouth every 8 hours as needed for Pain. Takes 2 3 X aday .  oxyCODONE (OXY-IR) 30 MG immediate release tablet Take 30 mg by mouth every 4 hours as needed for Pain. Shyam Lucas fentaNYL (DURAGESIC) 100 MCG/HR Place 1 patch onto the skin every 72 hours. Put on today .       aspirin EC 81 MG EC tablet Take 1 tablet by mouth daily 30 tablet 3    ondansetron (ZOFRAN) 8 MG tablet Take 1 tablet by mouth every 8 hours as needed for Nausea or Vomiting 30 tablet 5    sennosides-docusate sodium (SENOKOT-S) 8.6-50 MG tablet Take 2 tablets by mouth daily (Patient taking differently: Take 2 tablets by mouth daily as needed ) 60 tablet 11     Current Facility-Administered Medications   Medication Dose Route Frequency Provider Last Rate Last Dose    clindamycin (CLEOCIN) 900 mg in dextrose 5 % 50 mL IVPB  900 mg Intravenous On Call to [address] [place] [address] MD Melanie 50 mL/hr at 08/29/19 0827 900 mg at 08/29/19 0827   

## 2019-09-16 ENCOUNTER — HOSPITAL ENCOUNTER (OUTPATIENT)
Dept: INFUSION THERAPY | Age: 64
Setting detail: INFUSION SERIES
Discharge: HOME | End: 2019-09-16
Payer: MEDICARE

## 2019-09-16 VITALS
OXYGEN SATURATION: 95 % | HEART RATE: 81 BPM | DIASTOLIC BLOOD PRESSURE: 78 MMHG | TEMPERATURE: 97.8 F | SYSTOLIC BLOOD PRESSURE: 130 MMHG | RESPIRATION RATE: 16 BRPM

## 2019-09-16 PROCEDURE — 86900 BLOOD TYPING SEROLOGIC ABO: CPT

## 2019-09-16 PROCEDURE — 86850 RBC ANTIBODY SCREEN: CPT

## 2019-09-16 PROCEDURE — 36415 COLL VENOUS BLD VENIPUNCTURE: CPT

## 2019-09-16 PROCEDURE — 2580000003 HC RX 258: Performed by: INTERNAL MEDICINE

## 2019-09-16 PROCEDURE — 36430 TRANSFUSION BLD/BLD COMPNT: CPT

## 2019-09-16 PROCEDURE — 86901 BLOOD TYPING SEROLOGIC RH(D): CPT

## 2019-09-16 PROCEDURE — 86923 COMPATIBILITY TEST ELECTRIC: CPT

## 2019-09-16 PROCEDURE — 2580000003 HC RX 258

## 2019-09-16 PROCEDURE — P9016 RBC LEUKOCYTES REDUCED: HCPCS

## 2019-09-16 RX ORDER — 0.9 % SODIUM CHLORIDE 0.9 %
250 INTRAVENOUS SOLUTION INTRAVENOUS ONCE
Status: COMPLETED | OUTPATIENT
Start: 2019-09-16 | End: 2019-09-16

## 2019-09-16 RX ORDER — SODIUM CHLORIDE 0.9 % (FLUSH) 0.9 %
10 SYRINGE (ML) INJECTION PRN
Status: DISCONTINUED | OUTPATIENT
Start: 2019-09-16 | End: 2019-09-17 | Stop reason: HOSPADM

## 2019-09-16 RX ORDER — SODIUM CHLORIDE 9 MG/ML
INJECTION, SOLUTION INTRAVENOUS
Status: COMPLETED
Start: 2019-09-16 | End: 2019-09-16

## 2019-09-16 RX ADMIN — Medication 20 ML: at 15:10

## 2019-09-16 RX ADMIN — Medication 20 ML: at 11:15

## 2019-09-16 RX ADMIN — SODIUM CHLORIDE 250 ML: 9 INJECTION, SOLUTION INTRAVENOUS at 12:24

## 2019-09-16 RX ADMIN — Medication 250 ML: at 12:24

## 2019-09-16 NOTE — PROGRESS NOTES
[address] [facility] Access for Labs    NAME:  Woody Hurt OF BIRTH:  1955  MEDICAL RECORD NUMBER:  [id number]  DATE:  9/16/2019    Patient arrived to [place] [address]   [] per wheelchair   [x] ambulatory     Port Site Location:  right chest    Port Site:  Redness: No  Bruising: No   Edema: No  Pain: No     Port Site cleansed with:  Chloroprep Scrub for 30 seconds and air dried? Yes    Port Accessed with: 19 Gauge . 75 inch Power Port needle using sterile technique. Blood return obtained: Yes    Labs:  Blood wasted: 10 ml  Labs drawn using a Vacutainer: Yes, Type and screen and extra lavender tube  Flushed with 20 ml Normal Saline using push-pause method. Port flushes without resistance. Port Deaccessed:  No: to receive 1 UPRBC    Response to treatment:  Well tolerated by patient. Education:    Verbalized understanding    Electronically signed by Sg Dallas RN on 9/16/2019 at [address] [place] [address]    Blood Transfusion    NAME:  Woody Hurt OF BIRTH:  1955  MEDICAL RECORD NUMBER:  [id number]  DATE:  9/16/2019     Patient arrived to [place] [address]   [] per wheelchair   [x] ambulatory     Alert and oriented X4 states this week had increased fatigue, denies dizziness or shortness of breath, walking slow, color pale. Has nephrostomy tubes changed on 4/35/62 without complication. Consent Obtained: Yes  Is this the patient's first Transfusion? No    Did the patient experience any adverse reactions to previous Transfusion?  No  Patient Active Problem List   Diagnosis    Adjustment reaction with prolonged depressive reaction    Asthma    Radiation proctitis    Essential hypertension    Colostomy status ([facility]    Cervical cancer ([facility]    Proctodynia    Malignant neoplasm of sigmoid colon ([facility]    Tobacco dependence    Asymptomatic gallstones    Metastasis to liver ([facility]    Insomnia    Urinary incontinence    Vaginal discharge    History of radiation therapy    Metastatic adenocarcinoma to lung (HCC)    Chemotherapy-induced neuropathy (HCC)    Constipation due to pain medication    Rectovaginal fistula    Vesico-vaginal fistula    Cancer associated pain    Nephrostomy status (HCC)    Chronic kidney disease, stage III (moderate) (HCC)    Mild protein-calorie malnutrition (HCC)    Anemia, iron deficiency    Right leg claudication ([facility]    Right iliac artery stenosis (HCC)    History of hypertension resolved with weight loss    Hypokalemia    Amputated toe of right foot (HCC) 4th toe due to ischemia    Closed fracture of neck of left femur (HCC)    Closed displaced fracture of left femoral neck (HCC)    Tachycardia    Hip fracture, left, closed, with routine healing, subsequent encounter    Arthritis of left knee    Lymphedema of both lower extremities     Patient with Bone Marrow Suppression due to chemotherapy? Yes colon CA with mets and vesicovaginal fistula bleeding. Patient with history of GI bleeding? No  Patient with history of CHF? No  Patient with history of COPD?  No    Hemoglobin/Hematocrit:    Lab Results   Component Value Date    HGB 7.5 02/28/2019    HCT 24.1 02/28/2019     HGB per [facility] 7.0 on 9/12/19  Special Transfusion Products Requested: No  Blood was:  []  Irradiated    [] Washed    [] Other    Is the patient experiencing any:  Fatigue:   []   None  []   Increase over baseline but not altering normal activities  []   Moderate of causing difficulty performing some activities  [x]   Severe or loss of ability to perform some activities states was sleeping almost continuously for 2 days  []   Bedridden or disabling    Dizziness or Lightheadedness:   [x]   None  []   No Interference  []   Interferes with functioning but not activities of daily living  []   Interferes with daily activies  []   Bedridden or disabling    Shortness of Breath:   [x]   None   []   Dyspneic on exertion   []   Dyspnea with normal activities  []   Dyspnea at rest    Breath Sounds: No increased work of breathing at rest, Breath sounds diminished but clear to auscultation bilaterally. Occasional non productive cough. Edema: none Location of edema: nothing    Tachycardia: No    Heart Palpitations: No      Chest Pain: No    Premedicated: None ordered  [] Tylenol 325 mg oral  [] Tylenol 650 mg oral    [] Benadryl 25 mg oral   [] Benadryl 50 mg oral  [] Other    Administered Blood via: [] Peripheral access    [] PICC access    [x] Port access    Numbers of units transfused 1 over 2 hours    Monitoring of vital signs and rate changes are documented in flow sheets. Response to treatment:  Well tolerated by patient. Education:    Verbalized understanding, written and verbal instructions given      Electronically signed by Obinna Yang RN on 9/16/2019 at 11:22 AM    Port flushed with 20 ml NS using push-pause method per protocol. Port deaccessed. 2 x 2 gauze  to site. Patient tolerated well.   Electronically signed by Obinna Yang RN on 9/16/2019 at 3:31 PM

## 2019-10-02 ENCOUNTER — HOSPITAL ENCOUNTER (INPATIENT)
Age: 64
LOS: 8 days | Discharge: HOME | DRG: 542 | End: 2019-10-10
Attending: INTERNAL MEDICINE | Admitting: FAMILY MEDICINE
Payer: MEDICARE

## 2019-10-02 ENCOUNTER — APPOINTMENT (OUTPATIENT)
Dept: CT IMAGING | Age: 64
DRG: 542 | End: 2019-10-02
Payer: MEDICARE

## 2019-10-02 DIAGNOSIS — G89.29 CHRONIC MIDLINE LOW BACK PAIN WITHOUT SCIATICA: ICD-10-CM

## 2019-10-02 DIAGNOSIS — M89.9 LYTIC BONE LESIONS ON XRAY: ICD-10-CM

## 2019-10-02 DIAGNOSIS — Z85.89 HISTORY OF MALIGNANT NEOPLASM METASTATIC TO LUNG: ICD-10-CM

## 2019-10-02 DIAGNOSIS — Z93.6 NEPHROSTOMY STATUS (HCC): ICD-10-CM

## 2019-10-02 DIAGNOSIS — D64.9 ACUTE ANEMIA: Primary | ICD-10-CM

## 2019-10-02 DIAGNOSIS — N39.0 ACUTE URINARY TRACT INFECTION: ICD-10-CM

## 2019-10-02 DIAGNOSIS — M54.50 CHRONIC MIDLINE LOW BACK PAIN WITHOUT SCIATICA: ICD-10-CM

## 2019-10-02 PROBLEM — M54.9 BACK PAIN: Status: ACTIVE | Noted: 2019-10-02

## 2019-10-02 LAB
A/G RATIO: 0.8 (ref 1.1–2.2)
ABO/RH: NORMAL
ALBUMIN SERPL-MCNC: 2.8 G/DL (ref 3.4–5)
ALP BLD-CCNC: 133 U/L (ref 40–129)
ALT SERPL-CCNC: <5 U/L (ref 10–40)
ANION GAP SERPL CALCULATED.3IONS-SCNC: 14 MMOL/L (ref 3–16)
ANTIBODY SCREEN: NORMAL
AST SERPL-CCNC: 9 U/L (ref 15–37)
BACTERIA: ABNORMAL /HPF
BASOPHILS ABSOLUTE: 0 K/UL (ref 0–0.2)
BASOPHILS RELATIVE PERCENT: 0.9 %
BILIRUB SERPL-MCNC: <0.2 MG/DL (ref 0–1)
BILIRUBIN URINE: NEGATIVE
BLOOD, URINE: NEGATIVE
BUN BLDV-MCNC: 20 MG/DL (ref 7–20)
CALCIUM SERPL-MCNC: 7.5 MG/DL (ref 8.3–10.6)
CHLORIDE BLD-SCNC: 103 MMOL/L (ref 99–110)
CLARITY: ABNORMAL
CO2: 19 MMOL/L (ref 21–32)
COLOR: YELLOW
CREAT SERPL-MCNC: 1.5 MG/DL (ref 0.6–1.2)
EOSINOPHILS ABSOLUTE: 0 K/UL (ref 0–0.6)
EOSINOPHILS RELATIVE PERCENT: 0.7 %
GFR AFRICAN AMERICAN: 43
GFR NON-AFRICAN AMERICAN: 35
GLOBULIN: 3.4 G/DL
GLUCOSE BLD-MCNC: 108 MG/DL (ref 70–99)
GLUCOSE URINE: NEGATIVE MG/DL
HCT VFR BLD CALC: 18.1 % (ref 36–48)
HEMOGLOBIN: 5.9 G/DL (ref 12–16)
KETONES, URINE: NEGATIVE MG/DL
LEUKOCYTE ESTERASE, URINE: ABNORMAL
LIPASE: 11 U/L (ref 13–60)
LYMPHOCYTES ABSOLUTE: 0.2 K/UL (ref 1–5.1)
LYMPHOCYTES RELATIVE PERCENT: 4.5 %
MCH RBC QN AUTO: 29.3 PG (ref 26–34)
MCHC RBC AUTO-ENTMCNC: 32.8 G/DL (ref 31–36)
MCV RBC AUTO: 89.3 FL (ref 80–100)
MICROSCOPIC EXAMINATION: YES
MONOCYTES ABSOLUTE: 0.3 K/UL (ref 0–1.3)
MONOCYTES RELATIVE PERCENT: 8 %
NEUTROPHILS ABSOLUTE: 2.9 K/UL (ref 1.7–7.7)
NEUTROPHILS RELATIVE PERCENT: 85.9 %
NITRITE, URINE: POSITIVE
PDW BLD-RTO: 18.3 % (ref 12.4–15.4)
PH UA: 6 (ref 5–8)
PLATELET # BLD: 106 K/UL (ref 135–450)
PMV BLD AUTO: 6.7 FL (ref 5–10.5)
POTASSIUM REFLEX MAGNESIUM: 3.8 MMOL/L (ref 3.5–5.1)
PROTEIN UA: 30 MG/DL
RBC # BLD: 2.03 M/UL (ref 4–5.2)
RBC UA: ABNORMAL /HPF (ref 0–2)
SODIUM BLD-SCNC: 136 MMOL/L (ref 136–145)
SPECIFIC GRAVITY UA: 1.01 (ref 1–1.03)
TOTAL PROTEIN: 6.2 G/DL (ref 6.4–8.2)
URINE REFLEX TO CULTURE: YES
URINE TYPE: ABNORMAL
UROBILINOGEN, URINE: 0.2 E.U./DL
WBC # BLD: 3.4 K/UL (ref 4–11)
WBC UA: ABNORMAL /HPF (ref 0–5)

## 2019-10-02 PROCEDURE — 36415 COLL VENOUS BLD VENIPUNCTURE: CPT

## 2019-10-02 PROCEDURE — 86923 COMPATIBILITY TEST ELECTRIC: CPT

## 2019-10-02 PROCEDURE — 85025 COMPLETE CBC W/AUTO DIFF WBC: CPT

## 2019-10-02 PROCEDURE — 2060000000 HC ICU INTERMEDIATE R&B

## 2019-10-02 PROCEDURE — 2580000003 HC RX 258: Performed by: PHYSICIAN ASSISTANT

## 2019-10-02 PROCEDURE — 87186 SC STD MICRODIL/AGAR DIL: CPT

## 2019-10-02 PROCEDURE — 99285 EMERGENCY DEPT VISIT HI MDM: CPT

## 2019-10-02 PROCEDURE — 72131 CT LUMBAR SPINE W/O DYE: CPT

## 2019-10-02 PROCEDURE — 36430 TRANSFUSION BLD/BLD COMPNT: CPT

## 2019-10-02 PROCEDURE — 96365 THER/PROPH/DIAG IV INF INIT: CPT

## 2019-10-02 PROCEDURE — 6370000000 HC RX 637 (ALT 250 FOR IP): Performed by: PHYSICIAN ASSISTANT

## 2019-10-02 PROCEDURE — P9016 RBC LEUKOCYTES REDUCED: HCPCS

## 2019-10-02 PROCEDURE — 81001 URINALYSIS AUTO W/SCOPE: CPT

## 2019-10-02 PROCEDURE — 83690 ASSAY OF LIPASE: CPT

## 2019-10-02 PROCEDURE — 87077 CULTURE AEROBIC IDENTIFY: CPT

## 2019-10-02 PROCEDURE — 87086 URINE CULTURE/COLONY COUNT: CPT

## 2019-10-02 PROCEDURE — 74176 CT ABD & PELVIS W/O CONTRAST: CPT

## 2019-10-02 PROCEDURE — 86900 BLOOD TYPING SEROLOGIC ABO: CPT

## 2019-10-02 PROCEDURE — 86901 BLOOD TYPING SEROLOGIC RH(D): CPT

## 2019-10-02 PROCEDURE — 80053 COMPREHEN METABOLIC PANEL: CPT

## 2019-10-02 PROCEDURE — 86850 RBC ANTIBODY SCREEN: CPT

## 2019-10-02 PROCEDURE — 6360000002 HC RX W HCPCS: Performed by: PHYSICIAN ASSISTANT

## 2019-10-02 RX ORDER — 0.9 % SODIUM CHLORIDE 0.9 %
250 INTRAVENOUS SOLUTION INTRAVENOUS ONCE
Status: COMPLETED | OUTPATIENT
Start: 2019-10-02 | End: 2019-10-03

## 2019-10-02 RX ORDER — OXYCODONE HYDROCHLORIDE 30 MG/1
30 TABLET ORAL ONCE
Status: COMPLETED | OUTPATIENT
Start: 2019-10-02 | End: 2019-10-02

## 2019-10-02 RX ORDER — FUROSEMIDE 20 MG/1
20 TABLET ORAL 2 TIMES DAILY
COMMUNITY
Start: 2019-04-17 | End: 2019-12-16

## 2019-10-02 RX ADMIN — OXYCODONE HYDROCHLORIDE 30 MG: 30 TABLET ORAL at 22:02

## 2019-10-02 RX ADMIN — CEFTRIAXONE 1 G: 1 INJECTION, POWDER, FOR SOLUTION INTRAMUSCULAR; INTRAVENOUS at 22:02

## 2019-10-02 RX ADMIN — OXYCODONE HYDROCHLORIDE 30 MG: 30 TABLET ORAL at 20:33

## 2019-10-02 ASSESSMENT — PAIN DESCRIPTION - ORIENTATION
ORIENTATION: RIGHT
ORIENTATION: RIGHT

## 2019-10-02 ASSESSMENT — PAIN SCALES - GENERAL
PAINLEVEL_OUTOF10: 7
PAINLEVEL_OUTOF10: 9

## 2019-10-02 ASSESSMENT — PAIN DESCRIPTION - PAIN TYPE
TYPE: ACUTE PAIN
TYPE: ACUTE PAIN

## 2019-10-02 ASSESSMENT — PAIN DESCRIPTION - LOCATION
LOCATION: LEG
LOCATION: BACK

## 2019-10-02 ASSESSMENT — PAIN DESCRIPTION - ONSET
ONSET: GRADUAL
ONSET: AWAKENED FROM SLEEP

## 2019-10-02 ASSESSMENT — PAIN DESCRIPTION - DESCRIPTORS
DESCRIPTORS: ACHING;SHARP
DESCRIPTORS: THROBBING

## 2019-10-02 ASSESSMENT — PAIN DESCRIPTION - PROGRESSION
CLINICAL_PROGRESSION: GRADUALLY IMPROVING
CLINICAL_PROGRESSION: NOT CHANGED

## 2019-10-02 ASSESSMENT — PAIN DESCRIPTION - FREQUENCY
FREQUENCY: CONTINUOUS
FREQUENCY: CONTINUOUS

## 2019-10-02 ASSESSMENT — PAIN - FUNCTIONAL ASSESSMENT: PAIN_FUNCTIONAL_ASSESSMENT: ACTIVITIES ARE NOT PREVENTED

## 2019-10-03 PROBLEM — E43 SEVERE MALNUTRITION (HCC): Chronic | Status: ACTIVE | Noted: 2019-10-03

## 2019-10-03 PROBLEM — D64.9 ANEMIA: Status: ACTIVE | Noted: 2019-10-03

## 2019-10-03 LAB
ANION GAP SERPL CALCULATED.3IONS-SCNC: 13 MMOL/L (ref 3–16)
APTT: 34 SEC (ref 26–36)
BLOOD BANK DISPENSE STATUS: NORMAL
BLOOD BANK DISPENSE STATUS: NORMAL
BLOOD BANK PRODUCT CODE: NORMAL
BLOOD BANK PRODUCT CODE: NORMAL
BPU ID: NORMAL
BPU ID: NORMAL
BUN BLDV-MCNC: 19 MG/DL (ref 7–20)
CALCIUM SERPL-MCNC: 8.1 MG/DL (ref 8.3–10.6)
CHLORIDE BLD-SCNC: 103 MMOL/L (ref 99–110)
CO2: 21 MMOL/L (ref 21–32)
CREAT SERPL-MCNC: 1.6 MG/DL (ref 0.6–1.2)
DESCRIPTION BLOOD BANK: NORMAL
DESCRIPTION BLOOD BANK: NORMAL
GFR AFRICAN AMERICAN: 40
GFR NON-AFRICAN AMERICAN: 33
GLUCOSE BLD-MCNC: 203 MG/DL (ref 70–99)
GLUCOSE BLD-MCNC: 89 MG/DL (ref 70–99)
HCT VFR BLD CALC: 24.1 % (ref 36–48)
HEMOGLOBIN: 8 G/DL (ref 12–16)
INR BLD: 1.4 (ref 0.86–1.14)
MCH RBC QN AUTO: 28.1 PG (ref 26–34)
MCHC RBC AUTO-ENTMCNC: 33.3 G/DL (ref 31–36)
MCV RBC AUTO: 84.4 FL (ref 80–100)
PDW BLD-RTO: 18.7 % (ref 12.4–15.4)
PERFORMED ON: ABNORMAL
PLATELET # BLD: 109 K/UL (ref 135–450)
PMV BLD AUTO: 6.7 FL (ref 5–10.5)
POTASSIUM REFLEX MAGNESIUM: 4.3 MMOL/L (ref 3.5–5.1)
PROTHROMBIN TIME: 16 SEC (ref 9.8–13)
RBC # BLD: 2.85 M/UL (ref 4–5.2)
SODIUM BLD-SCNC: 137 MMOL/L (ref 136–145)
WBC # BLD: 3.2 K/UL (ref 4–11)

## 2019-10-03 PROCEDURE — P9016 RBC LEUKOCYTES REDUCED: HCPCS

## 2019-10-03 PROCEDURE — 6370000000 HC RX 637 (ALT 250 FOR IP): Performed by: NURSE PRACTITIONER

## 2019-10-03 PROCEDURE — 85730 THROMBOPLASTIN TIME PARTIAL: CPT

## 2019-10-03 PROCEDURE — 94760 N-INVAS EAR/PLS OXIMETRY 1: CPT

## 2019-10-03 PROCEDURE — 36591 DRAW BLOOD OFF VENOUS DEVICE: CPT

## 2019-10-03 PROCEDURE — 85027 COMPLETE CBC AUTOMATED: CPT

## 2019-10-03 PROCEDURE — 80048 BASIC METABOLIC PNL TOTAL CA: CPT

## 2019-10-03 PROCEDURE — 2580000003 HC RX 258: Performed by: NURSE PRACTITIONER

## 2019-10-03 PROCEDURE — 2580000003 HC RX 258: Performed by: PHYSICIAN ASSISTANT

## 2019-10-03 PROCEDURE — 36430 TRANSFUSION BLD/BLD COMPNT: CPT

## 2019-10-03 PROCEDURE — 85610 PROTHROMBIN TIME: CPT

## 2019-10-03 PROCEDURE — 6360000002 HC RX W HCPCS: Performed by: INTERNAL MEDICINE

## 2019-10-03 PROCEDURE — 2060000000 HC ICU INTERMEDIATE R&B

## 2019-10-03 RX ORDER — SODIUM CHLORIDE 0.9 % (FLUSH) 0.9 %
10 SYRINGE (ML) INJECTION EVERY 12 HOURS SCHEDULED
Status: DISCONTINUED | OUTPATIENT
Start: 2019-10-03 | End: 2019-10-10 | Stop reason: HOSPADM

## 2019-10-03 RX ORDER — ASPIRIN 81 MG/1
81 TABLET ORAL DAILY
Status: DISCONTINUED | OUTPATIENT
Start: 2019-10-03 | End: 2019-10-10 | Stop reason: HOSPADM

## 2019-10-03 RX ORDER — SODIUM CHLORIDE 0.9 % (FLUSH) 0.9 %
10 SYRINGE (ML) INJECTION PRN
Status: DISCONTINUED | OUTPATIENT
Start: 2019-10-03 | End: 2019-10-10 | Stop reason: HOSPADM

## 2019-10-03 RX ORDER — NORTRIPTYLINE HYDROCHLORIDE 25 MG/1
75 CAPSULE ORAL NIGHTLY
Status: DISCONTINUED | OUTPATIENT
Start: 2019-10-03 | End: 2019-10-10 | Stop reason: HOSPADM

## 2019-10-03 RX ORDER — OXYCODONE HYDROCHLORIDE 30 MG/1
90 TABLET ORAL EVERY 4 HOURS PRN
Status: DISCONTINUED | OUTPATIENT
Start: 2019-10-03 | End: 2019-10-10 | Stop reason: HOSPADM

## 2019-10-03 RX ORDER — MIRTAZAPINE 15 MG/1
15 TABLET, FILM COATED ORAL NIGHTLY
Status: DISCONTINUED | OUTPATIENT
Start: 2019-10-03 | End: 2019-10-10 | Stop reason: HOSPADM

## 2019-10-03 RX ORDER — DEXAMETHASONE SODIUM PHOSPHATE 4 MG/ML
4 INJECTION, SOLUTION INTRA-ARTICULAR; INTRALESIONAL; INTRAMUSCULAR; INTRAVENOUS; SOFT TISSUE EVERY 8 HOURS SCHEDULED
Status: DISCONTINUED | OUTPATIENT
Start: 2019-10-03 | End: 2019-10-10 | Stop reason: HOSPADM

## 2019-10-03 RX ORDER — ONDANSETRON 2 MG/ML
4 INJECTION INTRAMUSCULAR; INTRAVENOUS EVERY 6 HOURS PRN
Status: DISCONTINUED | OUTPATIENT
Start: 2019-10-03 | End: 2019-10-10 | Stop reason: HOSPADM

## 2019-10-03 RX ORDER — SENNA AND DOCUSATE SODIUM 50; 8.6 MG/1; MG/1
2 TABLET, FILM COATED ORAL DAILY PRN
Status: DISCONTINUED | OUTPATIENT
Start: 2019-10-03 | End: 2019-10-10 | Stop reason: HOSPADM

## 2019-10-03 RX ORDER — ACETAMINOPHEN 325 MG/1
650 TABLET ORAL EVERY 4 HOURS PRN
Status: DISCONTINUED | OUTPATIENT
Start: 2019-10-03 | End: 2019-10-10 | Stop reason: HOSPADM

## 2019-10-03 RX ORDER — FUROSEMIDE 20 MG/1
20 TABLET ORAL 2 TIMES DAILY
Status: DISCONTINUED | OUTPATIENT
Start: 2019-10-03 | End: 2019-10-10 | Stop reason: HOSPADM

## 2019-10-03 RX ORDER — ATORVASTATIN CALCIUM 20 MG/1
20 TABLET, FILM COATED ORAL DAILY
Status: DISCONTINUED | OUTPATIENT
Start: 2019-10-03 | End: 2019-10-10 | Stop reason: HOSPADM

## 2019-10-03 RX ORDER — FENTANYL 100 UG/H
1 PATCH TRANSDERMAL
Status: DISCONTINUED | OUTPATIENT
Start: 2019-10-04 | End: 2019-10-10 | Stop reason: HOSPADM

## 2019-10-03 RX ORDER — SULFAMETHOXAZOLE AND TRIMETHOPRIM 800; 160 MG/1; MG/1
1 TABLET ORAL 2 TIMES DAILY
Status: DISCONTINUED | OUTPATIENT
Start: 2019-10-03 | End: 2019-10-05

## 2019-10-03 RX ADMIN — OXYCODONE HYDROCHLORIDE 90 MG: 30 TABLET ORAL at 00:51

## 2019-10-03 RX ADMIN — OXYCODONE HYDROCHLORIDE 90 MG: 30 TABLET ORAL at 15:55

## 2019-10-03 RX ADMIN — MIRTAZAPINE 15 MG: 15 TABLET, FILM COATED ORAL at 21:29

## 2019-10-03 RX ADMIN — OXYCODONE HYDROCHLORIDE 90 MG: 30 TABLET ORAL at 05:23

## 2019-10-03 RX ADMIN — HYDROMORPHONE HYDROCHLORIDE 1 MG: 1 INJECTION, SOLUTION INTRAMUSCULAR; INTRAVENOUS; SUBCUTANEOUS at 18:18

## 2019-10-03 RX ADMIN — FUROSEMIDE 20 MG: 20 TABLET ORAL at 18:18

## 2019-10-03 RX ADMIN — SODIUM CHLORIDE, PRESERVATIVE FREE 10 ML: 5 INJECTION INTRAVENOUS at 08:16

## 2019-10-03 RX ADMIN — SULFAMETHOXAZOLE AND TRIMETHOPRIM 1 TABLET: 800; 160 TABLET ORAL at 21:29

## 2019-10-03 RX ADMIN — OXYCODONE HYDROCHLORIDE 90 MG: 30 TABLET ORAL at 21:29

## 2019-10-03 RX ADMIN — ATORVASTATIN CALCIUM 20 MG: 20 TABLET, FILM COATED ORAL at 08:16

## 2019-10-03 RX ADMIN — SULFAMETHOXAZOLE AND TRIMETHOPRIM 1 TABLET: 800; 160 TABLET ORAL at 08:16

## 2019-10-03 RX ADMIN — MAGNESIUM HYDROXIDE 30 ML: 400 SUSPENSION ORAL at 08:16

## 2019-10-03 RX ADMIN — HYDROMORPHONE HYDROCHLORIDE 1 MG: 1 INJECTION, SOLUTION INTRAMUSCULAR; INTRAVENOUS; SUBCUTANEOUS at 15:03

## 2019-10-03 RX ADMIN — FUROSEMIDE 20 MG: 20 TABLET ORAL at 08:16

## 2019-10-03 RX ADMIN — NORTRIPTYLINE HYDROCHLORIDE 75 MG: 25 CAPSULE ORAL at 00:51

## 2019-10-03 RX ADMIN — DEXAMETHASONE SODIUM PHOSPHATE 4 MG: 4 INJECTION, SOLUTION INTRA-ARTICULAR; INTRALESIONAL; INTRAMUSCULAR; INTRAVENOUS; SOFT TISSUE at 15:04

## 2019-10-03 RX ADMIN — DEXAMETHASONE SODIUM PHOSPHATE 4 MG: 4 INJECTION, SOLUTION INTRA-ARTICULAR; INTRALESIONAL; INTRAMUSCULAR; INTRAVENOUS; SOFT TISSUE at 21:29

## 2019-10-03 RX ADMIN — SODIUM CHLORIDE 250 ML: 9 INJECTION, SOLUTION INTRAVENOUS at 00:53

## 2019-10-03 RX ADMIN — SODIUM CHLORIDE, PRESERVATIVE FREE 10 ML: 5 INJECTION INTRAVENOUS at 21:35

## 2019-10-03 RX ADMIN — SENNOSIDES, DOCUSATE SODIUM 2 TABLET: 50; 8.6 TABLET, FILM COATED ORAL at 08:16

## 2019-10-03 RX ADMIN — SULFAMETHOXAZOLE AND TRIMETHOPRIM 1 TABLET: 800; 160 TABLET ORAL at 00:51

## 2019-10-03 RX ADMIN — MIRTAZAPINE 15 MG: 15 TABLET, FILM COATED ORAL at 00:51

## 2019-10-03 RX ADMIN — NORTRIPTYLINE HYDROCHLORIDE 75 MG: 25 CAPSULE ORAL at 21:29

## 2019-10-03 RX ADMIN — ASPIRIN 81 MG: 81 TABLET, COATED ORAL at 08:17

## 2019-10-03 RX ADMIN — HYDROMORPHONE HYDROCHLORIDE 1 MG: 1 INJECTION, SOLUTION INTRAMUSCULAR; INTRAVENOUS; SUBCUTANEOUS at 13:23

## 2019-10-03 ASSESSMENT — PAIN DESCRIPTION - ORIENTATION
ORIENTATION: LOWER;RIGHT
ORIENTATION: RIGHT

## 2019-10-03 ASSESSMENT — PAIN SCALES - GENERAL
PAINLEVEL_OUTOF10: 6
PAINLEVEL_OUTOF10: 9
PAINLEVEL_OUTOF10: 5
PAINLEVEL_OUTOF10: 8
PAINLEVEL_OUTOF10: 9
PAINLEVEL_OUTOF10: 7
PAINLEVEL_OUTOF10: 8
PAINLEVEL_OUTOF10: 7
PAINLEVEL_OUTOF10: 0
PAINLEVEL_OUTOF10: 8
PAINLEVEL_OUTOF10: 0

## 2019-10-03 ASSESSMENT — PAIN DESCRIPTION - DIRECTION: RADIATING_TOWARDS: RIGHT LEG

## 2019-10-03 ASSESSMENT — PAIN DESCRIPTION - ONSET
ONSET: ON-GOING
ONSET: SUDDEN
ONSET: ON-GOING

## 2019-10-03 ASSESSMENT — PAIN DESCRIPTION - PROGRESSION
CLINICAL_PROGRESSION: NOT CHANGED
CLINICAL_PROGRESSION: GRADUALLY WORSENING
CLINICAL_PROGRESSION: NOT CHANGED
CLINICAL_PROGRESSION: NOT CHANGED
CLINICAL_PROGRESSION: GRADUALLY WORSENING
CLINICAL_PROGRESSION: NOT CHANGED
CLINICAL_PROGRESSION: NOT CHANGED

## 2019-10-03 ASSESSMENT — PAIN DESCRIPTION - PAIN TYPE
TYPE: ACUTE PAIN;CHRONIC PAIN
TYPE: ACUTE PAIN
TYPE: ACUTE PAIN;CHRONIC PAIN

## 2019-10-03 ASSESSMENT — PAIN DESCRIPTION - DESCRIPTORS
DESCRIPTORS: ACHING
DESCRIPTORS: SHOOTING
DESCRIPTORS: SHOOTING
DESCRIPTORS: ACHING
DESCRIPTORS: SHOOTING

## 2019-10-03 ASSESSMENT — PAIN DESCRIPTION - FREQUENCY
FREQUENCY: CONTINUOUS

## 2019-10-03 ASSESSMENT — PAIN - FUNCTIONAL ASSESSMENT
PAIN_FUNCTIONAL_ASSESSMENT: PREVENTS OR INTERFERES SOME ACTIVE ACTIVITIES AND ADLS
PAIN_FUNCTIONAL_ASSESSMENT: PREVENTS OR INTERFERES WITH MANY ACTIVE NOT PASSIVE ACTIVITIES
PAIN_FUNCTIONAL_ASSESSMENT: ACTIVITIES ARE NOT PREVENTED
PAIN_FUNCTIONAL_ASSESSMENT: PREVENTS OR INTERFERES SOME ACTIVE ACTIVITIES AND ADLS
PAIN_FUNCTIONAL_ASSESSMENT: PREVENTS OR INTERFERES SOME ACTIVE ACTIVITIES AND ADLS
PAIN_FUNCTIONAL_ASSESSMENT: PREVENTS OR INTERFERES WITH MANY ACTIVE NOT PASSIVE ACTIVITIES
PAIN_FUNCTIONAL_ASSESSMENT: ACTIVITIES ARE NOT PREVENTED

## 2019-10-03 ASSESSMENT — PAIN DESCRIPTION - LOCATION
LOCATION: BACK;LEG
LOCATION: BACK;LEG
LOCATION: BACK
LOCATION: BACK;LEG
LOCATION: BACK
LOCATION: BACK;LEG
LOCATION: BACK;LEG

## 2019-10-04 PROCEDURE — 2580000003 HC RX 258: Performed by: NURSE PRACTITIONER

## 2019-10-04 PROCEDURE — 94760 N-INVAS EAR/PLS OXIMETRY 1: CPT

## 2019-10-04 PROCEDURE — 2060000000 HC ICU INTERMEDIATE R&B

## 2019-10-04 PROCEDURE — 6370000000 HC RX 637 (ALT 250 FOR IP): Performed by: NURSE PRACTITIONER

## 2019-10-04 PROCEDURE — 6360000002 HC RX W HCPCS: Performed by: INTERNAL MEDICINE

## 2019-10-04 PROCEDURE — 6370000000 HC RX 637 (ALT 250 FOR IP): Performed by: HOSPITALIST

## 2019-10-04 RX ORDER — METHADONE HYDROCHLORIDE 10 MG/1
10 TABLET ORAL DAILY
Status: DISCONTINUED | OUTPATIENT
Start: 2019-10-04 | End: 2019-10-10 | Stop reason: HOSPADM

## 2019-10-04 RX ORDER — METHADONE HYDROCHLORIDE 10 MG/1
20 TABLET ORAL NIGHTLY
Status: DISCONTINUED | OUTPATIENT
Start: 2019-10-04 | End: 2019-10-10 | Stop reason: HOSPADM

## 2019-10-04 RX ADMIN — SODIUM CHLORIDE, PRESERVATIVE FREE 10 ML: 5 INJECTION INTRAVENOUS at 10:07

## 2019-10-04 RX ADMIN — METHADONE HYDROCHLORIDE 20 MG: 10 TABLET ORAL at 20:18

## 2019-10-04 RX ADMIN — SODIUM CHLORIDE, PRESERVATIVE FREE 10 ML: 5 INJECTION INTRAVENOUS at 20:20

## 2019-10-04 RX ADMIN — DEXAMETHASONE SODIUM PHOSPHATE 4 MG: 4 INJECTION, SOLUTION INTRA-ARTICULAR; INTRALESIONAL; INTRAMUSCULAR; INTRAVENOUS; SOFT TISSUE at 21:43

## 2019-10-04 RX ADMIN — FUROSEMIDE 20 MG: 20 TABLET ORAL at 18:15

## 2019-10-04 RX ADMIN — OXYCODONE HYDROCHLORIDE 90 MG: 30 TABLET ORAL at 20:18

## 2019-10-04 RX ADMIN — MIRTAZAPINE 15 MG: 15 TABLET, FILM COATED ORAL at 20:17

## 2019-10-04 RX ADMIN — SULFAMETHOXAZOLE AND TRIMETHOPRIM 1 TABLET: 800; 160 TABLET ORAL at 20:16

## 2019-10-04 RX ADMIN — METHADONE HYDROCHLORIDE 10 MG: 10 TABLET ORAL at 10:59

## 2019-10-04 RX ADMIN — SULFAMETHOXAZOLE AND TRIMETHOPRIM 1 TABLET: 800; 160 TABLET ORAL at 10:07

## 2019-10-04 RX ADMIN — ASPIRIN 81 MG: 81 TABLET, COATED ORAL at 10:07

## 2019-10-04 RX ADMIN — OXYCODONE HYDROCHLORIDE 90 MG: 30 TABLET ORAL at 14:29

## 2019-10-04 RX ADMIN — HYDROMORPHONE HYDROCHLORIDE 1 MG: 1 INJECTION, SOLUTION INTRAMUSCULAR; INTRAVENOUS; SUBCUTANEOUS at 18:15

## 2019-10-04 RX ADMIN — NORTRIPTYLINE HYDROCHLORIDE 75 MG: 25 CAPSULE ORAL at 20:16

## 2019-10-04 RX ADMIN — FUROSEMIDE 20 MG: 20 TABLET ORAL at 10:07

## 2019-10-04 RX ADMIN — ATORVASTATIN CALCIUM 20 MG: 20 TABLET, FILM COATED ORAL at 10:07

## 2019-10-04 RX ADMIN — HYDROMORPHONE HYDROCHLORIDE 1 MG: 1 INJECTION, SOLUTION INTRAMUSCULAR; INTRAVENOUS; SUBCUTANEOUS at 15:54

## 2019-10-04 RX ADMIN — OXYCODONE HYDROCHLORIDE 90 MG: 30 TABLET ORAL at 07:49

## 2019-10-04 RX ADMIN — DEXAMETHASONE SODIUM PHOSPHATE 4 MG: 4 INJECTION, SOLUTION INTRA-ARTICULAR; INTRALESIONAL; INTRAMUSCULAR; INTRAVENOUS; SOFT TISSUE at 15:53

## 2019-10-04 RX ADMIN — DEXAMETHASONE SODIUM PHOSPHATE 4 MG: 4 INJECTION, SOLUTION INTRA-ARTICULAR; INTRALESIONAL; INTRAMUSCULAR; INTRAVENOUS; SOFT TISSUE at 06:40

## 2019-10-04 ASSESSMENT — PAIN DESCRIPTION - PAIN TYPE
TYPE: CHRONIC PAIN
TYPE: ACUTE PAIN;CHRONIC PAIN
TYPE: ACUTE PAIN;CHRONIC PAIN
TYPE: CHRONIC PAIN
TYPE: ACUTE PAIN;CHRONIC PAIN
TYPE: ACUTE PAIN;CHRONIC PAIN
TYPE: CHRONIC PAIN

## 2019-10-04 ASSESSMENT — PAIN DESCRIPTION - FREQUENCY
FREQUENCY: CONTINUOUS

## 2019-10-04 ASSESSMENT — PAIN DESCRIPTION - PROGRESSION
CLINICAL_PROGRESSION: NOT CHANGED
CLINICAL_PROGRESSION: GRADUALLY IMPROVING
CLINICAL_PROGRESSION: NOT CHANGED

## 2019-10-04 ASSESSMENT — PAIN DESCRIPTION - ONSET
ONSET: ON-GOING

## 2019-10-04 ASSESSMENT — PAIN - FUNCTIONAL ASSESSMENT
PAIN_FUNCTIONAL_ASSESSMENT: PREVENTS OR INTERFERES WITH ALL ACTIVE AND SOME PASSIVE ACTIVITIES
PAIN_FUNCTIONAL_ASSESSMENT: PREVENTS OR INTERFERES SOME ACTIVE ACTIVITIES AND ADLS
PAIN_FUNCTIONAL_ASSESSMENT: PREVENTS OR INTERFERES SOME ACTIVE ACTIVITIES AND ADLS

## 2019-10-04 ASSESSMENT — PAIN DESCRIPTION - ORIENTATION
ORIENTATION: LOWER;RIGHT
ORIENTATION: LOWER;RIGHT
ORIENTATION: RIGHT;LOWER
ORIENTATION: LOWER;RIGHT
ORIENTATION: RIGHT;LOWER

## 2019-10-04 ASSESSMENT — PAIN DESCRIPTION - LOCATION
LOCATION: BACK;LEG

## 2019-10-04 ASSESSMENT — PAIN DESCRIPTION - DESCRIPTORS
DESCRIPTORS: ACHING
DESCRIPTORS: SHOOTING
DESCRIPTORS: ACHING
DESCRIPTORS: SHOOTING;SHARP
DESCRIPTORS: SHOOTING
DESCRIPTORS: SHOOTING;RADIATING
DESCRIPTORS: SHOOTING

## 2019-10-04 ASSESSMENT — PAIN SCALES - GENERAL
PAINLEVEL_OUTOF10: 7
PAINLEVEL_OUTOF10: 9
PAINLEVEL_OUTOF10: 8
PAINLEVEL_OUTOF10: 6
PAINLEVEL_OUTOF10: 8
PAINLEVEL_OUTOF10: 7
PAINLEVEL_OUTOF10: 6
PAINLEVEL_OUTOF10: 9
PAINLEVEL_OUTOF10: 8
PAINLEVEL_OUTOF10: 7

## 2019-10-05 LAB
BASOPHILS ABSOLUTE: 0 K/UL (ref 0–0.2)
BASOPHILS RELATIVE PERCENT: 0.1 %
EOSINOPHILS ABSOLUTE: 0 K/UL (ref 0–0.6)
EOSINOPHILS RELATIVE PERCENT: 0 %
HCT VFR BLD CALC: 27.8 % (ref 36–48)
HEMOGLOBIN: 9.1 G/DL (ref 12–16)
LYMPHOCYTES ABSOLUTE: 0.1 K/UL (ref 1–5.1)
LYMPHOCYTES RELATIVE PERCENT: 2.9 %
MCH RBC QN AUTO: 27.9 PG (ref 26–34)
MCHC RBC AUTO-ENTMCNC: 32.9 G/DL (ref 31–36)
MCV RBC AUTO: 84.7 FL (ref 80–100)
MONOCYTES ABSOLUTE: 0.3 K/UL (ref 0–1.3)
MONOCYTES RELATIVE PERCENT: 5.1 %
NEUTROPHILS ABSOLUTE: 4.7 K/UL (ref 1.7–7.7)
NEUTROPHILS RELATIVE PERCENT: 91.9 %
ORGANISM: ABNORMAL
PDW BLD-RTO: 18.7 % (ref 12.4–15.4)
PLATELET # BLD: 143 K/UL (ref 135–450)
PMV BLD AUTO: 6.8 FL (ref 5–10.5)
RBC # BLD: 3.28 M/UL (ref 4–5.2)
URINE CULTURE, ROUTINE: ABNORMAL
WBC # BLD: 5.1 K/UL (ref 4–11)

## 2019-10-05 PROCEDURE — 6360000002 HC RX W HCPCS: Performed by: INTERNAL MEDICINE

## 2019-10-05 PROCEDURE — 2580000003 HC RX 258: Performed by: NURSE PRACTITIONER

## 2019-10-05 PROCEDURE — 85025 COMPLETE CBC W/AUTO DIFF WBC: CPT

## 2019-10-05 PROCEDURE — 6370000000 HC RX 637 (ALT 250 FOR IP): Performed by: HOSPITALIST

## 2019-10-05 PROCEDURE — 94760 N-INVAS EAR/PLS OXIMETRY 1: CPT

## 2019-10-05 PROCEDURE — 2580000003 HC RX 258: Performed by: INTERNAL MEDICINE

## 2019-10-05 PROCEDURE — 6370000000 HC RX 637 (ALT 250 FOR IP): Performed by: NURSE PRACTITIONER

## 2019-10-05 PROCEDURE — 6370000000 HC RX 637 (ALT 250 FOR IP): Performed by: INTERNAL MEDICINE

## 2019-10-05 PROCEDURE — 2060000000 HC ICU INTERMEDIATE R&B

## 2019-10-05 RX ORDER — AMLODIPINE BESYLATE 5 MG/1
5 TABLET ORAL DAILY
Status: DISCONTINUED | OUTPATIENT
Start: 2019-10-05 | End: 2019-10-10 | Stop reason: HOSPADM

## 2019-10-05 RX ADMIN — FUROSEMIDE 20 MG: 20 TABLET ORAL at 18:13

## 2019-10-05 RX ADMIN — DEXAMETHASONE SODIUM PHOSPHATE 4 MG: 4 INJECTION, SOLUTION INTRA-ARTICULAR; INTRALESIONAL; INTRAMUSCULAR; INTRAVENOUS; SOFT TISSUE at 13:52

## 2019-10-05 RX ADMIN — NORTRIPTYLINE HYDROCHLORIDE 75 MG: 25 CAPSULE ORAL at 20:42

## 2019-10-05 RX ADMIN — AMLODIPINE BESYLATE 5 MG: 5 TABLET ORAL at 13:52

## 2019-10-05 RX ADMIN — FUROSEMIDE 20 MG: 20 TABLET ORAL at 09:34

## 2019-10-05 RX ADMIN — ASPIRIN 81 MG: 81 TABLET, COATED ORAL at 09:34

## 2019-10-05 RX ADMIN — DEXAMETHASONE SODIUM PHOSPHATE 4 MG: 4 INJECTION, SOLUTION INTRA-ARTICULAR; INTRALESIONAL; INTRAMUSCULAR; INTRAVENOUS; SOFT TISSUE at 05:55

## 2019-10-05 RX ADMIN — OXYCODONE HYDROCHLORIDE 90 MG: 30 TABLET ORAL at 02:01

## 2019-10-05 RX ADMIN — SODIUM CHLORIDE, PRESERVATIVE FREE 10 ML: 5 INJECTION INTRAVENOUS at 20:42

## 2019-10-05 RX ADMIN — OXYCODONE HYDROCHLORIDE 90 MG: 30 TABLET ORAL at 19:32

## 2019-10-05 RX ADMIN — DEXAMETHASONE SODIUM PHOSPHATE 4 MG: 4 INJECTION, SOLUTION INTRA-ARTICULAR; INTRALESIONAL; INTRAMUSCULAR; INTRAVENOUS; SOFT TISSUE at 20:43

## 2019-10-05 RX ADMIN — OXYCODONE HYDROCHLORIDE 90 MG: 30 TABLET ORAL at 09:37

## 2019-10-05 RX ADMIN — MIRTAZAPINE 15 MG: 15 TABLET, FILM COATED ORAL at 20:43

## 2019-10-05 RX ADMIN — METHADONE HYDROCHLORIDE 20 MG: 10 TABLET ORAL at 20:43

## 2019-10-05 RX ADMIN — METHADONE HYDROCHLORIDE 10 MG: 10 TABLET ORAL at 09:34

## 2019-10-05 RX ADMIN — OXYCODONE HYDROCHLORIDE 90 MG: 30 TABLET ORAL at 13:56

## 2019-10-05 RX ADMIN — HYDROMORPHONE HYDROCHLORIDE 1 MG: 1 INJECTION, SOLUTION INTRAMUSCULAR; INTRAVENOUS; SUBCUTANEOUS at 04:44

## 2019-10-05 RX ADMIN — CEFTRIAXONE 1 G: 1 INJECTION, POWDER, FOR SOLUTION INTRAMUSCULAR; INTRAVENOUS at 09:35

## 2019-10-05 RX ADMIN — ATORVASTATIN CALCIUM 20 MG: 20 TABLET, FILM COATED ORAL at 09:34

## 2019-10-05 RX ADMIN — SODIUM CHLORIDE, PRESERVATIVE FREE 10 ML: 5 INJECTION INTRAVENOUS at 09:39

## 2019-10-05 ASSESSMENT — PAIN DESCRIPTION - FREQUENCY
FREQUENCY: CONTINUOUS

## 2019-10-05 ASSESSMENT — PAIN DESCRIPTION - PROGRESSION
CLINICAL_PROGRESSION: NOT CHANGED

## 2019-10-05 ASSESSMENT — PAIN DESCRIPTION - LOCATION
LOCATION: BACK

## 2019-10-05 ASSESSMENT — PAIN DESCRIPTION - ORIENTATION
ORIENTATION: RIGHT;LOWER
ORIENTATION: RIGHT;LOWER

## 2019-10-05 ASSESSMENT — PAIN DESCRIPTION - PAIN TYPE
TYPE: CHRONIC PAIN

## 2019-10-05 ASSESSMENT — PAIN DESCRIPTION - ONSET
ONSET: ON-GOING

## 2019-10-05 ASSESSMENT — PAIN SCALES - GENERAL
PAINLEVEL_OUTOF10: 6
PAINLEVEL_OUTOF10: 7
PAINLEVEL_OUTOF10: 7
PAINLEVEL_OUTOF10: 0
PAINLEVEL_OUTOF10: 0
PAINLEVEL_OUTOF10: 8
PAINLEVEL_OUTOF10: 8
PAINLEVEL_OUTOF10: 6
PAINLEVEL_OUTOF10: 8
PAINLEVEL_OUTOF10: 7
PAINLEVEL_OUTOF10: 8
PAINLEVEL_OUTOF10: 1
PAINLEVEL_OUTOF10: 3
PAINLEVEL_OUTOF10: 0
PAINLEVEL_OUTOF10: 7
PAINLEVEL_OUTOF10: 8

## 2019-10-05 ASSESSMENT — PAIN DESCRIPTION - DESCRIPTORS
DESCRIPTORS: ACHING

## 2019-10-05 ASSESSMENT — PAIN DESCRIPTION - DIRECTION
RADIATING_TOWARDS: LEG
RADIATING_TOWARDS: LEG

## 2019-10-06 LAB
BASOPHILS ABSOLUTE: 0 K/UL (ref 0–0.2)
BASOPHILS RELATIVE PERCENT: 0.2 %
EOSINOPHILS ABSOLUTE: 0 K/UL (ref 0–0.6)
EOSINOPHILS RELATIVE PERCENT: 0 %
HCT VFR BLD CALC: 29.7 % (ref 36–48)
HEMOGLOBIN: 9.6 G/DL (ref 12–16)
LYMPHOCYTES ABSOLUTE: 0.2 K/UL (ref 1–5.1)
LYMPHOCYTES RELATIVE PERCENT: 2.6 %
MCH RBC QN AUTO: 27.7 PG (ref 26–34)
MCHC RBC AUTO-ENTMCNC: 32.5 G/DL (ref 31–36)
MCV RBC AUTO: 85.2 FL (ref 80–100)
MONOCYTES ABSOLUTE: 0.3 K/UL (ref 0–1.3)
MONOCYTES RELATIVE PERCENT: 5.4 %
NEUTROPHILS ABSOLUTE: 5.5 K/UL (ref 1.7–7.7)
NEUTROPHILS RELATIVE PERCENT: 91.8 %
PDW BLD-RTO: 18.6 % (ref 12.4–15.4)
PLATELET # BLD: 165 K/UL (ref 135–450)
PMV BLD AUTO: 6.7 FL (ref 5–10.5)
RBC # BLD: 3.48 M/UL (ref 4–5.2)
WBC # BLD: 6 K/UL (ref 4–11)

## 2019-10-06 PROCEDURE — 36591 DRAW BLOOD OFF VENOUS DEVICE: CPT

## 2019-10-06 PROCEDURE — 2580000003 HC RX 258: Performed by: NURSE PRACTITIONER

## 2019-10-06 PROCEDURE — 6370000000 HC RX 637 (ALT 250 FOR IP): Performed by: HOSPITALIST

## 2019-10-06 PROCEDURE — 6360000002 HC RX W HCPCS: Performed by: INTERNAL MEDICINE

## 2019-10-06 PROCEDURE — 2580000003 HC RX 258: Performed by: INTERNAL MEDICINE

## 2019-10-06 PROCEDURE — 6370000000 HC RX 637 (ALT 250 FOR IP): Performed by: INTERNAL MEDICINE

## 2019-10-06 PROCEDURE — 94760 N-INVAS EAR/PLS OXIMETRY 1: CPT

## 2019-10-06 PROCEDURE — 85025 COMPLETE CBC W/AUTO DIFF WBC: CPT

## 2019-10-06 PROCEDURE — 6370000000 HC RX 637 (ALT 250 FOR IP): Performed by: NURSE PRACTITIONER

## 2019-10-06 PROCEDURE — 2060000000 HC ICU INTERMEDIATE R&B

## 2019-10-06 RX ADMIN — OXYCODONE HYDROCHLORIDE 90 MG: 30 TABLET ORAL at 12:00

## 2019-10-06 RX ADMIN — NORTRIPTYLINE HYDROCHLORIDE 75 MG: 25 CAPSULE ORAL at 20:53

## 2019-10-06 RX ADMIN — CEFTRIAXONE 1 G: 1 INJECTION, POWDER, FOR SOLUTION INTRAMUSCULAR; INTRAVENOUS at 09:30

## 2019-10-06 RX ADMIN — SODIUM CHLORIDE, PRESERVATIVE FREE 10 ML: 5 INJECTION INTRAVENOUS at 09:30

## 2019-10-06 RX ADMIN — SODIUM CHLORIDE, PRESERVATIVE FREE 10 ML: 5 INJECTION INTRAVENOUS at 22:42

## 2019-10-06 RX ADMIN — ASPIRIN 81 MG: 81 TABLET, COATED ORAL at 07:20

## 2019-10-06 RX ADMIN — OXYCODONE HYDROCHLORIDE 90 MG: 30 TABLET ORAL at 01:02

## 2019-10-06 RX ADMIN — METHADONE HYDROCHLORIDE 10 MG: 10 TABLET ORAL at 07:20

## 2019-10-06 RX ADMIN — OXYCODONE HYDROCHLORIDE 90 MG: 30 TABLET ORAL at 16:24

## 2019-10-06 RX ADMIN — DEXAMETHASONE SODIUM PHOSPHATE 4 MG: 4 INJECTION, SOLUTION INTRA-ARTICULAR; INTRALESIONAL; INTRAMUSCULAR; INTRAVENOUS; SOFT TISSUE at 13:54

## 2019-10-06 RX ADMIN — AMLODIPINE BESYLATE 5 MG: 5 TABLET ORAL at 07:20

## 2019-10-06 RX ADMIN — OXYCODONE HYDROCHLORIDE 90 MG: 30 TABLET ORAL at 07:20

## 2019-10-06 RX ADMIN — OXYCODONE HYDROCHLORIDE 90 MG: 30 TABLET ORAL at 20:52

## 2019-10-06 RX ADMIN — DEXAMETHASONE SODIUM PHOSPHATE 4 MG: 4 INJECTION, SOLUTION INTRA-ARTICULAR; INTRALESIONAL; INTRAMUSCULAR; INTRAVENOUS; SOFT TISSUE at 21:01

## 2019-10-06 RX ADMIN — FUROSEMIDE 20 MG: 20 TABLET ORAL at 07:20

## 2019-10-06 RX ADMIN — ATORVASTATIN CALCIUM 20 MG: 20 TABLET, FILM COATED ORAL at 07:20

## 2019-10-06 RX ADMIN — METHADONE HYDROCHLORIDE 20 MG: 10 TABLET ORAL at 20:53

## 2019-10-06 RX ADMIN — FUROSEMIDE 20 MG: 20 TABLET ORAL at 16:24

## 2019-10-06 RX ADMIN — MIRTAZAPINE 15 MG: 15 TABLET, FILM COATED ORAL at 20:53

## 2019-10-06 RX ADMIN — DEXAMETHASONE SODIUM PHOSPHATE 4 MG: 4 INJECTION, SOLUTION INTRA-ARTICULAR; INTRALESIONAL; INTRAMUSCULAR; INTRAVENOUS; SOFT TISSUE at 05:31

## 2019-10-06 ASSESSMENT — PAIN DESCRIPTION - DESCRIPTORS
DESCRIPTORS: ACHING

## 2019-10-06 ASSESSMENT — PAIN DESCRIPTION - LOCATION
LOCATION: BACK

## 2019-10-06 ASSESSMENT — PAIN DESCRIPTION - FREQUENCY
FREQUENCY: CONTINUOUS

## 2019-10-06 ASSESSMENT — PAIN DESCRIPTION - ONSET
ONSET: ON-GOING

## 2019-10-06 ASSESSMENT — PAIN SCALES - GENERAL
PAINLEVEL_OUTOF10: 7
PAINLEVEL_OUTOF10: 7
PAINLEVEL_OUTOF10: 8
PAINLEVEL_OUTOF10: 7
PAINLEVEL_OUTOF10: 7
PAINLEVEL_OUTOF10: 0
PAINLEVEL_OUTOF10: 8
PAINLEVEL_OUTOF10: 2
PAINLEVEL_OUTOF10: 3
PAINLEVEL_OUTOF10: 2
PAINLEVEL_OUTOF10: 7
PAINLEVEL_OUTOF10: 0
PAINLEVEL_OUTOF10: 0

## 2019-10-06 ASSESSMENT — PAIN DESCRIPTION - DIRECTION
RADIATING_TOWARDS: LEG

## 2019-10-06 ASSESSMENT — PAIN - FUNCTIONAL ASSESSMENT
PAIN_FUNCTIONAL_ASSESSMENT: PREVENTS OR INTERFERES SOME ACTIVE ACTIVITIES AND ADLS

## 2019-10-06 ASSESSMENT — PAIN DESCRIPTION - ORIENTATION
ORIENTATION: RIGHT;LOWER

## 2019-10-06 ASSESSMENT — PAIN DESCRIPTION - PAIN TYPE
TYPE: CHRONIC PAIN

## 2019-10-06 ASSESSMENT — PAIN DESCRIPTION - PROGRESSION
CLINICAL_PROGRESSION: NOT CHANGED

## 2019-10-07 LAB
BASOPHILS ABSOLUTE: 0 K/UL (ref 0–0.2)
BASOPHILS RELATIVE PERCENT: 0.1 %
EOSINOPHILS ABSOLUTE: 0 K/UL (ref 0–0.6)
EOSINOPHILS RELATIVE PERCENT: 0 %
HCT VFR BLD CALC: 29.7 % (ref 36–48)
HEMOGLOBIN: 9.6 G/DL (ref 12–16)
LYMPHOCYTES ABSOLUTE: 0.2 K/UL (ref 1–5.1)
LYMPHOCYTES RELATIVE PERCENT: 2.8 %
MCH RBC QN AUTO: 27.6 PG (ref 26–34)
MCHC RBC AUTO-ENTMCNC: 32.2 G/DL (ref 31–36)
MCV RBC AUTO: 85.6 FL (ref 80–100)
MONOCYTES ABSOLUTE: 0.4 K/UL (ref 0–1.3)
MONOCYTES RELATIVE PERCENT: 6.7 %
NEUTROPHILS ABSOLUTE: 4.8 K/UL (ref 1.7–7.7)
NEUTROPHILS RELATIVE PERCENT: 90.4 %
PDW BLD-RTO: 18.3 % (ref 12.4–15.4)
PLATELET # BLD: 178 K/UL (ref 135–450)
PMV BLD AUTO: 6.7 FL (ref 5–10.5)
RBC # BLD: 3.47 M/UL (ref 4–5.2)
WBC # BLD: 5.4 K/UL (ref 4–11)

## 2019-10-07 PROCEDURE — 85025 COMPLETE CBC W/AUTO DIFF WBC: CPT

## 2019-10-07 PROCEDURE — 6360000002 HC RX W HCPCS: Performed by: INTERNAL MEDICINE

## 2019-10-07 PROCEDURE — 2580000003 HC RX 258: Performed by: NURSE PRACTITIONER

## 2019-10-07 PROCEDURE — 94760 N-INVAS EAR/PLS OXIMETRY 1: CPT

## 2019-10-07 PROCEDURE — 6370000000 HC RX 637 (ALT 250 FOR IP): Performed by: HOSPITALIST

## 2019-10-07 PROCEDURE — 2060000000 HC ICU INTERMEDIATE R&B

## 2019-10-07 PROCEDURE — 36591 DRAW BLOOD OFF VENOUS DEVICE: CPT

## 2019-10-07 PROCEDURE — 6370000000 HC RX 637 (ALT 250 FOR IP): Performed by: NURSE PRACTITIONER

## 2019-10-07 PROCEDURE — 2580000003 HC RX 258: Performed by: INTERNAL MEDICINE

## 2019-10-07 PROCEDURE — 6370000000 HC RX 637 (ALT 250 FOR IP): Performed by: INTERNAL MEDICINE

## 2019-10-07 RX ORDER — POLYETHYLENE GLYCOL 3350 17 G/17G
17 POWDER, FOR SOLUTION ORAL DAILY
Status: DISCONTINUED | OUTPATIENT
Start: 2019-10-07 | End: 2019-10-10 | Stop reason: HOSPADM

## 2019-10-07 RX ADMIN — ATORVASTATIN CALCIUM 20 MG: 20 TABLET, FILM COATED ORAL at 09:16

## 2019-10-07 RX ADMIN — OXYCODONE HYDROCHLORIDE 90 MG: 30 TABLET ORAL at 01:17

## 2019-10-07 RX ADMIN — SODIUM CHLORIDE, PRESERVATIVE FREE 10 ML: 5 INJECTION INTRAVENOUS at 22:29

## 2019-10-07 RX ADMIN — FUROSEMIDE 20 MG: 20 TABLET ORAL at 17:17

## 2019-10-07 RX ADMIN — FUROSEMIDE 20 MG: 20 TABLET ORAL at 09:16

## 2019-10-07 RX ADMIN — OXYCODONE HYDROCHLORIDE 90 MG: 30 TABLET ORAL at 05:39

## 2019-10-07 RX ADMIN — DEXAMETHASONE SODIUM PHOSPHATE 4 MG: 4 INJECTION, SOLUTION INTRA-ARTICULAR; INTRALESIONAL; INTRAMUSCULAR; INTRAVENOUS; SOFT TISSUE at 05:40

## 2019-10-07 RX ADMIN — METHADONE HYDROCHLORIDE 10 MG: 10 TABLET ORAL at 09:16

## 2019-10-07 RX ADMIN — NORTRIPTYLINE HYDROCHLORIDE 75 MG: 25 CAPSULE ORAL at 20:48

## 2019-10-07 RX ADMIN — CEFTRIAXONE 1 G: 1 INJECTION, POWDER, FOR SOLUTION INTRAMUSCULAR; INTRAVENOUS at 09:17

## 2019-10-07 RX ADMIN — DEXAMETHASONE SODIUM PHOSPHATE 4 MG: 4 INJECTION, SOLUTION INTRA-ARTICULAR; INTRALESIONAL; INTRAMUSCULAR; INTRAVENOUS; SOFT TISSUE at 13:52

## 2019-10-07 RX ADMIN — ASPIRIN 81 MG: 81 TABLET, COATED ORAL at 09:16

## 2019-10-07 RX ADMIN — SODIUM CHLORIDE, PRESERVATIVE FREE 10 ML: 5 INJECTION INTRAVENOUS at 09:19

## 2019-10-07 RX ADMIN — POLYETHYLENE GLYCOL 3350 17 G: 17 POWDER, FOR SOLUTION ORAL at 13:01

## 2019-10-07 RX ADMIN — METHADONE HYDROCHLORIDE 20 MG: 10 TABLET ORAL at 20:49

## 2019-10-07 RX ADMIN — AMLODIPINE BESYLATE 5 MG: 5 TABLET ORAL at 09:16

## 2019-10-07 RX ADMIN — DARBEPOETIN ALFA 150 MCG: 150 INJECTION, SOLUTION INTRAVENOUS; SUBCUTANEOUS at 09:40

## 2019-10-07 RX ADMIN — OXYCODONE HYDROCHLORIDE 90 MG: 30 TABLET ORAL at 18:10

## 2019-10-07 RX ADMIN — OXYCODONE HYDROCHLORIDE 90 MG: 30 TABLET ORAL at 22:19

## 2019-10-07 RX ADMIN — DEXAMETHASONE SODIUM PHOSPHATE 4 MG: 4 INJECTION, SOLUTION INTRA-ARTICULAR; INTRALESIONAL; INTRAMUSCULAR; INTRAVENOUS; SOFT TISSUE at 22:29

## 2019-10-07 RX ADMIN — MIRTAZAPINE 15 MG: 15 TABLET, FILM COATED ORAL at 20:48

## 2019-10-07 RX ADMIN — OXYCODONE HYDROCHLORIDE 90 MG: 30 TABLET ORAL at 09:54

## 2019-10-07 RX ADMIN — SENNOSIDES, DOCUSATE SODIUM 2 TABLET: 50; 8.6 TABLET, FILM COATED ORAL at 09:54

## 2019-10-07 RX ADMIN — OXYCODONE HYDROCHLORIDE 90 MG: 30 TABLET ORAL at 13:56

## 2019-10-07 ASSESSMENT — PAIN DESCRIPTION - FREQUENCY
FREQUENCY: CONTINUOUS

## 2019-10-07 ASSESSMENT — PAIN DESCRIPTION - DIRECTION
RADIATING_TOWARDS: LEG

## 2019-10-07 ASSESSMENT — PAIN SCALES - GENERAL
PAINLEVEL_OUTOF10: 6
PAINLEVEL_OUTOF10: 0
PAINLEVEL_OUTOF10: 8
PAINLEVEL_OUTOF10: 0
PAINLEVEL_OUTOF10: 6
PAINLEVEL_OUTOF10: 7
PAINLEVEL_OUTOF10: 4
PAINLEVEL_OUTOF10: 7
PAINLEVEL_OUTOF10: 8
PAINLEVEL_OUTOF10: 5
PAINLEVEL_OUTOF10: 6
PAINLEVEL_OUTOF10: 8
PAINLEVEL_OUTOF10: 6
PAINLEVEL_OUTOF10: 7
PAINLEVEL_OUTOF10: 6

## 2019-10-07 ASSESSMENT — PAIN DESCRIPTION - DESCRIPTORS
DESCRIPTORS: ACHING

## 2019-10-07 ASSESSMENT — PAIN DESCRIPTION - PAIN TYPE
TYPE: CHRONIC PAIN

## 2019-10-07 ASSESSMENT — PAIN DESCRIPTION - LOCATION
LOCATION: BACK

## 2019-10-07 ASSESSMENT — PAIN DESCRIPTION - ORIENTATION
ORIENTATION: LOWER;RIGHT
ORIENTATION: RIGHT;LOWER
ORIENTATION: LOWER;RIGHT
ORIENTATION: LOWER;RIGHT

## 2019-10-07 ASSESSMENT — PAIN - FUNCTIONAL ASSESSMENT
PAIN_FUNCTIONAL_ASSESSMENT: PREVENTS OR INTERFERES SOME ACTIVE ACTIVITIES AND ADLS

## 2019-10-07 ASSESSMENT — PAIN DESCRIPTION - PROGRESSION
CLINICAL_PROGRESSION: NOT CHANGED
CLINICAL_PROGRESSION: GRADUALLY IMPROVING

## 2019-10-07 ASSESSMENT — PAIN DESCRIPTION - ONSET
ONSET: ON-GOING

## 2019-10-08 LAB
ALBUMIN SERPL-MCNC: 3.3 G/DL (ref 3.4–5)
ANION GAP SERPL CALCULATED.3IONS-SCNC: 14 MMOL/L (ref 3–16)
BASOPHILS ABSOLUTE: 0 K/UL (ref 0–0.2)
BASOPHILS RELATIVE PERCENT: 0.5 %
BUN BLDV-MCNC: 40 MG/DL (ref 7–20)
CALCIUM SERPL-MCNC: 8.3 MG/DL (ref 8.3–10.6)
CHLORIDE BLD-SCNC: 99 MMOL/L (ref 99–110)
CO2: 21 MMOL/L (ref 21–32)
CREAT SERPL-MCNC: 1 MG/DL (ref 0.6–1.2)
EOSINOPHILS ABSOLUTE: 0 K/UL (ref 0–0.6)
EOSINOPHILS RELATIVE PERCENT: 0 %
GFR AFRICAN AMERICAN: >60
GFR NON-AFRICAN AMERICAN: 56
GLUCOSE BLD-MCNC: 134 MG/DL (ref 70–99)
HCT VFR BLD CALC: 29.2 % (ref 36–48)
HEMOGLOBIN: 9.5 G/DL (ref 12–16)
LYMPHOCYTES ABSOLUTE: 0.1 K/UL (ref 1–5.1)
LYMPHOCYTES RELATIVE PERCENT: 1.8 %
MCH RBC QN AUTO: 28 PG (ref 26–34)
MCHC RBC AUTO-ENTMCNC: 32.4 G/DL (ref 31–36)
MCV RBC AUTO: 86.4 FL (ref 80–100)
MONOCYTES ABSOLUTE: 0.3 K/UL (ref 0–1.3)
MONOCYTES RELATIVE PERCENT: 5.3 %
NEUTROPHILS ABSOLUTE: 4.4 K/UL (ref 1.7–7.7)
NEUTROPHILS RELATIVE PERCENT: 92.4 %
PDW BLD-RTO: 18.5 % (ref 12.4–15.4)
PHOSPHORUS: 2.5 MG/DL (ref 2.5–4.9)
PLATELET # BLD: 147 K/UL (ref 135–450)
PMV BLD AUTO: 6.6 FL (ref 5–10.5)
POTASSIUM SERPL-SCNC: 4.8 MMOL/L (ref 3.5–5.1)
RBC # BLD: 3.38 M/UL (ref 4–5.2)
SODIUM BLD-SCNC: 134 MMOL/L (ref 136–145)
WBC # BLD: 4.8 K/UL (ref 4–11)

## 2019-10-08 PROCEDURE — 2580000003 HC RX 258: Performed by: NURSE PRACTITIONER

## 2019-10-08 PROCEDURE — 80069 RENAL FUNCTION PANEL: CPT

## 2019-10-08 PROCEDURE — 6360000002 HC RX W HCPCS: Performed by: INTERNAL MEDICINE

## 2019-10-08 PROCEDURE — 6370000000 HC RX 637 (ALT 250 FOR IP): Performed by: INTERNAL MEDICINE

## 2019-10-08 PROCEDURE — 6370000000 HC RX 637 (ALT 250 FOR IP): Performed by: NURSE PRACTITIONER

## 2019-10-08 PROCEDURE — 6370000000 HC RX 637 (ALT 250 FOR IP): Performed by: HOSPITALIST

## 2019-10-08 PROCEDURE — 2060000000 HC ICU INTERMEDIATE R&B

## 2019-10-08 PROCEDURE — 2580000003 HC RX 258: Performed by: INTERNAL MEDICINE

## 2019-10-08 PROCEDURE — 85025 COMPLETE CBC W/AUTO DIFF WBC: CPT

## 2019-10-08 PROCEDURE — 94760 N-INVAS EAR/PLS OXIMETRY 1: CPT

## 2019-10-08 RX ADMIN — OXYCODONE HYDROCHLORIDE 90 MG: 30 TABLET ORAL at 07:43

## 2019-10-08 RX ADMIN — AMLODIPINE BESYLATE 5 MG: 5 TABLET ORAL at 08:42

## 2019-10-08 RX ADMIN — ASPIRIN 81 MG: 81 TABLET, COATED ORAL at 08:42

## 2019-10-08 RX ADMIN — DEXAMETHASONE SODIUM PHOSPHATE 4 MG: 4 INJECTION, SOLUTION INTRA-ARTICULAR; INTRALESIONAL; INTRAMUSCULAR; INTRAVENOUS; SOFT TISSUE at 20:49

## 2019-10-08 RX ADMIN — MIRTAZAPINE 15 MG: 15 TABLET, FILM COATED ORAL at 20:48

## 2019-10-08 RX ADMIN — FUROSEMIDE 20 MG: 20 TABLET ORAL at 08:42

## 2019-10-08 RX ADMIN — OXYCODONE HYDROCHLORIDE 90 MG: 30 TABLET ORAL at 12:25

## 2019-10-08 RX ADMIN — OXYCODONE HYDROCHLORIDE 90 MG: 30 TABLET ORAL at 03:33

## 2019-10-08 RX ADMIN — NORTRIPTYLINE HYDROCHLORIDE 75 MG: 25 CAPSULE ORAL at 20:48

## 2019-10-08 RX ADMIN — FUROSEMIDE 20 MG: 20 TABLET ORAL at 17:03

## 2019-10-08 RX ADMIN — DEXAMETHASONE SODIUM PHOSPHATE 4 MG: 4 INJECTION, SOLUTION INTRA-ARTICULAR; INTRALESIONAL; INTRAMUSCULAR; INTRAVENOUS; SOFT TISSUE at 14:33

## 2019-10-08 RX ADMIN — OXYCODONE HYDROCHLORIDE 90 MG: 30 TABLET ORAL at 17:03

## 2019-10-08 RX ADMIN — CEFTRIAXONE 1 G: 1 INJECTION, POWDER, FOR SOLUTION INTRAMUSCULAR; INTRAVENOUS at 08:42

## 2019-10-08 RX ADMIN — ATORVASTATIN CALCIUM 20 MG: 20 TABLET, FILM COATED ORAL at 08:41

## 2019-10-08 RX ADMIN — SODIUM CHLORIDE, PRESERVATIVE FREE 10 ML: 5 INJECTION INTRAVENOUS at 22:02

## 2019-10-08 RX ADMIN — METHADONE HYDROCHLORIDE 20 MG: 10 TABLET ORAL at 20:49

## 2019-10-08 RX ADMIN — SODIUM CHLORIDE, PRESERVATIVE FREE 10 ML: 5 INJECTION INTRAVENOUS at 08:43

## 2019-10-08 RX ADMIN — DEXAMETHASONE SODIUM PHOSPHATE 4 MG: 4 INJECTION, SOLUTION INTRA-ARTICULAR; INTRALESIONAL; INTRAMUSCULAR; INTRAVENOUS; SOFT TISSUE at 06:23

## 2019-10-08 RX ADMIN — OXYCODONE HYDROCHLORIDE 90 MG: 30 TABLET ORAL at 20:49

## 2019-10-08 RX ADMIN — METHADONE HYDROCHLORIDE 10 MG: 10 TABLET ORAL at 08:42

## 2019-10-08 ASSESSMENT — PAIN DESCRIPTION - PAIN TYPE
TYPE: CHRONIC PAIN

## 2019-10-08 ASSESSMENT — PAIN DESCRIPTION - LOCATION
LOCATION: BACK

## 2019-10-08 ASSESSMENT — PAIN DESCRIPTION - DESCRIPTORS
DESCRIPTORS: ACHING

## 2019-10-08 ASSESSMENT — PAIN SCALES - GENERAL
PAINLEVEL_OUTOF10: 7
PAINLEVEL_OUTOF10: 0
PAINLEVEL_OUTOF10: 0
PAINLEVEL_OUTOF10: 8
PAINLEVEL_OUTOF10: 7
PAINLEVEL_OUTOF10: 9
PAINLEVEL_OUTOF10: 6
PAINLEVEL_OUTOF10: 7
PAINLEVEL_OUTOF10: 0

## 2019-10-08 ASSESSMENT — PAIN DESCRIPTION - PROGRESSION
CLINICAL_PROGRESSION: NOT CHANGED
CLINICAL_PROGRESSION: GRADUALLY IMPROVING
CLINICAL_PROGRESSION: GRADUALLY WORSENING

## 2019-10-08 ASSESSMENT — PAIN DESCRIPTION - ONSET
ONSET: ON-GOING

## 2019-10-08 ASSESSMENT — PAIN DESCRIPTION - FREQUENCY
FREQUENCY: CONTINUOUS

## 2019-10-08 ASSESSMENT — PAIN DESCRIPTION - ORIENTATION
ORIENTATION: LOWER
ORIENTATION: LOWER;RIGHT
ORIENTATION: RIGHT;LOWER
ORIENTATION: RIGHT;LOWER
ORIENTATION: LOWER

## 2019-10-08 ASSESSMENT — PAIN DESCRIPTION - DIRECTION
RADIATING_TOWARDS: RIGHT LEG

## 2019-10-09 LAB
BASOPHILS ABSOLUTE: 0 K/UL (ref 0–0.2)
BASOPHILS RELATIVE PERCENT: 0.2 %
EOSINOPHILS ABSOLUTE: 0 K/UL (ref 0–0.6)
EOSINOPHILS RELATIVE PERCENT: 0 %
HCT VFR BLD CALC: 30.5 % (ref 36–48)
HEMOGLOBIN: 9.9 G/DL (ref 12–16)
LYMPHOCYTES ABSOLUTE: 0.1 K/UL (ref 1–5.1)
LYMPHOCYTES RELATIVE PERCENT: 1.5 %
MCH RBC QN AUTO: 27.8 PG (ref 26–34)
MCHC RBC AUTO-ENTMCNC: 32.4 G/DL (ref 31–36)
MCV RBC AUTO: 85.7 FL (ref 80–100)
MONOCYTES ABSOLUTE: 0.3 K/UL (ref 0–1.3)
MONOCYTES RELATIVE PERCENT: 5.7 %
NEUTROPHILS ABSOLUTE: 5.2 K/UL (ref 1.7–7.7)
NEUTROPHILS RELATIVE PERCENT: 92.6 %
PDW BLD-RTO: 18.4 % (ref 12.4–15.4)
PLATELET # BLD: 161 K/UL (ref 135–450)
PMV BLD AUTO: 6.8 FL (ref 5–10.5)
RBC # BLD: 3.56 M/UL (ref 4–5.2)
WBC # BLD: 5.6 K/UL (ref 4–11)

## 2019-10-09 PROCEDURE — 6360000002 HC RX W HCPCS: Performed by: INTERNAL MEDICINE

## 2019-10-09 PROCEDURE — 6370000000 HC RX 637 (ALT 250 FOR IP): Performed by: NURSE PRACTITIONER

## 2019-10-09 PROCEDURE — 94760 N-INVAS EAR/PLS OXIMETRY 1: CPT

## 2019-10-09 PROCEDURE — 2580000003 HC RX 258: Performed by: NURSE PRACTITIONER

## 2019-10-09 PROCEDURE — 85025 COMPLETE CBC W/AUTO DIFF WBC: CPT

## 2019-10-09 PROCEDURE — 6370000000 HC RX 637 (ALT 250 FOR IP): Performed by: HOSPITALIST

## 2019-10-09 PROCEDURE — 2580000003 HC RX 258: Performed by: INTERNAL MEDICINE

## 2019-10-09 PROCEDURE — 2060000000 HC ICU INTERMEDIATE R&B

## 2019-10-09 PROCEDURE — 6370000000 HC RX 637 (ALT 250 FOR IP): Performed by: INTERNAL MEDICINE

## 2019-10-09 RX ADMIN — DEXAMETHASONE SODIUM PHOSPHATE 4 MG: 4 INJECTION, SOLUTION INTRA-ARTICULAR; INTRALESIONAL; INTRAMUSCULAR; INTRAVENOUS; SOFT TISSUE at 14:30

## 2019-10-09 RX ADMIN — OXYCODONE HYDROCHLORIDE 90 MG: 30 TABLET ORAL at 12:01

## 2019-10-09 RX ADMIN — OXYCODONE HYDROCHLORIDE 90 MG: 30 TABLET ORAL at 16:20

## 2019-10-09 RX ADMIN — METHADONE HYDROCHLORIDE 20 MG: 10 TABLET ORAL at 20:14

## 2019-10-09 RX ADMIN — POLYETHYLENE GLYCOL 3350 17 G: 17 POWDER, FOR SOLUTION ORAL at 10:19

## 2019-10-09 RX ADMIN — AMLODIPINE BESYLATE 5 MG: 5 TABLET ORAL at 10:11

## 2019-10-09 RX ADMIN — DEXAMETHASONE SODIUM PHOSPHATE 4 MG: 4 INJECTION, SOLUTION INTRA-ARTICULAR; INTRALESIONAL; INTRAMUSCULAR; INTRAVENOUS; SOFT TISSUE at 20:15

## 2019-10-09 RX ADMIN — DEXAMETHASONE SODIUM PHOSPHATE 4 MG: 4 INJECTION, SOLUTION INTRA-ARTICULAR; INTRALESIONAL; INTRAMUSCULAR; INTRAVENOUS; SOFT TISSUE at 06:30

## 2019-10-09 RX ADMIN — OXYCODONE HYDROCHLORIDE 90 MG: 30 TABLET ORAL at 06:57

## 2019-10-09 RX ADMIN — NORTRIPTYLINE HYDROCHLORIDE 75 MG: 25 CAPSULE ORAL at 20:14

## 2019-10-09 RX ADMIN — ATORVASTATIN CALCIUM 20 MG: 20 TABLET, FILM COATED ORAL at 10:11

## 2019-10-09 RX ADMIN — CEFTRIAXONE 1 G: 1 INJECTION, POWDER, FOR SOLUTION INTRAMUSCULAR; INTRAVENOUS at 10:11

## 2019-10-09 RX ADMIN — MIRTAZAPINE 15 MG: 15 TABLET, FILM COATED ORAL at 20:15

## 2019-10-09 RX ADMIN — METHADONE HYDROCHLORIDE 10 MG: 10 TABLET ORAL at 10:11

## 2019-10-09 RX ADMIN — FUROSEMIDE 20 MG: 20 TABLET ORAL at 17:10

## 2019-10-09 RX ADMIN — SODIUM CHLORIDE, PRESERVATIVE FREE 10 ML: 5 INJECTION INTRAVENOUS at 21:34

## 2019-10-09 RX ADMIN — FUROSEMIDE 20 MG: 20 TABLET ORAL at 10:12

## 2019-10-09 RX ADMIN — SODIUM CHLORIDE, PRESERVATIVE FREE 10 ML: 5 INJECTION INTRAVENOUS at 10:12

## 2019-10-09 RX ADMIN — ASPIRIN 81 MG: 81 TABLET, COATED ORAL at 10:11

## 2019-10-09 RX ADMIN — OXYCODONE HYDROCHLORIDE 90 MG: 30 TABLET ORAL at 20:15

## 2019-10-09 RX ADMIN — OXYCODONE HYDROCHLORIDE 90 MG: 30 TABLET ORAL at 01:14

## 2019-10-09 ASSESSMENT — PAIN DESCRIPTION - ONSET
ONSET: ON-GOING

## 2019-10-09 ASSESSMENT — PAIN SCALES - GENERAL
PAINLEVEL_OUTOF10: 0
PAINLEVEL_OUTOF10: 6
PAINLEVEL_OUTOF10: 7
PAINLEVEL_OUTOF10: 0
PAINLEVEL_OUTOF10: 6
PAINLEVEL_OUTOF10: 7
PAINLEVEL_OUTOF10: 6
PAINLEVEL_OUTOF10: 8
PAINLEVEL_OUTOF10: 7
PAINLEVEL_OUTOF10: 8

## 2019-10-09 ASSESSMENT — PAIN DESCRIPTION - FREQUENCY
FREQUENCY: CONTINUOUS

## 2019-10-09 ASSESSMENT — PAIN - FUNCTIONAL ASSESSMENT
PAIN_FUNCTIONAL_ASSESSMENT: PREVENTS OR INTERFERES SOME ACTIVE ACTIVITIES AND ADLS

## 2019-10-09 ASSESSMENT — PAIN DESCRIPTION - DESCRIPTORS
DESCRIPTORS: ACHING

## 2019-10-09 ASSESSMENT — PAIN DESCRIPTION - PAIN TYPE
TYPE: CHRONIC PAIN

## 2019-10-09 ASSESSMENT — PAIN DESCRIPTION - PROGRESSION
CLINICAL_PROGRESSION: GRADUALLY WORSENING
CLINICAL_PROGRESSION: GRADUALLY IMPROVING

## 2019-10-09 ASSESSMENT — PAIN DESCRIPTION - LOCATION
LOCATION: BACK
LOCATION: LEG
LOCATION: LEG
LOCATION: BACK

## 2019-10-09 ASSESSMENT — PAIN DESCRIPTION - ORIENTATION
ORIENTATION: LOWER
ORIENTATION: RIGHT;LOWER

## 2019-10-09 ASSESSMENT — PAIN DESCRIPTION - DIRECTION
RADIATING_TOWARDS: RIGHT LEG

## 2019-10-10 VITALS
BODY MASS INDEX: 20.42 KG/M2 | SYSTOLIC BLOOD PRESSURE: 145 MMHG | TEMPERATURE: 97.5 F | RESPIRATION RATE: 16 BRPM | OXYGEN SATURATION: 97 % | WEIGHT: 122.58 LBS | HEART RATE: 81 BPM | HEIGHT: 65 IN | DIASTOLIC BLOOD PRESSURE: 75 MMHG

## 2019-10-10 PROBLEM — N39.0 UTI (URINARY TRACT INFECTION): Status: ACTIVE | Noted: 2019-10-10

## 2019-10-10 LAB
BASOPHILS ABSOLUTE: 0 K/UL (ref 0–0.2)
BASOPHILS RELATIVE PERCENT: 0.6 %
EOSINOPHILS ABSOLUTE: 0 K/UL (ref 0–0.6)
EOSINOPHILS RELATIVE PERCENT: 0 %
HCT VFR BLD CALC: 29.3 % (ref 36–48)
HEMOGLOBIN: 9.6 G/DL (ref 12–16)
LYMPHOCYTES ABSOLUTE: 0.1 K/UL (ref 1–5.1)
LYMPHOCYTES RELATIVE PERCENT: 1.2 %
MCH RBC QN AUTO: 28.1 PG (ref 26–34)
MCHC RBC AUTO-ENTMCNC: 32.9 G/DL (ref 31–36)
MCV RBC AUTO: 85.5 FL (ref 80–100)
MONOCYTES ABSOLUTE: 0.3 K/UL (ref 0–1.3)
MONOCYTES RELATIVE PERCENT: 5.3 %
NEUTROPHILS ABSOLUTE: 5.2 K/UL (ref 1.7–7.7)
NEUTROPHILS RELATIVE PERCENT: 92.9 %
PDW BLD-RTO: 18.7 % (ref 12.4–15.4)
PLATELET # BLD: 171 K/UL (ref 135–450)
PMV BLD AUTO: 6.8 FL (ref 5–10.5)
RBC # BLD: 3.42 M/UL (ref 4–5.2)
WBC # BLD: 5.6 K/UL (ref 4–11)

## 2019-10-10 PROCEDURE — 36591 DRAW BLOOD OFF VENOUS DEVICE: CPT

## 2019-10-10 PROCEDURE — 6360000002 HC RX W HCPCS: Performed by: INTERNAL MEDICINE

## 2019-10-10 PROCEDURE — 6370000000 HC RX 637 (ALT 250 FOR IP): Performed by: NURSE PRACTITIONER

## 2019-10-10 PROCEDURE — 2580000003 HC RX 258: Performed by: INTERNAL MEDICINE

## 2019-10-10 PROCEDURE — 6370000000 HC RX 637 (ALT 250 FOR IP): Performed by: HOSPITALIST

## 2019-10-10 PROCEDURE — 85025 COMPLETE CBC W/AUTO DIFF WBC: CPT

## 2019-10-10 PROCEDURE — 6370000000 HC RX 637 (ALT 250 FOR IP): Performed by: INTERNAL MEDICINE

## 2019-10-10 PROCEDURE — 94760 N-INVAS EAR/PLS OXIMETRY 1: CPT

## 2019-10-10 PROCEDURE — 2580000003 HC RX 258: Performed by: NURSE PRACTITIONER

## 2019-10-10 RX ORDER — FENTANYL 100 UG/H
1 PATCH TRANSDERMAL
Qty: 3 PATCH | Refills: 0 | Status: SHIPPED | OUTPATIENT
Start: 2019-10-10 | End: 2019-10-17

## 2019-10-10 RX ORDER — OXYCODONE HYDROCHLORIDE 30 MG/1
30 TABLET ORAL EVERY 4 HOURS PRN
Qty: 126 TABLET | Refills: 0 | Status: SHIPPED | OUTPATIENT
Start: 2019-10-10 | End: 2019-10-17

## 2019-10-10 RX ORDER — METHADONE HYDROCHLORIDE 10 MG/1
10 TABLET ORAL DAILY
Qty: 7 TABLET | Refills: 0 | Status: SHIPPED | OUTPATIENT
Start: 2019-10-11 | End: 2019-10-18

## 2019-10-10 RX ORDER — AMLODIPINE BESYLATE 5 MG/1
5 TABLET ORAL DAILY
Qty: 30 TABLET | Refills: 3 | Status: SHIPPED | OUTPATIENT
Start: 2019-10-11

## 2019-10-10 RX ORDER — METHADONE HYDROCHLORIDE 10 MG/1
20 TABLET ORAL NIGHTLY
Qty: 7 TABLET | Refills: 0 | Status: SHIPPED | OUTPATIENT
Start: 2019-10-10 | End: 2019-10-17

## 2019-10-10 RX ADMIN — OXYCODONE HYDROCHLORIDE 90 MG: 30 TABLET ORAL at 05:25

## 2019-10-10 RX ADMIN — OXYCODONE HYDROCHLORIDE 90 MG: 30 TABLET ORAL at 01:29

## 2019-10-10 RX ADMIN — DEXAMETHASONE SODIUM PHOSPHATE 4 MG: 4 INJECTION, SOLUTION INTRA-ARTICULAR; INTRALESIONAL; INTRAMUSCULAR; INTRAVENOUS; SOFT TISSUE at 05:26

## 2019-10-10 RX ADMIN — ASPIRIN 81 MG: 81 TABLET, COATED ORAL at 08:32

## 2019-10-10 RX ADMIN — FUROSEMIDE 20 MG: 20 TABLET ORAL at 08:32

## 2019-10-10 RX ADMIN — OXYCODONE HYDROCHLORIDE 90 MG: 30 TABLET ORAL at 13:56

## 2019-10-10 RX ADMIN — ATORVASTATIN CALCIUM 20 MG: 20 TABLET, FILM COATED ORAL at 08:32

## 2019-10-10 RX ADMIN — CEFTRIAXONE 1 G: 1 INJECTION, POWDER, FOR SOLUTION INTRAMUSCULAR; INTRAVENOUS at 08:32

## 2019-10-10 RX ADMIN — SODIUM CHLORIDE, PRESERVATIVE FREE 10 ML: 5 INJECTION INTRAVENOUS at 08:34

## 2019-10-10 RX ADMIN — METHADONE HYDROCHLORIDE 10 MG: 10 TABLET ORAL at 08:32

## 2019-10-10 RX ADMIN — AMLODIPINE BESYLATE 5 MG: 5 TABLET ORAL at 08:32

## 2019-10-10 RX ADMIN — OXYCODONE HYDROCHLORIDE 90 MG: 30 TABLET ORAL at 09:53

## 2019-10-10 ASSESSMENT — PAIN DESCRIPTION - ONSET
ONSET: ON-GOING

## 2019-10-10 ASSESSMENT — PAIN - FUNCTIONAL ASSESSMENT
PAIN_FUNCTIONAL_ASSESSMENT: PREVENTS OR INTERFERES SOME ACTIVE ACTIVITIES AND ADLS

## 2019-10-10 ASSESSMENT — PAIN DESCRIPTION - LOCATION
LOCATION: LEG
LOCATION: BACK

## 2019-10-10 ASSESSMENT — PAIN DESCRIPTION - FREQUENCY
FREQUENCY: CONTINUOUS

## 2019-10-10 ASSESSMENT — PAIN SCALES - GENERAL
PAINLEVEL_OUTOF10: 8
PAINLEVEL_OUTOF10: 7
PAINLEVEL_OUTOF10: 8
PAINLEVEL_OUTOF10: 0
PAINLEVEL_OUTOF10: 0
PAINLEVEL_OUTOF10: 7
PAINLEVEL_OUTOF10: 8
PAINLEVEL_OUTOF10: 0
PAINLEVEL_OUTOF10: 8

## 2019-10-10 ASSESSMENT — PAIN DESCRIPTION - ORIENTATION
ORIENTATION: LOWER
ORIENTATION: LOWER
ORIENTATION: LOWER;RIGHT
ORIENTATION: LOWER

## 2019-10-10 ASSESSMENT — PAIN DESCRIPTION - DIRECTION: RADIATING_TOWARDS: RIGHT LEG

## 2019-10-10 ASSESSMENT — PAIN DESCRIPTION - PAIN TYPE
TYPE: CHRONIC PAIN

## 2019-10-10 ASSESSMENT — PAIN DESCRIPTION - PROGRESSION
CLINICAL_PROGRESSION: GRADUALLY WORSENING
CLINICAL_PROGRESSION: GRADUALLY IMPROVING
CLINICAL_PROGRESSION: GRADUALLY WORSENING
CLINICAL_PROGRESSION: GRADUALLY IMPROVING
CLINICAL_PROGRESSION: GRADUALLY WORSENING

## 2019-10-10 ASSESSMENT — PAIN DESCRIPTION - DESCRIPTORS
DESCRIPTORS: ACHING
DESCRIPTORS: ACHING;DISCOMFORT
DESCRIPTORS: ACHING;DISCOMFORT
DESCRIPTORS: ACHING

## 2019-10-11 ENCOUNTER — CARE COORDINATION (OUTPATIENT)
Dept: CASE MANAGEMENT | Age: 64
End: 2019-10-11

## 2019-10-12 ENCOUNTER — CARE COORDINATION (OUTPATIENT)
Dept: CASE MANAGEMENT | Age: 64
End: 2019-10-12

## 2019-10-14 ENCOUNTER — CARE COORDINATION (OUTPATIENT)
Dept: CASE MANAGEMENT | Age: 64
End: 2019-10-14

## 2019-11-04 ENCOUNTER — HOSPITAL ENCOUNTER (OUTPATIENT)
Dept: INFUSION THERAPY | Age: 64
Setting detail: INFUSION SERIES
Discharge: HOME | End: 2019-11-04
Payer: MEDICARE

## 2019-11-04 VITALS
DIASTOLIC BLOOD PRESSURE: 80 MMHG | HEART RATE: 91 BPM | WEIGHT: 121 LBS | SYSTOLIC BLOOD PRESSURE: 152 MMHG | OXYGEN SATURATION: 98 % | HEIGHT: 65 IN | TEMPERATURE: 97.8 F | RESPIRATION RATE: 17 BRPM | BODY MASS INDEX: 20.16 KG/M2

## 2019-11-04 LAB
ABO/RH: NORMAL
ANTIBODY SCREEN: NORMAL

## 2019-11-04 PROCEDURE — 86900 BLOOD TYPING SEROLOGIC ABO: CPT

## 2019-11-04 PROCEDURE — 86923 COMPATIBILITY TEST ELECTRIC: CPT

## 2019-11-04 PROCEDURE — 86850 RBC ANTIBODY SCREEN: CPT

## 2019-11-04 PROCEDURE — 2580000003 HC RX 258: Performed by: INTERNAL MEDICINE

## 2019-11-04 PROCEDURE — P9016 RBC LEUKOCYTES REDUCED: HCPCS

## 2019-11-04 PROCEDURE — 36430 TRANSFUSION BLD/BLD COMPNT: CPT

## 2019-11-04 PROCEDURE — 36415 COLL VENOUS BLD VENIPUNCTURE: CPT

## 2019-11-04 PROCEDURE — 86901 BLOOD TYPING SEROLOGIC RH(D): CPT

## 2019-11-04 RX ORDER — SODIUM CHLORIDE 9 MG/ML
INJECTION, SOLUTION INTRAVENOUS
Status: DISCONTINUED
Start: 2019-11-04 | End: 2019-11-05 | Stop reason: HOSPADM

## 2019-11-04 RX ORDER — SODIUM CHLORIDE 0.9 % (FLUSH) 0.9 %
10 SYRINGE (ML) INJECTION PRN
Status: DISCONTINUED | OUTPATIENT
Start: 2019-11-04 | End: 2019-11-05 | Stop reason: HOSPADM

## 2019-11-04 RX ORDER — 0.9 % SODIUM CHLORIDE 0.9 %
250 INTRAVENOUS SOLUTION INTRAVENOUS ONCE
Status: DISCONTINUED | OUTPATIENT
Start: 2019-11-04 | End: 2019-11-05 | Stop reason: HOSPADM

## 2019-11-04 RX ADMIN — Medication 30 ML: at 11:46

## 2019-11-04 ASSESSMENT — PAIN SCALES - GENERAL: PAINLEVEL_OUTOF10: 0

## 2019-11-04 NOTE — PROGRESS NOTES
IMPORTANT INFORMATION FOR AFTER A BLOOD TRANSFUSION:    The blood product you have received has been carefully matched with your own blood to ensure its safety. Even with this process, occasionally, reactions to the transfused blood do occur and cannot be avoided. Most of these reactions are mild, but all reactions should be reported to your physician immediately for possible treatment. Some of the symptoms indicating a possible reaction to blood transfusions are:     Itching, rash or hives   Fever and/or chills   Headache   Difficulty breathing   Sudden onset of a cough   Backache   Bloody vomit or bloody diarrhea   Passing red/blood tinged urine or black urine   Jaundice - a yellowing of the skin or whites of the eyes.  Dizziness   Confusion or change in mental condition    Most blood transfusion reactions occur during or just after the transfusion but, rarely, symptoms may occur 1 to 2 weeks following a transfusion. If you experience any of the listed symptoms within this time period, call your physician immediately for instructions and remind him/her that you have received a blood transfusion. Outpatient [address] Access    NAME:  Zena Figueroa OF BIRTH:  1955  MEDICAL RECORD NUMBER:  [id number]  DATE:  11/4/2019    Patient arrived to [facility]   [] per wheelchair   [x] ambulatory     Port Site Location:  right chest  Port Site:  Redness: No  Bruising: No   Edema: No  Pain: No     Port Site cleansed with:  Chloroprep Scrub for 30 seconds and air dried? Yes     Port Accessed with: [address] non coring needle using sterile technique. Blood return obtained: Yes  Flushed with 10 ml Normal Saline using push-pause method. Port flushes without resistance. Response to treatment:  Well tolerated by patient.  [facility] [facility] Access for Labs    NAME:  Alfred Jones Waymon Collet OF BIRTH:  1955  MEDICAL RECORD NUMBER:  [id number]  DATE:  11/4/2019    Patient arrived to [place] [address]   [] per wheelchair   [x] ambulatory     Port Site Location:  right chest    Port Site:  Redness: No  Bruising: No   Edema: No  Pain: No     Port Site cleansed with:  Chloroprep Scrub for 30 seconds and air dried? Yes  Port Accessed with: 19 Gauge 3/4 inch Power Port needle using sterile technique. Blood return obtained: Yes    Labs:  Blood wasted: 10 ml  Labs drawn using a Vacutainer/Syringe: Yes  Flushed with 20 ml Normal Saline using push-pause method. Port flushes without resistance. Port Deaccessed:  No: here for blood transfusion    Response to treatment:  Well tolerated by patient. Education:    Indicates understanding  Outpatient [address] [place] [facility]    Blood Transfusion  Patient to room ambulatory with walker for blood transfusion. Patient states she was in hospital fr 5 days with a UTI and for radiation treatments. Also had 2 units of blood transfused while in hospital,  NAME:  Glyn Gosselin OF BIRTH:  1955  MEDICAL RECORD NUMBER:  [id number]  DATE:  11/4/2019     Patient arrived to [place] [address]   [] per wheelchair   [x] ambulatory         Consent Obtained: Yes  Is this the patient's first Transfusion? No    Did the patient experience any adverse reactions to previous Transfusion?  No  Patient Active Problem List   Diagnosis    Adjustment reaction with prolonged depressive reaction    Asthma    Radiation proctitis    Essential hypertension    Colostomy status ([address]    Cervical cancer ([address]    Proctodynia    Malignant neoplasm of sigmoid colon ([address]    Tobacco dependence    Asymptomatic gallstones    Metastasis to liver (HCC)    Insomnia    Urinary incontinence    Vaginal discharge    History of radiation therapy    Metastatic adenocarcinoma to lung (HCC)    Chemotherapy-induced neuropathy ([address]    Constipation due to pain medication    Rectovaginal fistula    Vesico-vaginal fistula    Cancer associated pain    Nephrostomy status (HCC)    Chronic kidney disease, stage III (moderate) (HCC)    Mild protein-calorie malnutrition (HCC)    Right leg claudication (HCC)    Right iliac artery stenosis (HCC)    History of hypertension resolved with weight loss    Hypokalemia    Amputated toe of right foot (HCC) 4th toe due to ischemia    Closed fracture of neck of left femur (HCC)    Closed displaced fracture of left femoral neck (HCC)    Tachycardia    Hip fracture, left, closed, with routine healing, subsequent encounter    Arthritis of left knee    Lymphedema of both lower extremities    Back pain    Anemia    Severe malnutrition (HCC)    UTI (urinary tract infection)     Patient with Bone Marrow Suppression due to chemotherapy? Yes    Patient with history of GI bleeding? No  Patient with history of CHF? No  Patient with history of COPD?  No    Hemoglobin/Hematocrit:    Lab Results   Component Value Date    HGB 9.6 10/10/2019    HCT 29.3 10/10/2019       Special Transfusion Products Requested: {EXAM; YES/NO:77496::\"No\"}  Blood was:  []  Irradiated    [] Washed    [] Other    Is the patient experiencing any:  Fatigue: yes  []   None  []   Increase over baseline but not altering normal activities  []   Moderate of causing difficulty performing some activities  [x]   Severe or loss of ability to perform some activities  []   Bedridden or disabling    Dizziness or Lightheadedness: no  [x]   None  []   No Interference  []   Interferes with functioning but not activities of daily living  []   Interferes with daily activies  []   Bedridden or disabling    Shortness of Breath: yes  []   None   [x]   Dyspneic on exertion   []   Dyspnea with normal activities  []   Dyspnea at rest    Breath Sounds: No increased work of breathing, Breath sounds clear to auscultation bilaterally and Good air exchange    Edema: pitting Location of edema: both lower extremities and hips. Tachycardia: No    Heart Palpitations: No      Chest Pain: No    Premedicated:  [] Tylenol 325 mg oral  [] Tylenol 650 mg oral    [] Benadryl 25 mg oral   [] Benadryl 50 mg oral  [] Other    Administered Blood via: [] Peripheral access    [] PICC access    [x] Port access    Numbers of units transfused 1 over 2 hours    Monitoring of vital signs and rate changes are documented in flow sheets.        Response to treatment:  [address] [place] [address],[address] TOLERATED:[id number]}    Education:    {[id number]::\"SGOAMHPTXE understanding\",\"Demonstrated understanding\",\"No evidence of learning\",\"Refused teaching\",\"N/A\",\"***\"}      Electronically signed by Elvie Roberson RN on 11/4/2019 at 11:49 AM                          Electronically signed by Elvie Roberson RN on 11/4/2019 at 11:47 AM                Electronically signed by Elvie Roberson RN on 11/4/2019 at 11:46 AM

## 2019-11-05 ENCOUNTER — HOSPITAL ENCOUNTER (OUTPATIENT)
Dept: INFUSION THERAPY | Age: 64
Setting detail: INFUSION SERIES
Discharge: HOME | DRG: 542 | End: 2019-11-05
Payer: MEDICARE

## 2019-11-05 VITALS
DIASTOLIC BLOOD PRESSURE: 74 MMHG | HEART RATE: 88 BPM | TEMPERATURE: 97.9 F | RESPIRATION RATE: 18 BRPM | SYSTOLIC BLOOD PRESSURE: 135 MMHG

## 2019-11-05 PROCEDURE — 36430 TRANSFUSION BLD/BLD COMPNT: CPT

## 2019-11-05 PROCEDURE — 2580000003 HC RX 258

## 2019-11-05 PROCEDURE — 2580000003 HC RX 258: Performed by: INTERNAL MEDICINE

## 2019-11-05 PROCEDURE — P9016 RBC LEUKOCYTES REDUCED: HCPCS

## 2019-11-05 RX ORDER — FENTANYL 100 UG/H
1 PATCH TRANSDERMAL
COMMUNITY

## 2019-11-05 RX ORDER — METHADONE HYDROCHLORIDE 10 MG/1
10 TABLET ORAL EVERY 4 HOURS PRN
COMMUNITY

## 2019-11-05 RX ORDER — OXYCODONE HYDROCHLORIDE 30 MG/1
60-90 TABLET ORAL EVERY 4 HOURS PRN
COMMUNITY

## 2019-11-05 RX ORDER — 0.9 % SODIUM CHLORIDE 0.9 %
250 INTRAVENOUS SOLUTION INTRAVENOUS ONCE
Status: COMPLETED | OUTPATIENT
Start: 2019-11-05 | End: 2019-11-05

## 2019-11-05 RX ORDER — SODIUM CHLORIDE 0.9 % (FLUSH) 0.9 %
10 SYRINGE (ML) INJECTION PRN
Status: DISCONTINUED | OUTPATIENT
Start: 2019-11-05 | End: 2019-11-06 | Stop reason: HOSPADM

## 2019-11-05 RX ORDER — SULFAMETHOXAZOLE AND TRIMETHOPRIM 800; 160 MG/1; MG/1
1 TABLET ORAL 2 TIMES DAILY
Status: ON HOLD | COMMUNITY
End: 2019-11-11 | Stop reason: HOSPADM

## 2019-11-05 RX ORDER — SODIUM CHLORIDE 9 MG/ML
INJECTION, SOLUTION INTRAVENOUS
Status: COMPLETED
Start: 2019-11-05 | End: 2019-11-05

## 2019-11-05 RX ADMIN — Medication: at 11:00

## 2019-11-05 RX ADMIN — Medication 30 ML: at 13:42

## 2019-11-05 RX ADMIN — SODIUM CHLORIDE: 9 INJECTION, SOLUTION INTRAVENOUS at 11:00

## 2019-11-05 RX ADMIN — Medication 10 ML: at 10:50

## 2019-11-05 ASSESSMENT — PAIN DESCRIPTION - DIRECTION: RADIATING_TOWARDS: RIGHT LEG

## 2019-11-05 ASSESSMENT — PAIN DESCRIPTION - ONSET: ONSET: ON-GOING

## 2019-11-05 ASSESSMENT — PAIN DESCRIPTION - PAIN TYPE: TYPE: CHRONIC PAIN

## 2019-11-05 ASSESSMENT — PAIN DESCRIPTION - FREQUENCY: FREQUENCY: CONTINUOUS

## 2019-11-05 ASSESSMENT — PAIN DESCRIPTION - PROGRESSION: CLINICAL_PROGRESSION: GRADUALLY WORSENING

## 2019-11-05 ASSESSMENT — PAIN DESCRIPTION - ORIENTATION: ORIENTATION: RIGHT;LOWER

## 2019-11-05 ASSESSMENT — PAIN SCALES - GENERAL
PAINLEVEL_OUTOF10: 7
PAINLEVEL_OUTOF10: 2

## 2019-11-05 ASSESSMENT — PAIN DESCRIPTION - LOCATION: LOCATION: BACK

## 2019-11-05 ASSESSMENT — PAIN - FUNCTIONAL ASSESSMENT: PAIN_FUNCTIONAL_ASSESSMENT: PREVENTS OR INTERFERES WITH MANY ACTIVE NOT PASSIVE ACTIVITIES

## 2019-11-05 ASSESSMENT — PAIN DESCRIPTION - DESCRIPTORS: DESCRIPTORS: OTHER (COMMENT)

## 2019-11-05 NOTE — PROGRESS NOTES
Outpatient [address] Access    NAME:  Davian Doctor OF BIRTH:  1955  MEDICAL RECORD NUMBER:  [id number]  DATE:  11/5/2019    Patient arrived to [place] [address]   [] per wheelchair   [x] ambulatory using walker    Port Site Location:  right chest  Port Site:  Redness: No  Bruising: No   Edema: No  Pain: No     Port Site cleansed with:  Chloroprep Scrub for 30 seconds and air dried? Yes     Port Accessed with: 20 Gauge 3/4 inch Power Port non coring needle using sterile technique. Blood return obtained: Yes  Flushed with 10 ml Normal Saline using push-pause method. Port flushes without resistance. Response to treatment:  Well tolerated by patient. Electronically signed by Emilia Fry RN on 11/5/2019 at 11:23 AM    Outpatient [id number] [facility]    Blood Transfusion    NAME:  Lurdes Soto:  1955  MEDICAL RECORD NUMBER:  [id number]  DATE:  11/5/2019     Patient arrived to [place] [address]   [] per wheelchair   [x] ambulatory using walker      Patient with history of metastatic colon cancer since 2014. Has rectovaginal  Fistula with chronic vaginal bleeding so needs frequent blood transfusions. Presently with lesion T 11 which is invading spinal canal and causing severe pain in right lower back and flank pain. Has had 6 radiation treatments last month which did help but pain is starting to come back. Rates pain at 7 presently. HGB was 6 this week and she received 1 unit yesterday and a second unit today. Consent Obtained: Yes , yesterday with 1st unit  Is this the patient's first Transfusion? No    Did the patient experience any adverse reactions to previous Transfusion?  No  Patient Active Problem List   Diagnosis    Adjustment reaction with prolonged depressive reaction    Asthma    Radiation proctitis    Essential hypertension    [facility]ostomy status ([facility]    Cervical cancer ([facility]  Proctodynia    Malignant neoplasm of sigmoid colon ([facility]    Tobacco dependence    Asymptomatic gallstones    Metastasis to liver (HCC)    Insomnia    Urinary incontinence    Vaginal discharge    History of radiation therapy    Metastatic adenocarcinoma to lung (HCC)    Chemotherapy-induced neuropathy (HCC)    Constipation due to pain medication    Rectovaginal fistula    Vesico-vaginal fistula    Cancer associated pain    Nephrostomy status ([facility]    Chronic kidney disease, stage III (moderate) (HCC)    Mild protein-calorie malnutrition (HCC)    Right leg claudication (HCC)    Right iliac artery stenosis (HCC)    History of hypertension resolved with weight loss    Hypokalemia    Amputated toe of right foot (HCC) 4th toe due to ischemia    Closed fracture of neck of left femur (HCC)    Closed displaced fracture of left femoral neck (HCC)    Tachycardia    Hip fracture, left, closed, with routine healing, subsequent encounter    Arthritis of left knee    Lymphedema of both lower extremities    Back pain    Anemia    Severe malnutrition (HCC)    UTI (urinary tract infection)     Patient with Bone Marrow Suppression due to chemotherapy? Yes    Patient with history of GI bleeding? Yes , rectal abcess due to colon cancer   Patient with history of CHF? No  Patient with history of COPD?  No    Hemoglobin/Hematocrit:    Lab Results   Component Value Date    HGB 9.6 10/10/2019    HCT 29.3 10/10/2019       Special Transfusion Products Requested: No  Blood was:  []  Irradiated    [] Washed    [] Other    Is the patient experiencing any:  Fatigue:   []   None  []   Increase over baseline but not altering normal activities  [x]   Moderate of causing difficulty performing some activities  []   Severe or loss of ability to perform some activities  []   Bedridden or disabling    Dizziness or Lightheadedness:   []   None  []   No Interference  [x]   Interferes with functioning but not activities of daily living  []   Interferes with daily activies  []   Bedridden or disabling    Shortness of Breath:   []   None   [x]   Dyspneic on exertion   []   Dyspnea with normal activities  []   Dyspnea at rest    Edema: 2+ Location of edema: left leg and right leg    Tachycardia: No    Heart Palpitations: No      Chest Pain: No    Premedicated: No premeds ordered. [] Tylenol 325 mg oral  [] Tylenol 650 mg oral    [] Benadryl 25 mg oral   [] Benadryl 50 mg oral  [] Other    Administered Blood via: [x] Peripheral access    [] PICC access    [x] Port access    Numbers of units transfused 1 over 2 hours    Monitoring of vital signs and rate changes are documented in flow sheets. Response to treatment:  Well tolerated by patient.     Education:    Indicates understanding      Electronically signed by Dinorah Chatman RN on 11/5/2019 at 2:00 pm.

## 2019-11-08 ENCOUNTER — APPOINTMENT (OUTPATIENT)
Dept: CT IMAGING | Age: 64
DRG: 542 | End: 2019-11-08
Payer: MEDICARE

## 2019-11-08 ENCOUNTER — APPOINTMENT (OUTPATIENT)
Dept: INTERVENTIONAL RADIOLOGY/VASCULAR | Age: 64
DRG: 542 | End: 2019-11-08
Payer: MEDICARE

## 2019-11-08 ENCOUNTER — HOSPITAL ENCOUNTER (INPATIENT)
Age: 64
LOS: 3 days | Discharge: HOSPICE | DRG: 542 | End: 2019-11-11
Attending: INTERNAL MEDICINE | Admitting: INTERNAL MEDICINE
Payer: MEDICARE

## 2019-11-08 DIAGNOSIS — N39.0 COMPLICATED UTI (URINARY TRACT INFECTION): Primary | ICD-10-CM

## 2019-11-08 DIAGNOSIS — M89.9 LYTIC LESION OF BONE ON X-RAY: ICD-10-CM

## 2019-11-08 DIAGNOSIS — T83.512A INFECTION ASSOCIATED WITH NEPHROSTOMY CATHETER, INITIAL ENCOUNTER (HCC): ICD-10-CM

## 2019-11-08 DIAGNOSIS — M54.9 INTRACTABLE BACK PAIN: ICD-10-CM

## 2019-11-08 DIAGNOSIS — M54.41 ACUTE RIGHT-SIDED LOW BACK PAIN WITH RIGHT-SIDED SCIATICA: ICD-10-CM

## 2019-11-08 DIAGNOSIS — E27.8 MASS OF BOTH ADRENAL GLANDS (HCC): ICD-10-CM

## 2019-11-08 LAB
A/G RATIO: 0.6 (ref 1.1–2.2)
ALBUMIN SERPL-MCNC: 2.4 G/DL (ref 3.4–5)
ALP BLD-CCNC: 146 U/L (ref 40–129)
ALT SERPL-CCNC: <5 U/L (ref 10–40)
ANION GAP SERPL CALCULATED.3IONS-SCNC: 13 MMOL/L (ref 3–16)
AST SERPL-CCNC: 9 U/L (ref 15–37)
BACTERIA: ABNORMAL /HPF
BASOPHILS ABSOLUTE: 0 K/UL (ref 0–0.2)
BASOPHILS RELATIVE PERCENT: 0.7 %
BILIRUB SERPL-MCNC: 0.5 MG/DL (ref 0–1)
BILIRUBIN URINE: ABNORMAL
BILIRUBIN URINE: NEGATIVE
BLOOD, URINE: ABNORMAL
BLOOD, URINE: NEGATIVE
BUN BLDV-MCNC: 15 MG/DL (ref 7–20)
CALCIUM SERPL-MCNC: 8.4 MG/DL (ref 8.3–10.6)
CHLORIDE BLD-SCNC: 103 MMOL/L (ref 99–110)
CLARITY: ABNORMAL
CLARITY: ABNORMAL
CO2: 23 MMOL/L (ref 21–32)
COLOR: YELLOW
COLOR: YELLOW
COMMENT UA: ABNORMAL
CREAT SERPL-MCNC: 1 MG/DL (ref 0.6–1.2)
CRYSTALS, UA: ABNORMAL /HPF
EOSINOPHILS ABSOLUTE: 0 K/UL (ref 0–0.6)
EOSINOPHILS RELATIVE PERCENT: 0.5 %
EPITHELIAL CELLS, UA: 2 /HPF (ref 0–5)
EPITHELIAL CELLS, UA: 4 /HPF (ref 0–5)
GFR AFRICAN AMERICAN: >60
GFR NON-AFRICAN AMERICAN: 56
GLOBULIN: 3.8 G/DL
GLUCOSE BLD-MCNC: 101 MG/DL (ref 70–99)
GLUCOSE URINE: 100 MG/DL
GLUCOSE URINE: NEGATIVE MG/DL
HCT VFR BLD CALC: 31.4 % (ref 36–48)
HEMOGLOBIN: 10.4 G/DL (ref 12–16)
HYALINE CASTS: 10 /LPF (ref 0–8)
HYALINE CASTS: 4 /LPF (ref 0–8)
INR BLD: 1.45 (ref 0.86–1.14)
KETONES, URINE: 15 MG/DL
KETONES, URINE: 15 MG/DL
LACTIC ACID: 0.8 MMOL/L (ref 0.4–2)
LEUKOCYTE ESTERASE, URINE: ABNORMAL
LEUKOCYTE ESTERASE, URINE: ABNORMAL
LYMPHOCYTES ABSOLUTE: 0.1 K/UL (ref 1–5.1)
LYMPHOCYTES RELATIVE PERCENT: 2.7 %
MCH RBC QN AUTO: 28.8 PG (ref 26–34)
MCHC RBC AUTO-ENTMCNC: 33.2 G/DL (ref 31–36)
MCV RBC AUTO: 86.8 FL (ref 80–100)
MICROSCOPIC EXAMINATION: YES
MICROSCOPIC EXAMINATION: YES
MONOCYTES ABSOLUTE: 0.4 K/UL (ref 0–1.3)
MONOCYTES RELATIVE PERCENT: 8.3 %
NEUTROPHILS ABSOLUTE: 4 K/UL (ref 1.7–7.7)
NEUTROPHILS RELATIVE PERCENT: 87.8 %
NITRITE, URINE: NEGATIVE
NITRITE, URINE: POSITIVE
PDW BLD-RTO: 17 % (ref 12.4–15.4)
PH UA: 7.5 (ref 5–8)
PH UA: 8.5 (ref 5–8)
PLATELET # BLD: 125 K/UL (ref 135–450)
PMV BLD AUTO: 6.9 FL (ref 5–10.5)
POTASSIUM REFLEX MAGNESIUM: 3.6 MMOL/L (ref 3.5–5.1)
PROTEIN UA: 100 MG/DL
PROTEIN UA: 30 MG/DL
PROTHROMBIN TIME: 16.5 SEC (ref 9.8–13)
RBC # BLD: 3.61 M/UL (ref 4–5.2)
RBC UA: 1 /HPF (ref 0–4)
RBC UA: 808 /HPF (ref 0–4)
SODIUM BLD-SCNC: 139 MMOL/L (ref 136–145)
SPECIFIC GRAVITY UA: 1.01 (ref 1–1.03)
SPECIFIC GRAVITY UA: 1.01 (ref 1–1.03)
TOTAL PROTEIN: 6.2 G/DL (ref 6.4–8.2)
URINE REFLEX TO CULTURE: YES
URINE REFLEX TO CULTURE: YES
URINE TYPE: ABNORMAL
URINE TYPE: ABNORMAL
UROBILINOGEN, URINE: 0.2 E.U./DL
UROBILINOGEN, URINE: 1 E.U./DL
WBC # BLD: 4.6 K/UL (ref 4–11)
WBC UA: 5 /HPF (ref 0–5)
WBC UA: 58 /HPF (ref 0–5)

## 2019-11-08 PROCEDURE — 74176 CT ABD & PELVIS W/O CONTRAST: CPT

## 2019-11-08 PROCEDURE — 94760 N-INVAS EAR/PLS OXIMETRY 1: CPT

## 2019-11-08 PROCEDURE — 2709999900 IR GUIDED NEPHROSTOMY CATH EXCHANGE

## 2019-11-08 PROCEDURE — 6360000002 HC RX W HCPCS: Performed by: RADIOLOGY

## 2019-11-08 PROCEDURE — 2580000003 HC RX 258: Performed by: INTERNAL MEDICINE

## 2019-11-08 PROCEDURE — 83605 ASSAY OF LACTIC ACID: CPT

## 2019-11-08 PROCEDURE — 6370000000 HC RX 637 (ALT 250 FOR IP): Performed by: INTERNAL MEDICINE

## 2019-11-08 PROCEDURE — 0T25X0Z CHANGE DRAINAGE DEVICE IN KIDNEY, EXTERNAL APPROACH: ICD-10-PCS | Performed by: INTERNAL MEDICINE

## 2019-11-08 PROCEDURE — 6360000004 HC RX CONTRAST MEDICATION: Performed by: RADIOLOGY

## 2019-11-08 PROCEDURE — 87040 BLOOD CULTURE FOR BACTERIA: CPT

## 2019-11-08 PROCEDURE — 6360000002 HC RX W HCPCS: Performed by: PHYSICIAN ASSISTANT

## 2019-11-08 PROCEDURE — 50435 EXCHANGE NEPHROSTOMY CATH: CPT

## 2019-11-08 PROCEDURE — 72131 CT LUMBAR SPINE W/O DYE: CPT

## 2019-11-08 PROCEDURE — 81001 URINALYSIS AUTO W/SCOPE: CPT

## 2019-11-08 PROCEDURE — 99152 MOD SED SAME PHYS/QHP 5/>YRS: CPT

## 2019-11-08 PROCEDURE — 6370000000 HC RX 637 (ALT 250 FOR IP): Performed by: PHYSICIAN ASSISTANT

## 2019-11-08 PROCEDURE — 85610 PROTHROMBIN TIME: CPT

## 2019-11-08 PROCEDURE — 6360000002 HC RX W HCPCS: Performed by: INTERNAL MEDICINE

## 2019-11-08 PROCEDURE — 99285 EMERGENCY DEPT VISIT HI MDM: CPT

## 2019-11-08 PROCEDURE — 36415 COLL VENOUS BLD VENIPUNCTURE: CPT

## 2019-11-08 PROCEDURE — 96361 HYDRATE IV INFUSION ADD-ON: CPT

## 2019-11-08 PROCEDURE — 87086 URINE CULTURE/COLONY COUNT: CPT

## 2019-11-08 PROCEDURE — 1200000000 HC SEMI PRIVATE

## 2019-11-08 PROCEDURE — 2580000003 HC RX 258: Performed by: PHYSICIAN ASSISTANT

## 2019-11-08 PROCEDURE — 85025 COMPLETE CBC W/AUTO DIFF WBC: CPT

## 2019-11-08 PROCEDURE — 96365 THER/PROPH/DIAG IV INF INIT: CPT

## 2019-11-08 PROCEDURE — 80053 COMPREHEN METABOLIC PANEL: CPT

## 2019-11-08 RX ORDER — MIRTAZAPINE 15 MG/1
15 TABLET, FILM COATED ORAL NIGHTLY
Status: DISCONTINUED | OUTPATIENT
Start: 2019-11-08 | End: 2019-11-11 | Stop reason: HOSPADM

## 2019-11-08 RX ORDER — SODIUM CHLORIDE, SODIUM LACTATE, POTASSIUM CHLORIDE, CALCIUM CHLORIDE 600; 310; 30; 20 MG/100ML; MG/100ML; MG/100ML; MG/100ML
1000 INJECTION, SOLUTION INTRAVENOUS ONCE
Status: COMPLETED | OUTPATIENT
Start: 2019-11-08 | End: 2019-11-08

## 2019-11-08 RX ORDER — ATORVASTATIN CALCIUM 20 MG/1
20 TABLET, FILM COATED ORAL DAILY
Status: DISCONTINUED | OUTPATIENT
Start: 2019-11-08 | End: 2019-11-11 | Stop reason: HOSPADM

## 2019-11-08 RX ORDER — OXYCODONE HYDROCHLORIDE 30 MG/1
90 TABLET ORAL EVERY 4 HOURS PRN
Status: DISCONTINUED | OUTPATIENT
Start: 2019-11-08 | End: 2019-11-11 | Stop reason: HOSPADM

## 2019-11-08 RX ORDER — FENTANYL 100 UG/H
1 PATCH TRANSDERMAL
Status: DISCONTINUED | OUTPATIENT
Start: 2019-11-09 | End: 2019-11-11 | Stop reason: HOSPADM

## 2019-11-08 RX ORDER — SODIUM CHLORIDE 0.9 % (FLUSH) 0.9 %
10 SYRINGE (ML) INJECTION EVERY 12 HOURS SCHEDULED
Status: DISCONTINUED | OUTPATIENT
Start: 2019-11-08 | End: 2019-11-11 | Stop reason: HOSPADM

## 2019-11-08 RX ORDER — SENNA AND DOCUSATE SODIUM 50; 8.6 MG/1; MG/1
2 TABLET, FILM COATED ORAL DAILY PRN
Status: DISCONTINUED | OUTPATIENT
Start: 2019-11-08 | End: 2019-11-11 | Stop reason: HOSPADM

## 2019-11-08 RX ORDER — OXYCODONE HYDROCHLORIDE 30 MG/1
90 TABLET ORAL ONCE
Status: COMPLETED | OUTPATIENT
Start: 2019-11-08 | End: 2019-11-08

## 2019-11-08 RX ORDER — FENTANYL CITRATE 50 UG/ML
INJECTION, SOLUTION INTRAMUSCULAR; INTRAVENOUS DAILY PRN
Status: COMPLETED | OUTPATIENT
Start: 2019-11-08 | End: 2019-11-08

## 2019-11-08 RX ORDER — AMLODIPINE BESYLATE 5 MG/1
5 TABLET ORAL DAILY
Status: DISCONTINUED | OUTPATIENT
Start: 2019-11-08 | End: 2019-11-11 | Stop reason: HOSPADM

## 2019-11-08 RX ORDER — FUROSEMIDE 20 MG/1
20 TABLET ORAL 2 TIMES DAILY
Status: DISCONTINUED | OUTPATIENT
Start: 2019-11-08 | End: 2019-11-11 | Stop reason: HOSPADM

## 2019-11-08 RX ORDER — ONDANSETRON 2 MG/ML
4 INJECTION INTRAMUSCULAR; INTRAVENOUS EVERY 6 HOURS PRN
Status: DISCONTINUED | OUTPATIENT
Start: 2019-11-08 | End: 2019-11-11 | Stop reason: HOSPADM

## 2019-11-08 RX ORDER — SODIUM CHLORIDE 0.9 % (FLUSH) 0.9 %
10 SYRINGE (ML) INJECTION PRN
Status: DISCONTINUED | OUTPATIENT
Start: 2019-11-08 | End: 2019-11-11 | Stop reason: HOSPADM

## 2019-11-08 RX ORDER — NORTRIPTYLINE HYDROCHLORIDE 25 MG/1
75 CAPSULE ORAL NIGHTLY
Status: DISCONTINUED | OUTPATIENT
Start: 2019-11-08 | End: 2019-11-11 | Stop reason: HOSPADM

## 2019-11-08 RX ORDER — METHADONE HYDROCHLORIDE 10 MG/1
10 TABLET ORAL EVERY 4 HOURS PRN
Status: DISCONTINUED | OUTPATIENT
Start: 2019-11-08 | End: 2019-11-11 | Stop reason: HOSPADM

## 2019-11-08 RX ORDER — ONDANSETRON 4 MG/1
8 TABLET, FILM COATED ORAL EVERY 8 HOURS PRN
Status: DISCONTINUED | OUTPATIENT
Start: 2019-11-08 | End: 2019-11-11 | Stop reason: HOSPADM

## 2019-11-08 RX ORDER — OXYCODONE HYDROCHLORIDE 30 MG/1
30 TABLET ORAL EVERY 4 HOURS PRN
Status: DISCONTINUED | OUTPATIENT
Start: 2019-11-08 | End: 2019-11-08

## 2019-11-08 RX ADMIN — FENTANYL CITRATE 50 MCG: 50 INJECTION INTRAMUSCULAR; INTRAVENOUS at 15:20

## 2019-11-08 RX ADMIN — FUROSEMIDE 20 MG: 20 TABLET ORAL at 10:39

## 2019-11-08 RX ADMIN — METHADONE HYDROCHLORIDE 10 MG: 10 TABLET ORAL at 10:39

## 2019-11-08 RX ADMIN — OXYCODONE HYDROCHLORIDE 90 MG: 30 TABLET ORAL at 11:17

## 2019-11-08 RX ADMIN — NORTRIPTYLINE HYDROCHLORIDE 75 MG: 25 CAPSULE ORAL at 22:45

## 2019-11-08 RX ADMIN — OXYCODONE HYDROCHLORIDE 90 MG: 30 TABLET ORAL at 07:15

## 2019-11-08 RX ADMIN — MIRTAZAPINE 15 MG: 15 TABLET, FILM COATED ORAL at 22:45

## 2019-11-08 RX ADMIN — SODIUM CHLORIDE, POTASSIUM CHLORIDE, SODIUM LACTATE AND CALCIUM CHLORIDE 1000 ML: 600; 310; 30; 20 INJECTION, SOLUTION INTRAVENOUS at 07:15

## 2019-11-08 RX ADMIN — FUROSEMIDE 20 MG: 20 TABLET ORAL at 16:34

## 2019-11-08 RX ADMIN — OXYCODONE HYDROCHLORIDE 90 MG: 30 TABLET ORAL at 22:48

## 2019-11-08 RX ADMIN — IOPAMIDOL 15 ML: 612 INJECTION, SOLUTION INTRAVENOUS at 15:56

## 2019-11-08 RX ADMIN — OXYCODONE HYDROCHLORIDE 90 MG: 30 TABLET ORAL at 16:34

## 2019-11-08 RX ADMIN — CEFEPIME HYDROCHLORIDE 1 G: 1 INJECTION, POWDER, FOR SOLUTION INTRAMUSCULAR; INTRAVENOUS at 09:08

## 2019-11-08 RX ADMIN — CEFEPIME HYDROCHLORIDE 2 G: 2 INJECTION, POWDER, FOR SOLUTION INTRAVENOUS at 19:00

## 2019-11-08 RX ADMIN — AMLODIPINE BESYLATE 5 MG: 5 TABLET ORAL at 10:39

## 2019-11-08 ASSESSMENT — PAIN DESCRIPTION - PROGRESSION
CLINICAL_PROGRESSION: GRADUALLY WORSENING

## 2019-11-08 ASSESSMENT — PAIN DESCRIPTION - FREQUENCY
FREQUENCY: CONTINUOUS

## 2019-11-08 ASSESSMENT — PAIN DESCRIPTION - LOCATION
LOCATION: BACK

## 2019-11-08 ASSESSMENT — PAIN SCALES - GENERAL
PAINLEVEL_OUTOF10: 8
PAINLEVEL_OUTOF10: 6
PAINLEVEL_OUTOF10: 8
PAINLEVEL_OUTOF10: 6
PAINLEVEL_OUTOF10: 4
PAINLEVEL_OUTOF10: 6
PAINLEVEL_OUTOF10: 5
PAINLEVEL_OUTOF10: 6
PAINLEVEL_OUTOF10: 8
PAINLEVEL_OUTOF10: 5
PAINLEVEL_OUTOF10: 8

## 2019-11-08 ASSESSMENT — PAIN DESCRIPTION - PAIN TYPE
TYPE: ACUTE PAIN

## 2019-11-08 ASSESSMENT — PAIN DESCRIPTION - ORIENTATION
ORIENTATION: RIGHT
ORIENTATION: LOWER
ORIENTATION: LOWER

## 2019-11-08 ASSESSMENT — PAIN DESCRIPTION - ONSET
ONSET: PROGRESSIVE
ONSET: ON-GOING
ONSET: ON-GOING

## 2019-11-08 ASSESSMENT — PAIN DESCRIPTION - DESCRIPTORS
DESCRIPTORS: CONSTANT;SHARP
DESCRIPTORS: CONSTANT;SHARP;SHOOTING
DESCRIPTORS: SHARP;SHOOTING;CONSTANT
DESCRIPTORS: CONSTANT;SHARP
DESCRIPTORS: SHOOTING;SHARP;CONSTANT

## 2019-11-09 PROBLEM — C79.70 MALIGNANT NEOPLASM METASTATIC TO ADRENAL GLAND (HCC): Chronic | Status: ACTIVE | Noted: 2019-11-09

## 2019-11-09 PROBLEM — N39.0 UTI (URINARY TRACT INFECTION): Status: RESOLVED | Noted: 2019-10-10 | Resolved: 2019-11-09

## 2019-11-09 PROBLEM — C79.51 BONE METASTASES: Status: ACTIVE | Noted: 2019-11-09

## 2019-11-09 LAB
A/G RATIO: 0.8 (ref 1.1–2.2)
ALBUMIN SERPL-MCNC: 2.6 G/DL (ref 3.4–5)
ALP BLD-CCNC: 131 U/L (ref 40–129)
ALT SERPL-CCNC: <5 U/L (ref 10–40)
ANION GAP SERPL CALCULATED.3IONS-SCNC: 11 MMOL/L (ref 3–16)
AST SERPL-CCNC: 7 U/L (ref 15–37)
BASOPHILS ABSOLUTE: 0 K/UL (ref 0–0.2)
BASOPHILS RELATIVE PERCENT: 0.8 %
BILIRUB SERPL-MCNC: <0.2 MG/DL (ref 0–1)
BUN BLDV-MCNC: 17 MG/DL (ref 7–20)
CALCIUM SERPL-MCNC: 8.1 MG/DL (ref 8.3–10.6)
CHLORIDE BLD-SCNC: 100 MMOL/L (ref 99–110)
CO2: 25 MMOL/L (ref 21–32)
CREAT SERPL-MCNC: 1 MG/DL (ref 0.6–1.2)
EOSINOPHILS ABSOLUTE: 0.1 K/UL (ref 0–0.6)
EOSINOPHILS RELATIVE PERCENT: 2.8 %
GFR AFRICAN AMERICAN: >60
GFR NON-AFRICAN AMERICAN: 56
GLOBULIN: 3.1 G/DL
GLUCOSE BLD-MCNC: 131 MG/DL (ref 70–99)
HCT VFR BLD CALC: 27.4 % (ref 36–48)
HEMOGLOBIN: 9 G/DL (ref 12–16)
LYMPHOCYTES ABSOLUTE: 0.2 K/UL (ref 1–5.1)
LYMPHOCYTES RELATIVE PERCENT: 4 %
MAGNESIUM: 1.9 MG/DL (ref 1.8–2.4)
MCH RBC QN AUTO: 28.3 PG (ref 26–34)
MCHC RBC AUTO-ENTMCNC: 32.8 G/DL (ref 31–36)
MCV RBC AUTO: 86.3 FL (ref 80–100)
MONOCYTES ABSOLUTE: 0.4 K/UL (ref 0–1.3)
MONOCYTES RELATIVE PERCENT: 9.3 %
NEUTROPHILS ABSOLUTE: 3.6 K/UL (ref 1.7–7.7)
NEUTROPHILS RELATIVE PERCENT: 83.1 %
PDW BLD-RTO: 16.9 % (ref 12.4–15.4)
PLATELET # BLD: 125 K/UL (ref 135–450)
PMV BLD AUTO: 6.8 FL (ref 5–10.5)
POTASSIUM REFLEX MAGNESIUM: 3.3 MMOL/L (ref 3.5–5.1)
RBC # BLD: 3.17 M/UL (ref 4–5.2)
SODIUM BLD-SCNC: 136 MMOL/L (ref 136–145)
TOTAL PROTEIN: 5.7 G/DL (ref 6.4–8.2)
URINE CULTURE, ROUTINE: NORMAL
WBC # BLD: 4.4 K/UL (ref 4–11)

## 2019-11-09 PROCEDURE — 6370000000 HC RX 637 (ALT 250 FOR IP): Performed by: INTERNAL MEDICINE

## 2019-11-09 PROCEDURE — 90686 IIV4 VACC NO PRSV 0.5 ML IM: CPT | Performed by: INTERNAL MEDICINE

## 2019-11-09 PROCEDURE — 6370000000 HC RX 637 (ALT 250 FOR IP): Performed by: NURSE PRACTITIONER

## 2019-11-09 PROCEDURE — 85025 COMPLETE CBC W/AUTO DIFF WBC: CPT

## 2019-11-09 PROCEDURE — 94760 N-INVAS EAR/PLS OXIMETRY 1: CPT

## 2019-11-09 PROCEDURE — 2580000003 HC RX 258: Performed by: INTERNAL MEDICINE

## 2019-11-09 PROCEDURE — 80053 COMPREHEN METABOLIC PANEL: CPT

## 2019-11-09 PROCEDURE — 1200000000 HC SEMI PRIVATE

## 2019-11-09 PROCEDURE — 6360000002 HC RX W HCPCS: Performed by: INTERNAL MEDICINE

## 2019-11-09 PROCEDURE — G0008 ADMIN INFLUENZA VIRUS VAC: HCPCS | Performed by: INTERNAL MEDICINE

## 2019-11-09 PROCEDURE — 83735 ASSAY OF MAGNESIUM: CPT

## 2019-11-09 RX ORDER — POTASSIUM CHLORIDE 20 MEQ/1
20 TABLET, EXTENDED RELEASE ORAL 2 TIMES DAILY WITH MEALS
Status: DISCONTINUED | OUTPATIENT
Start: 2019-11-09 | End: 2019-11-11 | Stop reason: HOSPADM

## 2019-11-09 RX ADMIN — SODIUM CHLORIDE, PRESERVATIVE FREE 10 ML: 5 INJECTION INTRAVENOUS at 20:41

## 2019-11-09 RX ADMIN — POTASSIUM CHLORIDE 20 MEQ: 20 TABLET, EXTENDED RELEASE ORAL at 17:04

## 2019-11-09 RX ADMIN — NORTRIPTYLINE HYDROCHLORIDE 75 MG: 25 CAPSULE ORAL at 20:41

## 2019-11-09 RX ADMIN — METHADONE HYDROCHLORIDE 10 MG: 10 TABLET ORAL at 09:18

## 2019-11-09 RX ADMIN — FUROSEMIDE 20 MG: 20 TABLET ORAL at 09:18

## 2019-11-09 RX ADMIN — OXYCODONE HYDROCHLORIDE 90 MG: 30 TABLET ORAL at 18:40

## 2019-11-09 RX ADMIN — INFLUENZA A VIRUS A/BRISBANE/02/2018 IVR-190 (H1N1) ANTIGEN (PROPIOLACTONE INACTIVATED), INFLUENZA A VIRUS A/KANSAS/14/2017 X-327 (H3N2) ANTIGEN (PROPIOLACTONE INACTIVATED), INFLUENZA B VIRUS B/MARYLAND/15/2016 ANTIGEN (PROPIOLACTONE INACTIVATED), INFLUENZA B VIRUS B/PHUKET/3073/2013 BVR-1B ANTIGEN (PROPIOLACTONE INACTIVATED) 0.5 ML: 15; 15; 15; 15 INJECTION, SUSPENSION INTRAMUSCULAR at 09:19

## 2019-11-09 RX ADMIN — ATORVASTATIN CALCIUM 20 MG: 20 TABLET, FILM COATED ORAL at 09:18

## 2019-11-09 RX ADMIN — METHADONE HYDROCHLORIDE 10 MG: 10 TABLET ORAL at 20:45

## 2019-11-09 RX ADMIN — OXYCODONE HYDROCHLORIDE 90 MG: 30 TABLET ORAL at 10:23

## 2019-11-09 RX ADMIN — OXYCODONE HYDROCHLORIDE 90 MG: 30 TABLET ORAL at 06:07

## 2019-11-09 RX ADMIN — AMLODIPINE BESYLATE 5 MG: 5 TABLET ORAL at 09:18

## 2019-11-09 RX ADMIN — FUROSEMIDE 20 MG: 20 TABLET ORAL at 17:04

## 2019-11-09 RX ADMIN — OXYCODONE HYDROCHLORIDE 90 MG: 30 TABLET ORAL at 14:22

## 2019-11-09 RX ADMIN — CEFEPIME HYDROCHLORIDE 2 G: 2 INJECTION, POWDER, FOR SOLUTION INTRAVENOUS at 05:46

## 2019-11-09 RX ADMIN — MIRTAZAPINE 15 MG: 15 TABLET, FILM COATED ORAL at 20:41

## 2019-11-09 RX ADMIN — POTASSIUM CHLORIDE 20 MEQ: 20 TABLET, EXTENDED RELEASE ORAL at 09:24

## 2019-11-09 RX ADMIN — CEFEPIME HYDROCHLORIDE 2 G: 2 INJECTION, POWDER, FOR SOLUTION INTRAVENOUS at 17:04

## 2019-11-09 ASSESSMENT — PAIN DESCRIPTION - DESCRIPTORS
DESCRIPTORS: CONSTANT
DESCRIPTORS: CONSTANT;SHARP
DESCRIPTORS: CONSTANT
DESCRIPTORS: CONSTANT;SHARP
DESCRIPTORS: CONSTANT
DESCRIPTORS: CONSTANT
DESCRIPTORS: CONSTANT;SHARP

## 2019-11-09 ASSESSMENT — PAIN DESCRIPTION - ORIENTATION
ORIENTATION: LOWER

## 2019-11-09 ASSESSMENT — PAIN DESCRIPTION - PAIN TYPE
TYPE: ACUTE PAIN

## 2019-11-09 ASSESSMENT — PAIN - FUNCTIONAL ASSESSMENT
PAIN_FUNCTIONAL_ASSESSMENT: PREVENTS OR INTERFERES SOME ACTIVE ACTIVITIES AND ADLS

## 2019-11-09 ASSESSMENT — PAIN DESCRIPTION - ONSET
ONSET: ON-GOING

## 2019-11-09 ASSESSMENT — PAIN DESCRIPTION - LOCATION
LOCATION: BACK

## 2019-11-09 ASSESSMENT — PAIN DESCRIPTION - PROGRESSION
CLINICAL_PROGRESSION: NOT CHANGED

## 2019-11-09 ASSESSMENT — PAIN SCALES - GENERAL
PAINLEVEL_OUTOF10: 5
PAINLEVEL_OUTOF10: 6
PAINLEVEL_OUTOF10: 8
PAINLEVEL_OUTOF10: 8
PAINLEVEL_OUTOF10: 5
PAINLEVEL_OUTOF10: 8
PAINLEVEL_OUTOF10: 7
PAINLEVEL_OUTOF10: 6
PAINLEVEL_OUTOF10: 7
PAINLEVEL_OUTOF10: 8

## 2019-11-09 ASSESSMENT — PAIN DESCRIPTION - FREQUENCY
FREQUENCY: CONTINUOUS

## 2019-11-09 ASSESSMENT — PAIN DESCRIPTION - DIRECTION
RADIATING_TOWARDS: LEG
RADIATING_TOWARDS: LEGS
RADIATING_TOWARDS: LEGS
RADIATING_TOWARDS: LEG
RADIATING_TOWARDS: LEGS
RADIATING_TOWARDS: LEG

## 2019-11-10 LAB
A/G RATIO: 0.8 (ref 1.1–2.2)
ALBUMIN SERPL-MCNC: 2.5 G/DL (ref 3.4–5)
ALP BLD-CCNC: 125 U/L (ref 40–129)
ALT SERPL-CCNC: <5 U/L (ref 10–40)
ANION GAP SERPL CALCULATED.3IONS-SCNC: 12 MMOL/L (ref 3–16)
AST SERPL-CCNC: 8 U/L (ref 15–37)
BASOPHILS ABSOLUTE: 0 K/UL (ref 0–0.2)
BASOPHILS RELATIVE PERCENT: 0.3 %
BILIRUB SERPL-MCNC: <0.2 MG/DL (ref 0–1)
BUN BLDV-MCNC: 19 MG/DL (ref 7–20)
CALCIUM SERPL-MCNC: 8.2 MG/DL (ref 8.3–10.6)
CHLORIDE BLD-SCNC: 104 MMOL/L (ref 99–110)
CO2: 23 MMOL/L (ref 21–32)
CREAT SERPL-MCNC: 1 MG/DL (ref 0.6–1.2)
EOSINOPHILS ABSOLUTE: 0.2 K/UL (ref 0–0.6)
EOSINOPHILS RELATIVE PERCENT: 4.1 %
GFR AFRICAN AMERICAN: >60
GFR NON-AFRICAN AMERICAN: 56
GLOBULIN: 3 G/DL
GLUCOSE BLD-MCNC: 107 MG/DL (ref 70–99)
HCT VFR BLD CALC: 25.2 % (ref 36–48)
HEMOGLOBIN: 8.3 G/DL (ref 12–16)
LYMPHOCYTES ABSOLUTE: 0.1 K/UL (ref 1–5.1)
LYMPHOCYTES RELATIVE PERCENT: 3.7 %
MCH RBC QN AUTO: 28.5 PG (ref 26–34)
MCHC RBC AUTO-ENTMCNC: 32.8 G/DL (ref 31–36)
MCV RBC AUTO: 86.8 FL (ref 80–100)
MONOCYTES ABSOLUTE: 0.3 K/UL (ref 0–1.3)
MONOCYTES RELATIVE PERCENT: 7.8 %
NEUTROPHILS ABSOLUTE: 3.2 K/UL (ref 1.7–7.7)
NEUTROPHILS RELATIVE PERCENT: 84.1 %
PDW BLD-RTO: 17.2 % (ref 12.4–15.4)
PLATELET # BLD: 120 K/UL (ref 135–450)
PMV BLD AUTO: 7.1 FL (ref 5–10.5)
POTASSIUM REFLEX MAGNESIUM: 4 MMOL/L (ref 3.5–5.1)
RBC # BLD: 2.91 M/UL (ref 4–5.2)
SODIUM BLD-SCNC: 139 MMOL/L (ref 136–145)
TOTAL PROTEIN: 5.5 G/DL (ref 6.4–8.2)
WBC # BLD: 3.8 K/UL (ref 4–11)

## 2019-11-10 PROCEDURE — 1200000000 HC SEMI PRIVATE

## 2019-11-10 PROCEDURE — 80053 COMPREHEN METABOLIC PANEL: CPT

## 2019-11-10 PROCEDURE — 6370000000 HC RX 637 (ALT 250 FOR IP): Performed by: NURSE PRACTITIONER

## 2019-11-10 PROCEDURE — 6370000000 HC RX 637 (ALT 250 FOR IP): Performed by: INTERNAL MEDICINE

## 2019-11-10 PROCEDURE — 2580000003 HC RX 258: Performed by: INTERNAL MEDICINE

## 2019-11-10 PROCEDURE — 94760 N-INVAS EAR/PLS OXIMETRY 1: CPT

## 2019-11-10 PROCEDURE — 85025 COMPLETE CBC W/AUTO DIFF WBC: CPT

## 2019-11-10 RX ADMIN — OXYCODONE HYDROCHLORIDE 90 MG: 30 TABLET ORAL at 12:23

## 2019-11-10 RX ADMIN — OXYCODONE HYDROCHLORIDE 90 MG: 30 TABLET ORAL at 01:47

## 2019-11-10 RX ADMIN — ATORVASTATIN CALCIUM 20 MG: 20 TABLET, FILM COATED ORAL at 08:06

## 2019-11-10 RX ADMIN — MIRTAZAPINE 15 MG: 15 TABLET, FILM COATED ORAL at 21:19

## 2019-11-10 RX ADMIN — FUROSEMIDE 20 MG: 20 TABLET ORAL at 08:06

## 2019-11-10 RX ADMIN — OXYCODONE HYDROCHLORIDE 90 MG: 30 TABLET ORAL at 17:13

## 2019-11-10 RX ADMIN — SODIUM CHLORIDE, PRESERVATIVE FREE 10 ML: 5 INJECTION INTRAVENOUS at 08:07

## 2019-11-10 RX ADMIN — AMLODIPINE BESYLATE 5 MG: 5 TABLET ORAL at 08:06

## 2019-11-10 RX ADMIN — SENNOSIDES AND DOCUSATE SODIUM 2 TABLET: 8.6; 5 TABLET ORAL at 21:26

## 2019-11-10 RX ADMIN — FUROSEMIDE 20 MG: 20 TABLET ORAL at 17:13

## 2019-11-10 RX ADMIN — SODIUM CHLORIDE, PRESERVATIVE FREE 10 ML: 5 INJECTION INTRAVENOUS at 21:22

## 2019-11-10 RX ADMIN — POTASSIUM CHLORIDE 20 MEQ: 20 TABLET, EXTENDED RELEASE ORAL at 17:13

## 2019-11-10 RX ADMIN — NORTRIPTYLINE HYDROCHLORIDE 75 MG: 25 CAPSULE ORAL at 21:18

## 2019-11-10 RX ADMIN — OXYCODONE HYDROCHLORIDE 90 MG: 30 TABLET ORAL at 08:06

## 2019-11-10 RX ADMIN — OXYCODONE HYDROCHLORIDE 90 MG: 30 TABLET ORAL at 21:19

## 2019-11-10 RX ADMIN — POTASSIUM CHLORIDE 20 MEQ: 20 TABLET, EXTENDED RELEASE ORAL at 08:06

## 2019-11-10 ASSESSMENT — PAIN DESCRIPTION - ORIENTATION
ORIENTATION: LOWER

## 2019-11-10 ASSESSMENT — PAIN DESCRIPTION - LOCATION
LOCATION: BACK

## 2019-11-10 ASSESSMENT — PAIN DESCRIPTION - DESCRIPTORS
DESCRIPTORS: CONSTANT;SHARP
DESCRIPTORS: ACHING
DESCRIPTORS: ACHING;CONSTANT
DESCRIPTORS: CONSTANT;SHARP

## 2019-11-10 ASSESSMENT — PAIN DESCRIPTION - PAIN TYPE
TYPE: ACUTE PAIN

## 2019-11-10 ASSESSMENT — PAIN SCALES - GENERAL
PAINLEVEL_OUTOF10: 8
PAINLEVEL_OUTOF10: 7
PAINLEVEL_OUTOF10: 7
PAINLEVEL_OUTOF10: 0
PAINLEVEL_OUTOF10: 7
PAINLEVEL_OUTOF10: 7
PAINLEVEL_OUTOF10: 8
PAINLEVEL_OUTOF10: 6

## 2019-11-10 ASSESSMENT — PAIN DESCRIPTION - PROGRESSION
CLINICAL_PROGRESSION: NOT CHANGED

## 2019-11-10 ASSESSMENT — PAIN DESCRIPTION - ONSET
ONSET: ON-GOING
ONSET: ON-GOING
ONSET: PROGRESSIVE
ONSET: ON-GOING

## 2019-11-10 ASSESSMENT — PAIN DESCRIPTION - FREQUENCY
FREQUENCY: CONTINUOUS

## 2019-11-10 ASSESSMENT — PAIN SCALES - WONG BAKER: WONGBAKER_NUMERICALRESPONSE: 0

## 2019-11-10 ASSESSMENT — PAIN - FUNCTIONAL ASSESSMENT
PAIN_FUNCTIONAL_ASSESSMENT: PREVENTS OR INTERFERES SOME ACTIVE ACTIVITIES AND ADLS

## 2019-11-11 VITALS
HEART RATE: 105 BPM | HEIGHT: 65 IN | OXYGEN SATURATION: 98 % | RESPIRATION RATE: 16 BRPM | BODY MASS INDEX: 18.84 KG/M2 | SYSTOLIC BLOOD PRESSURE: 149 MMHG | TEMPERATURE: 99.4 F | DIASTOLIC BLOOD PRESSURE: 83 MMHG | WEIGHT: 113.1 LBS

## 2019-11-11 LAB
A/G RATIO: 0.8 (ref 1.1–2.2)
ALBUMIN SERPL-MCNC: 2.6 G/DL (ref 3.4–5)
ALP BLD-CCNC: 164 U/L (ref 40–129)
ALT SERPL-CCNC: <5 U/L (ref 10–40)
ANION GAP SERPL CALCULATED.3IONS-SCNC: 11 MMOL/L (ref 3–16)
AST SERPL-CCNC: 12 U/L (ref 15–37)
BASOPHILS ABSOLUTE: 0 K/UL (ref 0–0.2)
BASOPHILS RELATIVE PERCENT: 0.4 %
BILIRUB SERPL-MCNC: <0.2 MG/DL (ref 0–1)
BUN BLDV-MCNC: 21 MG/DL (ref 7–20)
CALCIUM SERPL-MCNC: 8.1 MG/DL (ref 8.3–10.6)
CHLORIDE BLD-SCNC: 100 MMOL/L (ref 99–110)
CO2: 24 MMOL/L (ref 21–32)
CREAT SERPL-MCNC: 1.1 MG/DL (ref 0.6–1.2)
EOSINOPHILS ABSOLUTE: 0.1 K/UL (ref 0–0.6)
EOSINOPHILS RELATIVE PERCENT: 2.9 %
GFR AFRICAN AMERICAN: >60
GFR NON-AFRICAN AMERICAN: 50
GLOBULIN: 3.3 G/DL
GLUCOSE BLD-MCNC: 102 MG/DL (ref 70–99)
HCT VFR BLD CALC: 26.4 % (ref 36–48)
HEMOGLOBIN: 8.7 G/DL (ref 12–16)
LYMPHOCYTES ABSOLUTE: 0.2 K/UL (ref 1–5.1)
LYMPHOCYTES RELATIVE PERCENT: 3.6 %
MCH RBC QN AUTO: 28.4 PG (ref 26–34)
MCHC RBC AUTO-ENTMCNC: 33 G/DL (ref 31–36)
MCV RBC AUTO: 86.3 FL (ref 80–100)
MONOCYTES ABSOLUTE: 0.5 K/UL (ref 0–1.3)
MONOCYTES RELATIVE PERCENT: 10.3 %
NEUTROPHILS ABSOLUTE: 3.7 K/UL (ref 1.7–7.7)
NEUTROPHILS RELATIVE PERCENT: 82.8 %
PDW BLD-RTO: 17.1 % (ref 12.4–15.4)
PLATELET # BLD: 122 K/UL (ref 135–450)
PMV BLD AUTO: 7.1 FL (ref 5–10.5)
POTASSIUM REFLEX MAGNESIUM: 4.3 MMOL/L (ref 3.5–5.1)
RBC # BLD: 3.06 M/UL (ref 4–5.2)
SODIUM BLD-SCNC: 135 MMOL/L (ref 136–145)
TOTAL PROTEIN: 5.9 G/DL (ref 6.4–8.2)
WBC # BLD: 4.5 K/UL (ref 4–11)

## 2019-11-11 PROCEDURE — 80053 COMPREHEN METABOLIC PANEL: CPT

## 2019-11-11 PROCEDURE — 6370000000 HC RX 637 (ALT 250 FOR IP): Performed by: INTERNAL MEDICINE

## 2019-11-11 PROCEDURE — 6370000000 HC RX 637 (ALT 250 FOR IP): Performed by: NURSE PRACTITIONER

## 2019-11-11 PROCEDURE — 94760 N-INVAS EAR/PLS OXIMETRY 1: CPT

## 2019-11-11 PROCEDURE — 36591 DRAW BLOOD OFF VENOUS DEVICE: CPT

## 2019-11-11 PROCEDURE — 85025 COMPLETE CBC W/AUTO DIFF WBC: CPT

## 2019-11-11 RX ADMIN — OXYCODONE HYDROCHLORIDE 90 MG: 30 TABLET ORAL at 10:24

## 2019-11-11 RX ADMIN — OXYCODONE HYDROCHLORIDE 90 MG: 30 TABLET ORAL at 14:52

## 2019-11-11 RX ADMIN — ATORVASTATIN CALCIUM 20 MG: 20 TABLET, FILM COATED ORAL at 10:24

## 2019-11-11 RX ADMIN — OXYCODONE HYDROCHLORIDE 90 MG: 30 TABLET ORAL at 02:04

## 2019-11-11 RX ADMIN — AMLODIPINE BESYLATE 5 MG: 5 TABLET ORAL at 10:24

## 2019-11-11 RX ADMIN — POTASSIUM CHLORIDE 20 MEQ: 20 TABLET, EXTENDED RELEASE ORAL at 10:24

## 2019-11-11 RX ADMIN — FUROSEMIDE 20 MG: 20 TABLET ORAL at 10:24

## 2019-11-11 ASSESSMENT — PAIN DESCRIPTION - PROGRESSION
CLINICAL_PROGRESSION: NOT CHANGED

## 2019-11-11 ASSESSMENT — PAIN SCALES - GENERAL
PAINLEVEL_OUTOF10: 0
PAINLEVEL_OUTOF10: 0
PAINLEVEL_OUTOF10: 8
PAINLEVEL_OUTOF10: 9
PAINLEVEL_OUTOF10: 8

## 2019-11-11 ASSESSMENT — PAIN DESCRIPTION - FREQUENCY
FREQUENCY: CONTINUOUS

## 2019-11-11 ASSESSMENT — PAIN DESCRIPTION - ORIENTATION
ORIENTATION: LOWER

## 2019-11-11 ASSESSMENT — PAIN DESCRIPTION - DESCRIPTORS
DESCRIPTORS: ACHING

## 2019-11-11 ASSESSMENT — PAIN DESCRIPTION - ONSET
ONSET: ON-GOING

## 2019-11-11 ASSESSMENT — PAIN DESCRIPTION - DIRECTION: RADIATING_TOWARDS: LEG

## 2019-11-11 ASSESSMENT — PAIN DESCRIPTION - LOCATION
LOCATION: BACK

## 2019-11-11 ASSESSMENT — PAIN DESCRIPTION - PAIN TYPE
TYPE: ACUTE PAIN

## 2019-11-11 ASSESSMENT — PAIN SCALES - WONG BAKER: WONGBAKER_NUMERICALRESPONSE: 0

## 2019-11-12 ENCOUNTER — CARE COORDINATION (OUTPATIENT)
Dept: CASE MANAGEMENT | Age: 64
End: 2019-11-12

## 2019-11-13 LAB
BLOOD CULTURE, ROUTINE: NORMAL
CULTURE, BLOOD 2: NORMAL

## 2019-12-16 DIAGNOSIS — R60.0 PEDAL EDEMA: ICD-10-CM

## 2019-12-16 DIAGNOSIS — N18.30 CHRONIC KIDNEY DISEASE, STAGE III (MODERATE) (HCC): ICD-10-CM

## 2019-12-16 RX ORDER — FUROSEMIDE 20 MG/1
TABLET ORAL
Qty: 180 TABLET | Refills: 1 | Status: SHIPPED | OUTPATIENT
Start: 2019-12-16

## 2020-01-22 RX ORDER — MIRTAZAPINE 15 MG/1
TABLET, FILM COATED ORAL
Qty: 90 TABLET | Refills: 0 | OUTPATIENT
Start: 2020-01-22

## 2020-02-25 NOTE — PROGRESS NOTES
"Pt sitting up at bedside. States feeling \"a little better since I was able to get 2 hours of sleep\" fsbs check and then admit md to bedside. Vss. Call light in hand. No distress noted  " Outpatient [address] Access    NAME:  Ashlyn Taylor OF BIRTH:  1955  MEDICAL RECORD NUMBER:  [id number]  DATE:  11/21/2018    Patient arrived to [place] [address]   [] per wheelchair   [x] ambulatory     Port Site Location:  right chest  Port Site:  Redness: No  Bruising: No   Edema: No  Pain: No     Port Site cleansed with:  Chloroprep Scrub for 30 seconds and air dried? Yes     Port Accessed with: [address] non coring needle using sterile technique. Blood return obtained: Yes  Flushed with 10 ml Normal Saline using push-pause method. Port flushes without resistance. Response to treatment:  Well tolerated by patient. Electronically signed by Ginna Bains RN on 11/21/2018 at [facility]    Blood Transfusion    NAME:  Ashlyn Taylor OF BIRTH:  1955  MEDICAL RECORD NUMBER:  [id number]  DATE:  11/21/2018     Patient arrived to [place] [address]   [] per wheelchair   [] ambulatory         Consent Obtained: Yes  Is this the patient's first Transfusion? No    Did the patient experience any adverse reactions to previous Transfusion?  No  Patient Active Problem List   Diagnosis    Adjustment reaction with prolonged depressive reaction    Asthma    Radiation proctitis    Essential hypertension    [facility]ostomy status ([facility]    Cervical cancer ([address]    Proctodynia    Malignant neoplasm of sigmoid colon ([address]    Tobacco dependence    Asymptomatic gallstones    Metastasis to liver (HCC)    Insomnia    Urinary incontinence    Vaginal discharge    History of radiation therapy    Metastatic adenocarcinoma to lung (HCC)    Chemotherapy-induced neuropathy (HCC)    Constipation due to pain medication    Rectovaginal fistula    Vesico-vaginal fistula    Cancer associated pain    Nephrostomy status (HCC)    Chronic kidney disease, stage III (moderate) ([address]    Mild protein-calorie malnutrition (HCC)    Anemia, iron deficiency    Right leg claudication (HCC)    Right iliac artery stenosis (HCC)    History of hypertension resolved with weight loss    Hypokalemia    Amputated toe of right foot (HCC) 4th toe due to ischemia    Closed fracture of neck of left femur ([address]    Closed displaced fracture of left femoral neck (HCC)    Tachycardia    Hip fracture, left, closed, with routine healing, subsequent encounter     Patient with Bone Marrow Suppression due to chemotherapy? Yes    Patient with history of GI bleeding? No  Patient with history of CHF? No  Patient with history of COPD? No    Hemoglobin/Hematocrit:    Lab Results   Component Value Date    HGB 9.0 10/03/2018    HCT 26.8 10/03/2018       Special Transfusion Products Requested: No  Blood was:  []  Irradiated    [] Washed    [] Other    Is the patient experiencing any:  Fatigue:   []   None  []   Increase over baseline but not altering normal activities  [x]   Moderate of causing difficulty performing some activities  []   Severe or loss of ability to perform some activities  []   Bedridden or disabling    Dizziness or Lightheadedness:   []   None  [x]   No Interference  []   Interferes with functioning but not activities of daily living  []   Interferes with daily activies  []   Bedridden or disabling    Shortness of Breath:   []   None   [x]   Dyspneic on exertion   []   Dyspnea with normal activities  []   Dyspnea at rest    Breath Sounds: No increased work of breathing, Breath sounds clear to auscultation bilaterally and Good air exchange    Edema: Yes in both legs all the way up to thighs up to 3+ in both legs. Wearing ricco hose.     Tachycardia: No    Heart Palpitations: No      Chest Pain: No    Premedicated:none ordered  [] Tylenol 325 mg oral  [] Tylenol 650 mg oral    [] Benadryl 25 mg oral   [] Benadryl 50 mg oral  [] Other    Administered Blood via: [] Peripheral access    [] PICC access    [] Port

## 2020-03-02 RX ORDER — NORTRIPTYLINE HYDROCHLORIDE 75 MG/1
CAPSULE ORAL
Qty: 90 CAPSULE | Refills: 1 | OUTPATIENT
Start: 2020-03-02

## 2022-10-05 NOTE — PROGRESS NOTES
Nephrostomy tubes draining and intact. Right pink and clear. Left clear. Gatito po snack and drink well. no

## (undated) DEVICE — KIT OR ROOM TURNOVER W/STRAP

## (undated) DEVICE — SYRINGE MED 30ML STD CLR PLAS LUERLOCK TIP N CTRL DISP

## (undated) DEVICE — PACK,LAPARASCOPY,PELVISCOPY,AURORA: Brand: MEDLINE

## (undated) DEVICE — SOLUTION PREP POVIDONE IOD FOR SKIN MUCOUS MEM PRIOR TO

## (undated) DEVICE — DRAPE,UNDERBUTTOCKS,PCH,STERILE: Brand: MEDLINE

## (undated) DEVICE — FEMORAL CANAL TIP, IRRIGATION/SUCTION

## (undated) DEVICE — Y-TYPE TUR/BLADDER IRRIGATION SET, REGULATING CLAMP

## (undated) DEVICE — TUBING, SUCTION, 1/4" X 12', STRAIGHT: Brand: MEDLINE

## (undated) DEVICE — GLOVE SURG SZ 65 THK91MIL LTX FREE SYN POLYISOPRENE

## (undated) DEVICE — Z DISCONTINUED USE 2275686 GLOVE SURG SZ 8 L12IN FNGR THK13MIL WHT ISOLEX POLYISOPRENE

## (undated) DEVICE — HANDPIECE SET WITH HIGH FLOW TIP AND SUCTION TUBE: Brand: INTERPULSE

## (undated) DEVICE — STERILE TOTAL HIP DRAPE PK: Brand: CARDINAL HEALTH

## (undated) DEVICE — PAD,NON-ADHERENT,3X8,STERILE,LF,1/PK: Brand: MEDLINE

## (undated) DEVICE — 3M™ STERI-DRAPE™ U-DRAPE 1015: Brand: STERI-DRAPE™

## (undated) DEVICE — SUTURE VCRL SZ 1 L18IN ABSRB UD L36MM CT-1 1/2 CIR J841D

## (undated) DEVICE — COVER,TABLE,44X90,STERILE: Brand: MEDLINE

## (undated) DEVICE — GLOVE SURG SZ 6 L12IN FNGR THK87MIL DK GRN LTX FREE ISOLEX

## (undated) DEVICE — PAD SANITARY MTRN TAB BELT WRP NS 11IN

## (undated) DEVICE — DRESSING FOAM W4XL10IN POLYUR SAFETAC MULTILAYERED ABSRB PD

## (undated) DEVICE — SUTURE TICRON SZ 2 L30IN NONABSORBABLE BLU HGS-21 1/2 CIR 8886311381

## (undated) DEVICE — SHEET,DRAPE,53X77,STERILE: Brand: MEDLINE

## (undated) DEVICE — TOTAL BASIC PK

## (undated) DEVICE — CANISTER, RIGID, 1200CC: Brand: MEDLINE INDUSTRIES, INC.

## (undated) DEVICE — STRIP,CLOSURE,WOUND,MEDI-STRIP,1/2X4: Brand: MEDLINE

## (undated) DEVICE — 4-PORT MANIFOLD: Brand: NEPTUNE 2

## (undated) DEVICE — SOLUTION IV IRRIG POUR BRL 0.9% SODIUM CHL 2F7124

## (undated) DEVICE — GLOVE SURG SZ 55 THK91MIL ORANGE  LTX FREE SYN POLYISOPRENE

## (undated) DEVICE — BASIC SINGLE BASIN 1-LF: Brand: MEDLINE INDUSTRIES, INC.

## (undated) DEVICE — HYPODERMIC SAFETY NEEDLE: Brand: MAGELLAN

## (undated) DEVICE — SUTURE VCRL SZ 0 L27IN ABSRB UD L36MM CT-1 1/2 CIR J260H

## (undated) DEVICE — ELECTRODE PT RET AD L9FT HI MOIST COND ADH HYDRGEL CORDED

## (undated) DEVICE — 3M™ IOBAN™ 2 ANTIMICROBIAL INCISE DRAPE 6650EZ: Brand: IOBAN™ 2

## (undated) DEVICE — COVER LT HNDL BLU PLAS

## (undated) DEVICE — SUTURE STRATAFIX SPRL SZ 2 0 L14IN ABSRB UD MH L36MM 1 2 CIR SXMD2B401

## (undated) DEVICE — GLOVE,SURG,SENSICARE SLT,LF,PF,6: Brand: MEDLINE

## (undated) DEVICE — DECANTER: Brand: UNBRANDED

## (undated) DEVICE — SOLUTION SCRB 4OZ 10% POVIDONE IOD ANTIMIC BTL

## (undated) DEVICE — MINOR SET UP PK

## (undated) DEVICE — Z DISCONTINUED BY MEDLINE USE 2711682 TRAY SKIN PREP DRY W/ PREM GLV

## (undated) DEVICE — SPONGE LAP W18XL18IN WHT COT 4 PLY FLD STRUNG RADPQ DISP ST

## (undated) DEVICE — STRAP RESTRN W3.5XL18IN STD ALL PURP DISP W/ SLNG RNG

## (undated) DEVICE — YANKAUER,BULB TIP,W/O VENT,RIGID,STERILE: Brand: MEDLINE

## (undated) DEVICE — Z INACTIVE USE 2660664 SOLUTION IRRIG 3000ML 0.9% SOD CHL USP UROMATIC PLAS CONT

## (undated) DEVICE — DRAPE,U/SHT,SPLIT,FILM,60X84,STERILE: Brand: MEDLINE

## (undated) DEVICE — SUTURE ABSORBABLE MONOFILAMENT 1-0 OS8 14 IN STRATAFIX SPRL SXPD2B202

## (undated) DEVICE — GLOVE SURG SZ 8 L12IN FNGR THK87MIL WHT LTX FREE

## (undated) DEVICE — GOWN SIRUS NONREIN XL W/TWL: Brand: MEDLINE INDUSTRIES, INC.

## (undated) DEVICE — SUTURE TICRN BR BL NDL C-20 SZ 5 30 8886302779

## (undated) DEVICE — UNDERPAD INCONT XL MESH PROTCT + DISP FOR MAT USE

## (undated) DEVICE — CATHETER,URETHRAL,REDRUBBER,STRL,16FR: Brand: MEDLINE

## (undated) DEVICE — CHLORAPREP 26ML ORANGE

## (undated) DEVICE — DUAL CUT SAGITTAL BLADE